# Patient Record
Sex: FEMALE | Race: WHITE | HISPANIC OR LATINO | Employment: OTHER | URBAN - METROPOLITAN AREA
[De-identification: names, ages, dates, MRNs, and addresses within clinical notes are randomized per-mention and may not be internally consistent; named-entity substitution may affect disease eponyms.]

---

## 2017-01-16 NOTE — TELEPHONE ENCOUNTER
Pt had family member call to request 3 month supply for voltaren gel.     Call back number: [place] [phone]

## 2017-03-10 ENCOUNTER — TELEPHONE (OUTPATIENT)
Dept: MEDICAL GROUP | Facility: MEDICAL CENTER | Age: 82
End: 2017-03-10

## 2017-03-10 NOTE — TELEPHONE ENCOUNTER
Patient's granddaughter called stating that patient has an appt for a colonoscopy on 3/20/17 and is asking if patient needs to worry about her blood sugars or have lab work done before the procedure?

## 2017-03-11 NOTE — TELEPHONE ENCOUNTER
I don't think so.  Her last CMP in nov showed glucose sl elevated at 109.  The colonoscopy place can always do a fingerstick glucose before the procedure if she is not feeling well.

## 2017-03-27 ENCOUNTER — OFFICE VISIT (OUTPATIENT)
Dept: MEDICAL GROUP | Facility: MEDICAL CENTER | Age: 82
End: 2017-03-27
Payer: MEDICARE

## 2017-03-27 VITALS
OXYGEN SATURATION: 94 % | WEIGHT: 147 LBS | HEART RATE: 70 BPM | SYSTOLIC BLOOD PRESSURE: 122 MMHG | TEMPERATURE: 98.5 F | BODY MASS INDEX: 28.86 KG/M2 | HEIGHT: 60 IN | DIASTOLIC BLOOD PRESSURE: 70 MMHG

## 2017-03-27 DIAGNOSIS — J40 BRONCHITIS: ICD-10-CM

## 2017-03-27 DIAGNOSIS — J02.9 ACUTE PHARYNGITIS, UNSPECIFIED ETIOLOGY: ICD-10-CM

## 2017-03-27 PROBLEM — J06.9 URI (UPPER RESPIRATORY INFECTION): Status: ACTIVE | Noted: 2017-03-27

## 2017-03-27 LAB
FLUAV+FLUBV AG SPEC QL IA: NORMAL
INT CON NEG: NEGATIVE
INT CON NEG: NEGATIVE
INT CON POS: POSITIVE
INT CON POS: POSITIVE
S PYO AG THROAT QL: NORMAL

## 2017-03-27 PROCEDURE — G8419 CALC BMI OUT NRM PARAM NOF/U: HCPCS | Performed by: FAMILY MEDICINE

## 2017-03-27 PROCEDURE — 87804 INFLUENZA ASSAY W/OPTIC: CPT | Performed by: FAMILY MEDICINE

## 2017-03-27 PROCEDURE — 87880 STREP A ASSAY W/OPTIC: CPT | Performed by: FAMILY MEDICINE

## 2017-03-27 PROCEDURE — G8484 FLU IMMUNIZE NO ADMIN: HCPCS | Performed by: FAMILY MEDICINE

## 2017-03-27 PROCEDURE — 1036F TOBACCO NON-USER: CPT | Performed by: FAMILY MEDICINE

## 2017-03-27 PROCEDURE — 4040F PNEUMOC VAC/ADMIN/RCVD: CPT | Performed by: FAMILY MEDICINE

## 2017-03-27 PROCEDURE — G8432 DEP SCR NOT DOC, RNG: HCPCS | Performed by: FAMILY MEDICINE

## 2017-03-27 PROCEDURE — 99213 OFFICE O/P EST LOW 20 MIN: CPT | Performed by: FAMILY MEDICINE

## 2017-03-27 PROCEDURE — 1101F PT FALLS ASSESS-DOCD LE1/YR: CPT | Performed by: FAMILY MEDICINE

## 2017-03-27 RX ORDER — CODEINE PHOSPHATE/GUAIFENESIN 10-100MG/5
5 LIQUID (ML) ORAL 3 TIMES DAILY PRN
Qty: 180 ML | Refills: 0 | Status: SHIPPED
Start: 2017-03-27 | End: 2017-04-06

## 2017-03-27 NOTE — PATIENT INSTRUCTIONS
Bronquitis  (Bronchitis)  La bronquitis es rell enfermedad de los conductos que transportan el aire a los pulmones. [place] dificulta la entrada y salida del aire. El estrechamiento de los conductos puede causar mucha tos. No recibir el tratamiento adecuado puede causar rell bronquitis de larga duración (crónica).  CUIDADOS EN EL HOGAR  · [place] mucha agua para mantener la orina [place] o de color [place] maddie.  · Use un humidificador de vapor frío.  · Si fuma, deje de fumar. Si sigue fumando, impedirá la curación de la bronquitis.  · Caliente los medicamentos según las indicaciones de gifofrd médico.  SOLICITE AYUDA DE INMEDIATO SI:  · La tos lo despierta por las noches.  · Respira con dificultad.  · Usted o gifford pollo se vuelve débil o más enfermo.  · Presenta dificultades para respirar, tiene sibilancias o le falta el aire.  · Tose y escupe jerardo.  · La tos dura más de 2 semanas.  · Tiene fiebre.  · Gifford bebé tiene más de 3 meses y gifford temperatura rectal es de 102° F (38.9° C) o más.  · Gifford bebé tiene 3 meses o menos y gifford temperatura rectal es de 100.4° F (38° C) o más.  ASEGURESE DE:  · Comprende estas instrucciones.  · Controlará gifford enfermedad.  · Solicitará ayuda de inmediato si no mejora o empeora.  Document Released: 03/16/2010 Document Revised: 03/11/2013  ExitCare® Patient Information ©2014 "[employer]".

## 2017-03-27 NOTE — ASSESSMENT & PLAN NOTE
Patient started Thursday 3/23 with fever and chills. She then developed a sore throat and cough. She does have some body aches. She did get flu shot in the fall. She lives with her daughter and son-in-law and her son-in-law has been ill with influenza. She denies any shortness of breath. She is hoarse and the cough does keep her up at night. She denies any headache.

## 2017-03-27 NOTE — MR AVS SNAPSHOT
Marii Green Perez   3/27/2017 3:00 PM   Office Visit   MRN: [id number]    Department:  [facility]   Dept Phone:  [phone]    Description:  Female : 1935   Provider:  Kitty Lantigua M.D.           Reason for Visit     Cough 3 days    Headache           Allergies as of 3/27/2017     Allergen Noted Reactions    Etodolac 2009   Anaphylaxis    dizziness    Lactose 2009   Diarrhea    intolerant      You were diagnosed with     Acute pharyngitis, unspecified etiology   [[id number]]       Bronchitis   [[id number]]         Vital Signs     Blood Pressure Pulse Temperature Height Weight Body Mass Index    122/70 mmHg 70 36.9 °C (98.5 °F) 1.524 m (5') 66.679 kg (147 lb) 28.71 kg/m2    Oxygen Saturation Breastfeeding? Smoking Status             94% No Never Smoker          Basic Information     Date Of Birth Sex Race Ethnicity Preferred Language    1935 Female White  Origin (Bolivian,Kyrgyz,Danish,Akil, etc) English      Problem List              ICD-10-CM Priority Class Noted - Resolved    Osteoporosis, senile M81.0 Low  12/3/2009 - Present    Preventative health care Z00.00   4/15/2010 - Present    Diverticulitis K57.92   Unknown - Present    Metabolic syndrome E88.81   Unknown - Present    Fatty liver K76.0   Unknown - Present    DDD (degenerative disc disease) [id number] Low  Unknown - Present    Allergic rhinitis    2011 - Present    Hyperlipidemia E78.5 Low  Unknown - Present    Lumbar stenosis M48.06 Low  Unknown - Present    History of pancreatitis Z87.19   Unknown - Present    RLQ abdominal pain R10.31   2012 - Present    Vitamin d deficiency    Unknown - Present    Abnormal LFTs R79.89   2014 - Present    Flatulence, eructation, and gas pain R14.3, R14.1, R14.2   2014 - Present    Fatigue R53.83   2015 - Present    Chest pain R07.9   2015 - Present    Shingles outbreak B02.9   9/3/2015 - Present    Peripheral neuropathy (CMS-HCC) G62.9    9/3/2015 - Present    URI (upper respiratory infection) J06.9   3/27/2017 - Present      Health Maintenance        Date Due Completion Dates    IMM ZOSTER VACCINE 6/1/1996 ---    IMM DTaP/Tdap/Td Vaccine (1 - Tdap) 2/19/2014 2/18/2014    IMM PNEUMOCOCCAL 65+ (ADULT) LOW/MEDIUM RISK SERIES (2 of 2 - PCV13) 7/16/2016 7/16/2015    IMM INFLUENZA (1) 9/1/2016 10/21/2015    MAMMOGRAM 6/22/2017 6/22/2016, 4/1/2015, 3/16/2015, 3/14/2014, 5/14/2012, 11/28/2011, 5/25/2011, 5/13/2011, 5/10/2010, 5/10/2010, 3/31/2009, 3/31/2009, 3/27/2008, 3/27/2008, 12/14/2006, 11/3/2005, 6/22/2004    BONE DENSITY 7/10/2019 7/10/2014, 7/9/2012, 11/25/2009    COLONOSCOPY 11/22/2020 11/22/2010 (Done)    Override on 11/22/2010: Done ([facility]IC)            Results     POCT Influenza A/B      Component    Rapid Influenza A-B    NEG    Internal Control Positive    Positive    Internal Control Negative    Negative                POCT Rapid Strep A      Component    Rapid Strep Screen    NEG    Internal Control Positive    Positive    Internal Control Negative    Negative                        Current Immunizations     Influenza Vaccine Adult HD 10/21/2015  3:53 PM    Pneumococcal polysaccharide vaccine (PPSV-23) 7/16/2015    TD Vaccine 2/18/2014      Below and/or attached are the medications your provider expects you to take. Review all of your home medications and newly ordered medications with your provider and/or pharmacist. Follow medication instructions as directed by your provider and/or pharmacist. Please keep your medication list with you and share with your provider. Update the information when medications are discontinued, doses are changed, or new medications (including over-the-counter products) are added; and carry medication information at all times in the event of emergency situations     Allergies:  ETODOLAC - Anaphylaxis     LACTOSE - Diarrhea               Medications  Valid as of: March 27, 2017 -  3:40 PM    Generic Name Brand Name  Tablet Size Instructions for use    Alendronate Sodium (Tab) FOSAMAX 70 MG Take 70 mg by mouth every 7 days. On Sun        Atorvastatin Calcium (Tab) LIPITOR 40 MG Take 40 mg by mouth every day.        Atorvastatin Calcium (Tab) LIPITOR 40 MG TAKE ONE TABLET BY MOUTH ONCE DAILY        Calcium   Take 1 Tab by mouth every day.        Cholecalciferol   Take 1 Tab by mouth every day.        Ciprofloxacin HCl (Tab) CIPRO 500 MG Take 500 mg by mouth 2 times a day. Pt started on 10/12/2016 for 10 day course for constipation, unspecified constipation type, Diarrhea of infectious origin, and lower abdominal pain of unknown etiology.        Diclofenac Sodium (Tablet Delayed Response) VOLTAREN 75 MG Take 1 Tab by mouth 2 times a day.        Diclofenac Sodium (Gel) Diclofenac Sodium 1 % Apply 1 Application to skin as directed 2 times a day. For pain in legs and hands.        Donepezil HCl (Tab) ARICEPT 5 MG Take 1 Tab by mouth every evening.        DULoxetine HCl (Cap DR Particles) CYMBALTA 60 MG Take 1 Cap by mouth every day.        Glucos-Chondroit-Hyaluron-MSM   Take 1 Tab by mouth every day.        Guaifenesin-Codeine (Syrup) TUSSI-ORGANIDIN -10 MG/5ML Take 5 mL by mouth 3 times a day as needed for Cough.        Hydrocodone-Acetaminophen (Tab) NORCO 5-325 MG Take 1 Tab by mouth every 8 hours as needed.        Levothyroxine Sodium (Tab) SYNTHROID 25 MCG Take 1 Tab by mouth Every morning on an empty stomach.        Montelukast Sodium (Tab) SINGULAIR 10 MG Take 1 Tab by mouth every day.        Olopatadine HCl (Solution) PATADAY 0.2 % INSTILL ONE DROP ONCE DAILY INTO EACH EYE        Omega-3 Fatty Acids (Cap) fish oil 1000 MG Take 2,000 mg by mouth every day.        Omeprazole (CAPSULE DELAYED RELEASE) PRILOSEC 40 MG Take 40 mg by mouth every day.        Oxybutynin Chloride (TABLET SR 24 HR) DITROPAN-XL 10 MG Take 10 mg by mouth every evening.        Polyethylene Glycol 3350 (Powder) MIRALAX  Take 17 g by mouth every day.          Pregabalin (Cap) LYRICA 100 MG Take 100 mg by mouth 2 times a day. Pt takes:  100MG in AM  200MG PM        Sucralfate (Tab) CARAFATE 1 GM Take 1 Tab by mouth 4 Times a Day,Before Meals and at Bedtime.        .                 Medicines prescribed today were sent to:     [facility] [address] [place] [address], [place] - [address]    [address] [place] [address]    Phone: [phone] Fax: [phone]    Open 24 Hours?: No      Medication refill instructions:       If your prescription bottle indicates you have medication refills left, it is not necessary to call your provider’s office. Please contact your pharmacy and they will refill your medication.    If your prescription bottle indicates you do not have any refills left, you may request refills at any time through one of the following ways: The online [email or web address] system (except Urgent Care), by calling your provider’s office, or by asking your pharmacy to contact your provider’s office with a refill request. Medication refills are processed only during regular business hours and may not be available until the next business day. Your provider may request additional information or to have a follow-up visit with you prior to refilling your medication.   *Please Note: Medication refills are assigned a new Rx number when refilled electronically. Your pharmacy may indicate that no refills were authorized even though a new prescription for the same medication is available at the pharmacy. Please request the medicine by name with the pharmacy before contacting your provider for a refill.        Instructions    Bronquitis  (Bronchitis)  La bronquitis es rell enfermedad de los conductos que transportan el aire a los pulmones. [place] dificulta la entrada y salida del aire. El estrechamiento de los conductos puede causar mucha tos. No recibir el tratamiento adecuado puede causar rell bronquitis de larga duración (crónica).  CUIDADOS EN EL HOGAR  · [place] mucha agua para mantener la orina  indiana o de color amarillo maddie.  · Use un humidificador de vapor frío.  · Si fuma, deje de fumar. Si sigue fumando, impedirá la curación de la bronquitis.  · [place] los medicamentos según las indicaciones de gifford médico.  SOLICITE AYUDA DE INMEDIATO SI:  · La tos lo despierta por las noches.  · Respira con dificultad.  · Usted o gifford pollo se vuelve débil o más enfermo.  · Presenta dificultades para respirar, tiene sibilancias o le falta el aire.  · Tose y escupe jerardo.  · La tos dura más de 2 semanas.  · Tiene fiebre.  · Gifford bebé tiene más de 3 meses y gifford temperatura rectal es de 102° F (38.9° C) o más.  · Gifford bebé tiene 3 meses o menos y gifford temperatura rectal es de 100.4° F (38° C) o más.  ASEGURESE DE:  · Comprende estas instrucciones.  · Controlará gifford enfermedad.  · Solicitará ayuda de inmediato si no mejora o empeora.  Document Released: 03/16/2010 Document Revised: 03/11/2013  ExitCare® Patient Information ©2014 [employer].         Other Notes About Your Plan     Enrolled in R-Med Team with Neha Jacques Access Code: [email or web address]  Expires: 4/26/2017  3:40 PM    Acteavo  A secure, online tool to manage your health information     [employer]’s Acteavo® is a secure, online tool that connects you to your personalized health information from the privacy of your home -- day or night - making it very easy for you to manage your healthcare. Once the activation process is completed, you can even access your medical information using the Acteavo sadia, which is available for free in the Apple Sadia store or Google Play store.     Acteavo provides the following levels of access (as shown below):   My Chart Features   [facility] Doctor [facility]  Specialists [facility]  Urgent  Care Non-[facility]  Primary Care  Doctor   Email your healthcare team securely and privately 24/7 X X X    Manage appointments: schedule your next appointment; view details of past/upcoming appointments X      Request prescription  refills. X      View recent personal medical records, including lab and immunizations X X X X   View health record, including health history, allergies, medications X X X X   Read reports about your outpatient visits, procedures, consult and ER notes X X X X   See your discharge summary, which is a recap of your hospital and/or ER visit that includes your diagnosis, lab results, and care plan. X X       How to register for [employer]:  1. Go to  [email or web address].  2. Click on the Sign Up Now box, which takes you to the New Member Sign Up page. You will need to provide the following information:  a. Enter your [employer] Access Code exactly as it appears at the top of this page. (You will not need to use this code after you’ve completed the sign-up process. If you do not sign up before the expiration date, you must request a new code.)   b. Enter your date of birth.   c. Enter your home email address.   d. Click Submit, and follow the next screen’s instructions.  3. Create a [employer] ID. This will be your [employer] login ID and cannot be changed, so think of one that is secure and easy to remember.  4. Create a [employer] password. You can change your password at any time.  5. Enter your Password Reset Question and Answer. This can be used at a later time if you forget your password.   6. Enter your e-mail address. This allows you to receive e-mail notifications when new information is available in [employer].  7. Click Sign Up. You can now view your health information.    For assistance activating your [employer] account, call [phone]

## 2017-03-27 NOTE — PROGRESS NOTES
Subjective:     Chief Complaint   Patient presents with   • Cough     3 days   • Headache     Marii is a regular patient of Neha Ball.  Marii Neal is a 81 y.o. female here today for evaluation and management of:    URI (upper respiratory infection)  Patient started Thursday 3/23 with fever and chills. She then developed a sore throat and cough. She does have some body aches. She did get flu shot in the fall. She lives with her daughter and son-in-law and her son-in-law has been ill with influenza. She denies any shortness of breath. She is hoarse and the cough does keep her up at night. She denies any headache.       Allergies   Allergen Reactions   • Etodolac Anaphylaxis     dizziness   • Lactose Diarrhea     intolerant       Current medicines (including changes today)  Current Outpatient Prescriptions   Medication Sig Dispense Refill   • guaifenesin-codeine (TUSSI-ORGANIDIN NR) 100-10 MG/5ML syrup Take 5 mL by mouth 3 times a day as needed for Cough. 180 mL 0   • PATADAY 0.2 % Solution INSTILL ONE DROP ONCE DAILY INTO EACH EYE 3 Bottle 0   • sucralfate (CARAFATE) 1 GM Tab Take 1 Tab by mouth 4 Times a Day,Before Meals and at Bedtime. 120 Tab 0   • duloxetine (CYMBALTA) 60 MG Cap DR Particles delayed-release capsule Take 1 Cap by mouth every day. 90 Cap 0   • polyethylene glycol 3350 (MIRALAX) Powder Take 17 g by mouth every day. 1581 g 0   • atorvastatin (LIPITOR) 40 MG Tab TAKE ONE TABLET BY MOUTH ONCE DAILY 90 Tab 3   • diclofenac EC (VOLTAREN) 75 MG Tablet Delayed Response Take 1 Tab by mouth 2 times a day. 30 Tab 0   • alendronate (FOSAMAX) 70 MG Tab Take 70 mg by mouth every 7 days. On Sun     • atorvastatin (LIPITOR) 40 MG Tab Take 40 mg by mouth every day.     • ciprofloxacin (CIPRO) 500 MG Tab Take 500 mg by mouth 2 times a day. Pt started on 10/12/2016 for 10 day course for constipation, unspecified constipation type, Diarrhea of infectious origin, and lower abdominal pain of  unknown etiology.     • omeprazole (PRILOSEC) 40 MG delayed-release capsule Take 40 mg by mouth every day.     • oxybutynin SR (DITROPAN-XL) 10 MG CR tablet Take 10 mg by mouth every evening.     • Cholecalciferol (VITAMIN D PO) Take 1 Tab by mouth every day.     • Omega-3 Fatty Acids (FISH OIL) 1000 MG Cap capsule Take 2,000 mg by mouth every day.     • CALCIUM PO Take 1 Tab by mouth every day.     • Glucos-Chondroit-Hyaluron-MSM (GLUCOSAMINE CHONDROITIN JOINT PO) Take 1 Tab by mouth every day.     • levothyroxine (SYNTHROID) 25 MCG Tab Take 1 Tab by mouth Every morning on an empty stomach. 90 Tab 3   • donepezil (ARICEPT) 5 MG Tab Take 1 Tab by mouth every evening. 90 Tab 3   • pregabalin (LYRICA) 100 MG Cap Take 100 mg by mouth 2 times a day. Pt takes:  100MG in AM  200MG PM     • montelukast (SINGULAIR) 10 MG Tab Take 1 Tab by mouth every day. 30 Tab 30   • Diclofenac Sodium (VOLTAREN) 1 % Gel Apply 1 Application to skin as directed 2 times a day. For pain in legs and hands. 1 Tube 3   • hydrocodone-acetaminophen (NORCO) 5-325 MG Tab per tablet Take 1 Tab by mouth every 8 hours as needed. 30 Tab 0     No current facility-administered medications for this visit.       She  has a past medical history of Arthritis; Diverticulitis; Pancreatitis (2002); Lumbar stenosis; Hyperlipidemia LDL goal < 100; Osteoporosis; ASCUS on Pap smear (5/8/09); H. pylori infection (3/17/08); Metabolic syndrome; Fatty liver; Normal cardiac stress test (1/8/10); DDD (degenerative disc disease); Allergic rhinitis; Vitamin d deficiency; and Peripheral neuropathy (CMS-HCC). She also has no past medical history of Breast cancer (CMS-HCC).    Patient Active Problem List    Diagnosis Date Noted   • Hyperlipidemia      Priority: Low   • Lumbar stenosis      Priority: Low   • DDD (degenerative disc disease)      Priority: Low   • Osteoporosis, senile 12/03/2009     Priority: Low   • URI (upper respiratory infection) 03/27/2017   • Shingles  outbreak 09/03/2015   • Peripheral neuropathy (CMS-[facility]) 09/03/2015   • Chest pain 05/29/2015   • Fatigue 02/24/2015   • Flatulence, eructation, and gas pain 01/29/2014   • Abnormal LFTs 01/08/2014   • Vitamin d deficiency    • RLQ abdominal pain 06/07/2012   • History of pancreatitis    • Allergic rhinitis 04/14/2011   • Metabolic syndrome    • Fatty liver    • Diverticulitis    • Preventative health care 04/15/2010       ROS    No nausea or vomiting.  No chest pain or palpitations.  No  SOB.  No pain with urination or hematuria.  No black or bloody stools.       Objective:     Blood pressure 122/70, pulse 70, temperature 36.9 °C (98.5 °F), height 1.524 m (5'), weight 66.679 kg (147 lb), SpO2 94 %, not currently breastfeeding. Body mass index is 28.71 kg/(m^2).   Physical Exam:  Well developed, well nourished.  Alert, oriented in no acute distress.  Eye contact is good, speech goal directed, affect calm  Eyes: conjunctiva non-injected, sclera non-icteric.  Ears: Pinna normal. TM pearly gray.   Nose: Nares are patent.  Erythematous swollen mucosa with clear discharge  Mouth: Oral mucous membranes pink and moist with no lesions. Posterior pharynx with moderate diffuse erythema without exudates  Neck Supple.  No adenopathy or masses in the neck or supraclavicular regions. No thyromegaly  Lungs: clear to auscultation bilaterally with good excursion. No wheezes or rhonchi  CV: regular rate and rhythm. No murmur      Assessment and Plan:   The following treatment plan was discussed    1. Acute pharyngitis, unspecified etiology  Viral symptomatic care. Call if not improving  - POCT Influenza A/B  - POCT Rapid Strep A    2. Bronchitis  Cough syrup as needed. Increase fluids and rest. Call if not improving. Patient education material given.  - guaifenesin-codeine (TUSSI-ORGANIDIN NR) 100-10 MG/5ML syrup; Take 5 mL by mouth 3 times a day as needed for Cough.  Dispense: 180 mL; Refill: 0    Any change or worsening of signs or  symptoms, patient encouraged to follow-up or report to the emergency room for further evaluation. Patient understands and agrees.    Followup: Return if symptoms worsen or fail to improve.

## 2017-04-06 ENCOUNTER — OFFICE VISIT (OUTPATIENT)
Dept: MEDICAL GROUP | Facility: MEDICAL CENTER | Age: 82
End: 2017-04-06
Payer: MEDICARE

## 2017-04-06 VITALS
DIASTOLIC BLOOD PRESSURE: 62 MMHG | WEIGHT: 148 LBS | HEART RATE: 125 BPM | BODY MASS INDEX: 29.06 KG/M2 | SYSTOLIC BLOOD PRESSURE: 110 MMHG | OXYGEN SATURATION: 93 % | TEMPERATURE: 98.4 F | HEIGHT: 60 IN

## 2017-04-06 DIAGNOSIS — R41.3 MEMORY LOSS: ICD-10-CM

## 2017-04-06 DIAGNOSIS — R51.9 RECURRENT HEADACHE: ICD-10-CM

## 2017-04-06 DIAGNOSIS — R45.4 IRRITABILITY: ICD-10-CM

## 2017-04-06 PROCEDURE — G8419 CALC BMI OUT NRM PARAM NOF/U: HCPCS | Performed by: NURSE PRACTITIONER

## 2017-04-06 PROCEDURE — 1036F TOBACCO NON-USER: CPT | Performed by: NURSE PRACTITIONER

## 2017-04-06 PROCEDURE — 4040F PNEUMOC VAC/ADMIN/RCVD: CPT | Performed by: NURSE PRACTITIONER

## 2017-04-06 PROCEDURE — 99214 OFFICE O/P EST MOD 30 MIN: CPT | Performed by: NURSE PRACTITIONER

## 2017-04-06 PROCEDURE — G8432 DEP SCR NOT DOC, RNG: HCPCS | Performed by: NURSE PRACTITIONER

## 2017-04-06 PROCEDURE — 1101F PT FALLS ASSESS-DOCD LE1/YR: CPT | Performed by: NURSE PRACTITIONER

## 2017-04-06 NOTE — MR AVS SNAPSHOT
Marii Jolly Peter Kirkpatrick   2017 11:00 AM   Office Visit   MRN: [id number]    Department:  [facility]   Dept Phone:  [phone]    Description:  Female : 1935   Provider:  MUKUL Raymond           Allergies as of 2017     Allergen Noted Reactions    Etodolac 2009   Anaphylaxis    dizziness    Lactose 2009   Diarrhea    intolerant      You were diagnosed with     Memory loss   [780.93.ICD-9-CM]   refer neuro for eval.  MRI brain w/o.  dc oxybutynin and omeprazole    Irritability   [799.22.ICD-9-CM]       Recurrent headache   [736439]         Vital Signs     Blood Pressure Pulse Temperature Height Weight Body Mass Index    110/62 mmHg 125 36.9 °C (98.4 °F) 1.524 m (5') 67.132 kg (148 lb) 28.90 kg/m2    Oxygen Saturation Breastfeeding? Smoking Status             93% No Never Smoker          Basic Information     Date Of Birth Sex Race Ethnicity Preferred Language    1935 Female White  Origin (Urdu,Togolese,Senegalese,Maltese, etc) English      Problem List              ICD-10-CM Priority Class Noted - Resolved    Osteoporosis, senile M81.0 Low  12/3/2009 - Present    Preventative health care Z00.00   4/15/2010 - Present    Diverticulitis K57.92   Unknown - Present    Metabolic syndrome E88.81   Unknown - Present    Fatty liver K76.0   Unknown - Present    DDD (degenerative disc disease) [id number] Low  Unknown - Present    Allergic rhinitis    2011 - Present    Hyperlipidemia E78.5 Low  Unknown - Present    Lumbar stenosis M48.06 Low  Unknown - Present    History of pancreatitis Z87.19   Unknown - Present    RLQ abdominal pain R10.31   2012 - Present    Vitamin d deficiency    Unknown - Present    Abnormal LFTs R79.89   2014 - Present    Flatulence, eructation, and gas pain R14.3, R14.1, R14.2   2014 - Present    Fatigue R53.83   2015 - Present    Chest pain R07.9   2015 - Present    Shingles outbreak B02.9   9/3/2015 - Present      Peripheral neuropathy (CMS-[facility]) G62.9   9/3/2015 - Present    URI (upper respiratory infection) J06.9   3/27/2017 - Present      Health Maintenance        Date Due Completion Dates    IMM ZOSTER VACCINE 6/1/1996 ---    IMM DTaP/Tdap/Td Vaccine (1 - Tdap) 2/19/2014 2/18/2014    IMM PNEUMOCOCCAL 65+ (ADULT) LOW/MEDIUM RISK SERIES (2 of 2 - PCV13) 7/16/2016 7/16/2015    MAMMOGRAM 6/22/2017 6/22/2016, 4/1/2015, 3/16/2015, 3/14/2014, 5/14/2012, 11/28/2011, 5/25/2011, 5/13/2011, 5/10/2010, 5/10/2010, 3/31/2009, 3/31/2009, 3/27/2008, 3/27/2008, 12/14/2006, 11/3/2005, 6/22/2004    BONE DENSITY 7/10/2019 7/10/2014, 7/9/2012, 11/25/2009    COLONOSCOPY 11/22/2020 11/22/2010 (Done)    Override on 11/22/2010: Done ([facility])            Current Immunizations     Influenza Vaccine Adult HD 10/21/2015  3:53 PM    Pneumococcal polysaccharide vaccine (PPSV-23) 7/16/2015    TD Vaccine 2/18/2014      Below and/or attached are the medications your provider expects you to take. Review all of your home medications and newly ordered medications with your provider and/or pharmacist. Follow medication instructions as directed by your provider and/or pharmacist. Please keep your medication list with you and share with your provider. Update the information when medications are discontinued, doses are changed, or new medications (including over-the-counter products) are added; and carry medication information at all times in the event of emergency situations     Allergies:  ETODOLAC - Anaphylaxis     LACTOSE - Diarrhea               Medications  Valid as of: April 06, 2017 - 11:28 AM    Generic Name Brand Name Tablet Size Instructions for use    Alendronate Sodium (Tab) FOSAMAX 70 MG Take 70 mg by mouth every 7 days. On Sun        Atorvastatin Calcium (Tab) LIPITOR 40 MG Take 40 mg by mouth every day.        Calcium   Take 1 Tab by mouth every day.        Cholecalciferol   Take 1 Tab by mouth every day.        Diclofenac Sodium (Gel) Diclofenac  Sodium 1 % Apply 1 Application to skin as directed 2 times a day. For pain in legs and hands.        Donepezil HCl (Tab) ARICEPT 5 MG Take 1 Tab by mouth every evening.        DULoxetine HCl (Cap DR Particles) CYMBALTA 60 MG Take 1 Cap by mouth every day.        Glucos-Chondroit-Hyaluron-MSM   Take 1 Tab by mouth every day.        Levothyroxine Sodium (Tab) SYNTHROID 25 MCG Take 1 Tab by mouth Every morning on an empty stomach.        Montelukast Sodium (Tab) SINGULAIR 10 MG Take 1 Tab by mouth every day.        Olopatadine HCl (Solution) PATADAY 0.2 % INSTILL ONE DROP ONCE DAILY INTO EACH EYE        Omega-3 Fatty Acids (Cap) fish oil 1000 MG Take 2,000 mg by mouth every day.        Polyethylene Glycol 3350 (Powder) MIRALAX  Take 17 g by mouth every day.        Pregabalin (Cap) LYRICA 100 MG Take 100 mg by mouth 2 times a day. Pt takes:  100MG in AM  200MG PM        .                 Medicines prescribed today were sent to:     [facility] [address] [place], [place] [address] [address] [place], [place] [address] [address] [place] [id number]    Phone: [phone] Fax: [phone]    Open 24 Hours?: No      Medication refill instructions:       If your prescription bottle indicates you have medication refills left, it is not necessary to call your provider’s office. Please contact your pharmacy and they will refill your medication.    If your prescription bottle indicates you do not have any refills left, you may request refills at any time through one of the following ways: The online [employer] system (except Urgent Care), by calling your provider’s office, or by asking your pharmacy to contact your provider’s office with a refill request. Medication refills are processed only during regular business hours and may not be available until the next business day. Your provider may request additional information or to have a follow-up visit with you prior to refilling your medication.   *Please Note: Medication refills are assigned a new Rx number when  refilled electronically. Your pharmacy may indicate that no refills were authorized even though a new prescription for the same medication is available at the pharmacy. Please request the medicine by name with the pharmacy before contacting your provider for a refill.        Your To Do List     Future Labs/Procedures Complete By Expires    MR-BRAIN-W/O  As directed 10/7/2017    Scheduling Instructions:    Memory loss      Referral     A referral request has been sent to our patient care coordination department. Please allow 3-5 business days for us to process this request and contact you either by phone or mail. If you do not hear from us by the 5th business day, please call us at [phone].        Other Notes About Your Plan     Enrolled in R-Med Team with Neha Jacques Status: Patient Declined

## 2017-04-06 NOTE — PROGRESS NOTES
Subjective:      Marii Neal is a 81 y.o. female who presents with No chief complaint on file.            HPI  Seen in f/u for memory loss.  Its getting worse.  She is angry all the time.  That is getting worse.  She is on omeprazoe and oxybutynin that can effect memory.  They would like to see neuro.   Daughter is her primary caregiver and its getting much harder to care for pt d/t the anger.    She has freq headache.    b12 and folate done and were wnl.  seh is on aricept w/o improvment.   Taking meds approp.  They are given to her by her family.    Having some allergy sx.  She is on singulair, zyrtec and flonase.  Did MMSE on pt via daughter as .  She could not do most of requests.        Patient Active Problem List    Diagnosis Date Noted   • Hyperlipidemia      Priority: Low   • Lumbar stenosis      Priority: Low   • DDD (degenerative disc disease)      Priority: Low   • Osteoporosis, senile 12/03/2009     Priority: Low   • URI (upper respiratory infection) 03/27/2017   • Shingles outbreak 09/03/2015   • Peripheral neuropathy (CMS-HCC) 09/03/2015   • Chest pain 05/29/2015   • Fatigue 02/24/2015   • Flatulence, eructation, and gas pain 01/29/2014   • Abnormal LFTs 01/08/2014   • Vitamin d deficiency    • RLQ abdominal pain 06/07/2012   • History of pancreatitis    • Allergic rhinitis 04/14/2011   • Metabolic syndrome    • Fatty liver    • Diverticulitis    • Preventative health care 04/15/2010     Current Outpatient Prescriptions   Medication Sig Dispense Refill   • Diclofenac Sodium (VOLTAREN) 1 % Gel Apply 1 Application to skin as directed 2 times a day. For pain in legs and hands. 1 Tube 3   • PATADAY 0.2 % Solution INSTILL ONE DROP ONCE DAILY INTO EACH EYE 3 Bottle 0   • duloxetine (CYMBALTA) 60 MG Cap DR Particles delayed-release capsule Take 1 Cap by mouth every day. 90 Cap 0   • polyethylene glycol 3350 (MIRALAX) Powder Take 17 g by mouth every day. 1581 g 0   • alendronate  (FOSAMAX) 70 MG Tab Take 70 mg by mouth every 7 days. On Sun     • atorvastatin (LIPITOR) 40 MG Tab Take 40 mg by mouth every day.     • Cholecalciferol (VITAMIN D PO) Take 1 Tab by mouth every day.     • Omega-3 Fatty Acids (FISH OIL) 1000 MG Cap capsule Take 2,000 mg by mouth every day.     • CALCIUM PO Take 1 Tab by mouth every day.     • Glucos-Chondroit-Hyaluron-MSM (GLUCOSAMINE CHONDROITIN JOINT PO) Take 1 Tab by mouth every day.     • levothyroxine (SYNTHROID) 25 MCG Tab Take 1 Tab by mouth Every morning on an empty stomach. 90 Tab 3   • donepezil (ARICEPT) 5 MG Tab Take 1 Tab by mouth every evening. 90 Tab 3   • pregabalin (LYRICA) 100 MG Cap Take 100 mg by mouth 2 times a day. Pt takes:  100MG in AM  200MG PM     • montelukast (SINGULAIR) 10 MG Tab Take 1 Tab by mouth every day. 30 Tab 30     No current facility-administered medications for this visit.     Allergies   Allergen Reactions   • Etodolac Anaphylaxis     dizziness   • Lactose Diarrhea     intolerant       ROS  Review of Systems   Constitutional: Negative.  Negative for fever, chills, weight loss, malaise/fatigue and diaphoresis.   HENT: Negative.  Negative for hearing loss, ear pain, nosebleeds, congestion, sore throat, neck pain, tinnitus and ear discharge.    Respiratory: Negative.  Negative for cough, hemoptysis, sputum production, shortness of breath, wheezing and stridor.    Cardiovascular: Negative.  Negative for chest pain, palpitations, orthopnea, claudication, leg swelling and PND.   Gastrointestinal: denies nausea, vomiting, diarrhea, constipation, heartburn, melena or hematochezia.  Genitourinary: Denies dysuria, hematuria, urinary incontinence, frequency or urgency.    Musculoskeletal: Negative.  Negative for myalgias and back pain.   Neurological: Negative.  Negative for dizziness, tingling, tremors, weakness and headaches.   Psych:  Denies depression, anxiety or insomnia.  All other systems reviewed and are negative.            Objective:     /62 mmHg  Pulse 125  Temp(Src) 36.9 °C (98.4 °F)  Ht 1.524 m (5')  Wt 67.132 kg (148 lb)  BMI 28.90 kg/m2  SpO2 93%  Breastfeeding? No     Physical Exam   Eyes: EOM are normal. Pupils are equal, round, and reactive to light. Right eye exhibits no discharge. Left eye exhibits no discharge. No scleral icterus.   Neurological: She is alert. No cranial nerve deficit. Coordination abnormal.         Physical Exam   Vitals reviewed.  Constitutional: appears well-developed and well-nourished. No distress.   Cardiovascular: Normal rate, regular rhythm, normal heart sounds and intact distal pulses.  Exam reveals no gallop and no friction rub.  No murmur heard.  No carotid bruits.  Obeys simple commands.   Pulmonary/Chest: Effort normal and breath sounds normal. No stridor. No respiratory distress. no wheezes or rales. exhibits no tenderness.   Musculoskeletal: Normal range of motion. exhibits no edema. bryn pedal pulses 2+.  Neurological: disoriented to person, place and time.   Skin: Skin is warm and dry. no diaphoresis.   Psychiatric: normal mood and affect. behavior is normal.   amb slowly with walker.    MMSE done via .  11.5/30         Assessment/Plan:     1. Memory loss  REFERRAL TO NEUROLOGY    MR-BRAIN-W/O    refer neuro for eval.  MRI brain w/o.  dc oxybutynin and omeprazole   2. Irritability  REFERRAL TO NEUROLOGY    MR-BRAIN-W/O   3. Recurrent headache  REFERRAL TO NEUROLOGY    MR-BRAIN-W/O     4.  F/u after neuro eval.

## 2017-04-12 NOTE — TELEPHONE ENCOUNTER
Was the patient seen in the last year in this department? Yes     Does patient have an active prescription for medications requested? No     Received Request Via: Pharmacy

## 2017-04-18 ENCOUNTER — OFFICE VISIT (OUTPATIENT)
Dept: URGENT CARE | Facility: PHYSICIAN GROUP | Age: 82
End: 2017-04-18
Payer: MEDICARE

## 2017-04-18 ENCOUNTER — HOSPITAL ENCOUNTER (OUTPATIENT)
Dept: LAB | Facility: MEDICAL CENTER | Age: 82
End: 2017-04-18
Attending: PHYSICIAN ASSISTANT
Payer: MEDICARE

## 2017-04-18 ENCOUNTER — TELEPHONE (OUTPATIENT)
Dept: URGENT CARE | Facility: PHYSICIAN GROUP | Age: 82
End: 2017-04-18

## 2017-04-18 VITALS
HEART RATE: 86 BPM | TEMPERATURE: 97.9 F | RESPIRATION RATE: 16 BRPM | HEIGHT: 60 IN | OXYGEN SATURATION: 98 % | DIASTOLIC BLOOD PRESSURE: 64 MMHG | WEIGHT: 148 LBS | SYSTOLIC BLOOD PRESSURE: 112 MMHG | BODY MASS INDEX: 29.06 KG/M2

## 2017-04-18 DIAGNOSIS — K57.32 DIVERTICULITIS OF LARGE INTESTINE WITHOUT PERFORATION OR ABSCESS WITHOUT BLEEDING: Primary | ICD-10-CM

## 2017-04-18 DIAGNOSIS — M25.552 LEFT HIP PAIN: ICD-10-CM

## 2017-04-18 DIAGNOSIS — K57.32 DIVERTICULITIS OF COLON: ICD-10-CM

## 2017-04-18 DIAGNOSIS — R10.32 LEFT LOWER QUADRANT PAIN: ICD-10-CM

## 2017-04-18 DIAGNOSIS — K57.92 DIVERTICULITIS OF INTESTINE WITHOUT PERFORATION OR ABSCESS WITHOUT BLEEDING, UNSPECIFIED PART OF INTESTINAL TRACT: ICD-10-CM

## 2017-04-18 LAB
ALBUMIN SERPL BCP-MCNC: 4 G/DL (ref 3.2–4.9)
ALBUMIN/GLOB SERPL: 1.3 G/DL
ALP SERPL-CCNC: 75 U/L (ref 30–99)
ALT SERPL-CCNC: 15 U/L (ref 2–50)
ANION GAP SERPL CALC-SCNC: 8 MMOL/L (ref 0–11.9)
APPEARANCE UR: CLEAR
AST SERPL-CCNC: 20 U/L (ref 12–45)
BASOPHILS # BLD AUTO: 1.2 % (ref 0–1.8)
BASOPHILS # BLD: 0.12 K/UL (ref 0–0.12)
BILIRUB SERPL-MCNC: 0.4 MG/DL (ref 0.1–1.5)
BILIRUB UR STRIP-MCNC: NORMAL MG/DL
BUN SERPL-MCNC: 18 MG/DL (ref 8–22)
CALCIUM SERPL-MCNC: 9.8 MG/DL (ref 8.5–10.5)
CHLORIDE SERPL-SCNC: 104 MMOL/L (ref 96–112)
CO2 SERPL-SCNC: 28 MMOL/L (ref 20–33)
COLOR UR AUTO: YELLOW
CREAT SERPL-MCNC: 0.68 MG/DL (ref 0.5–1.4)
CRP SERPL HS-MCNC: 9.1 MG/L (ref 0–7.5)
EOSINOPHIL # BLD AUTO: 0.26 K/UL (ref 0–0.51)
EOSINOPHIL NFR BLD: 2.7 % (ref 0–6.9)
ERYTHROCYTE [DISTWIDTH] IN BLOOD BY AUTOMATED COUNT: 47.8 FL (ref 35.9–50)
GFR SERPL CREATININE-BSD FRML MDRD: >60 ML/MIN/1.73 M 2
GLOBULIN SER CALC-MCNC: 3.2 G/DL (ref 1.9–3.5)
GLUCOSE SERPL-MCNC: 106 MG/DL (ref 65–99)
GLUCOSE UR STRIP.AUTO-MCNC: NORMAL MG/DL
HCT VFR BLD AUTO: 41.1 % (ref 37–47)
HGB BLD-MCNC: 13.3 G/DL (ref 12–16)
IMM GRANULOCYTES # BLD AUTO: 0.03 K/UL (ref 0–0.11)
IMM GRANULOCYTES NFR BLD AUTO: 0.3 % (ref 0–0.9)
KETONES UR STRIP.AUTO-MCNC: NORMAL MG/DL
LEUKOCYTE ESTERASE UR QL STRIP.AUTO: NORMAL
LYMPHOCYTES # BLD AUTO: 2.48 K/UL (ref 1–4.8)
LYMPHOCYTES NFR BLD: 25.7 % (ref 22–41)
MCH RBC QN AUTO: 29.4 PG (ref 27–33)
MCHC RBC AUTO-ENTMCNC: 32.4 G/DL (ref 33.6–35)
MCV RBC AUTO: 90.9 FL (ref 81.4–97.8)
MONOCYTES # BLD AUTO: 0.65 K/UL (ref 0–0.85)
MONOCYTES NFR BLD AUTO: 6.7 % (ref 0–13.4)
NEUTROPHILS # BLD AUTO: 6.11 K/UL (ref 2–7.15)
NEUTROPHILS NFR BLD: 63.4 % (ref 44–72)
NITRITE UR QL STRIP.AUTO: NORMAL
NRBC # BLD AUTO: 0 K/UL
NRBC BLD AUTO-RTO: 0 /100 WBC
PH UR STRIP.AUTO: 6 [PH] (ref 5–8)
PLATELET # BLD AUTO: 283 K/UL (ref 164–446)
PMV BLD AUTO: 11.5 FL (ref 9–12.9)
POTASSIUM SERPL-SCNC: 4.5 MMOL/L (ref 3.6–5.5)
PROT SERPL-MCNC: 7.2 G/DL (ref 6–8.2)
PROT UR QL STRIP: NORMAL MG/DL
RBC # BLD AUTO: 4.52 M/UL (ref 4.2–5.4)
RBC UR QL AUTO: NORMAL
SODIUM SERPL-SCNC: 140 MMOL/L (ref 135–145)
SP GR UR STRIP.AUTO: 1.02
UROBILINOGEN UR STRIP-MCNC: NORMAL MG/DL
WBC # BLD AUTO: 9.7 K/UL (ref 4.8–10.8)

## 2017-04-18 PROCEDURE — 4040F PNEUMOC VAC/ADMIN/RCVD: CPT | Performed by: PHYSICIAN ASSISTANT

## 2017-04-18 PROCEDURE — 81002 URINALYSIS NONAUTO W/O SCOPE: CPT | Performed by: PHYSICIAN ASSISTANT

## 2017-04-18 PROCEDURE — 1036F TOBACCO NON-USER: CPT | Performed by: PHYSICIAN ASSISTANT

## 2017-04-18 PROCEDURE — 99214 OFFICE O/P EST MOD 30 MIN: CPT | Performed by: PHYSICIAN ASSISTANT

## 2017-04-18 PROCEDURE — 86141 C-REACTIVE PROTEIN HS: CPT | Mod: GA

## 2017-04-18 PROCEDURE — 85025 COMPLETE CBC W/AUTO DIFF WBC: CPT

## 2017-04-18 PROCEDURE — 36415 COLL VENOUS BLD VENIPUNCTURE: CPT

## 2017-04-18 PROCEDURE — 80053 COMPREHEN METABOLIC PANEL: CPT

## 2017-04-18 PROCEDURE — G8432 DEP SCR NOT DOC, RNG: HCPCS | Performed by: PHYSICIAN ASSISTANT

## 2017-04-18 PROCEDURE — G8419 CALC BMI OUT NRM PARAM NOF/U: HCPCS | Performed by: PHYSICIAN ASSISTANT

## 2017-04-18 PROCEDURE — 1101F PT FALLS ASSESS-DOCD LE1/YR: CPT | Performed by: PHYSICIAN ASSISTANT

## 2017-04-18 RX ORDER — CIPROFLOXACIN 250 MG/1
250 TABLET, FILM COATED ORAL 2 TIMES DAILY
Qty: 20 TAB | Refills: 0 | Status: SHIPPED | OUTPATIENT
Start: 2017-04-18 | End: 2017-04-19

## 2017-04-18 RX ORDER — METRONIDAZOLE 500 MG/1
500 TABLET ORAL 3 TIMES DAILY
Qty: 30 TAB | Refills: 0 | Status: SHIPPED | OUTPATIENT
Start: 2017-04-18 | End: 2017-04-19

## 2017-04-18 ASSESSMENT — ENCOUNTER SYMPTOMS
EYES NEGATIVE: 1
NEUROLOGICAL NEGATIVE: 1
ABDOMINAL PAIN: 1
BACK PAIN: 1
RESPIRATORY NEGATIVE: 1
FALLS: 0
MYALGIAS: 0
HIP PAIN: 1
DIARRHEA: 1
CARDIOVASCULAR NEGATIVE: 1
PSYCHIATRIC NEGATIVE: 1

## 2017-04-18 NOTE — PROGRESS NOTES
Subjective:      Marii Neal is a 81 y.o. female who presents with Hip Pain            Hip Pain  Associated symptoms include abdominal pain. Pertinent negatives include no myalgias.     Chief Complaint   Patient presents with   • Hip Pain     Left sided hip pain x 5 days. No known injury.       HPI:  Marii Neal is a 81 y.o. female who presents with left hip pain x 5 days.  Presents with daughter.  This pain is different from usual arthritis pain.  Pain is in front a radiates to the back.  Colonoscopy last month.  Switches between diarrhea and constipation usually.  Last 5 days not sure if having more diarrhea.  No blood in the stool.    Some diarrhea this am. No nausea or vomiting.    Patient denies HA, SOB, chest pain, palpitations, fever, chills, or n/v/d.      Past Medical History   Diagnosis Date   • Arthritis      hand pain and numbness   • Diverticulitis    • Pancreatitis 2002   • Lumbar stenosis      MRI lumbar spine 2011 showed multilevel OA and multiple bulging with stenosis   • Hyperlipidemia LDL goal < 100    • Osteoporosis    • ASCUS on Pap smear 5/8/09   • H. pylori infection 3/17/08     treated with prevpac   • Metabolic syndrome    • Fatty liver    • Normal cardiac stress test 1/8/10   • DDD (degenerative disc disease)    • Allergic rhinitis    • Vitamin d deficiency    • Peripheral neuropathy (CMS-HCC)        Past Surgical History   Procedure Laterality Date   • Cholecystectomy     • Lumbar disc replacement         Family History   Problem Relation Age of Onset   • Family history unknown: Yes       Social History     Social History   • Marital Status:      Spouse Name: N/A   • Number of Children: N/A   • Years of Education: N/A     Occupational History   • Not on file.     Social History Main Topics   • Smoking status: Never Smoker    • Smokeless tobacco: Never Used   • Alcohol Use: No      Comment: rare   • Drug Use: No   • Sexual Activity: Not on file     Other  Topics Concern   • Not on file     Social History Narrative         Current outpatient prescriptions:   •  LYRICA, TAKE ONE CAPSULE BY MOUTH THREE TIMES DAILY, 4/18/2017  •  Diclofenac Sodium, 1 Application, Transdermal, BID, 4/18/2017  •  PATADAY, INSTILL ONE DROP ONCE DAILY INTO EACH EYE, 4/18/2017  •  duloxetine, 60 mg, Oral, DAILY, 4/18/2017  •  polyethylene glycol 3350, 17 g, Oral, DAILY, 4/18/2017  •  alendronate, 70 mg, Oral, Q7 DAYS, 4/18/2017  •  atorvastatin, 40 mg, Oral, DAILY, 4/18/2017  •  Cholecalciferol (VITAMIN D PO), 1 Tab, Oral, DAILY, 4/18/2017  •  fish oil, 2,000 mg, Oral, DAILY, 4/18/2017  •  CALCIUM PO, 1 Tab, Oral, DAILY, 4/18/2017  •  Glucos-Chondroit-Hyaluron-MSM (GLUCOSAMINE CHONDROITIN JOINT PO), 1 Tab, Oral, DAILY, 4/18/2017  •  levothyroxine, 25 mcg, Oral, AM ES, 4/18/2017  •  donepezil, 5 mg, Oral, Q EVENING, 4/18/2017  •  montelukast, 10 mg, Oral, DAILY, 4/18/2017    Allergies   Allergen Reactions   • Etodolac Anaphylaxis     dizziness   • Lactose Diarrhea     intolerant            Review of Systems   Constitutional: Positive for malaise/fatigue.   HENT: Negative.    Eyes: Negative.    Respiratory: Negative.    Cardiovascular: Negative.    Gastrointestinal: Positive for abdominal pain and diarrhea.   Musculoskeletal: Positive for back pain and joint pain. Negative for myalgias and falls.   Skin: Negative.    Neurological: Negative.    Endo/Heme/Allergies: Negative.    Psychiatric/Behavioral: Negative.           Objective:     /64 mmHg  Pulse 86  Temp(Src) 36.6 °C (97.9 °F)  Resp 16  Ht 1.524 m (5')  Wt 67.132 kg (148 lb)  BMI 28.90 kg/m2  SpO2 98%     Physical Exam       Nursing note and vitals reviewed.    Constitutional:  Appropriately groomed, pleasant affect, and in no acute distress.    Head: normocephalic and atraumatic.    Eye:   PERRLA, EOM's full, sclera white, no scleral icterus, conjunctiva not erythematous, and medial canthus without exudate  bilaterally.    Ears:  Hearing grossly intact to voice.    Throat:  Oropharynx not erythematous, with no enlargement of the palatine tonsils bilaterally with no exudates.    Posterior oropharynx with no post nasal drainage present.  Soft palate rises symmetrically bilaterally and uvula midline.  Neck supple, with mild proximal anterior cervical chain lymphadenopathy that is soft and mobile to palpation.  Thyroid non-palpable.  No supraclavicular lymphadenopathy.    Lungs:  Lungs with normal respiratory excursion and effort.    Heart:  RRR, without murmurs, rubs, or gallops.  Carotid arteries without bruits bilaterally.  Radial and dorsalis pedis pulses 2+ bilaterally    Abdomen:  Protuberant without striations, ecchymosis, or lesions.  Bowel sounds present all 4Qs.  Normotympanic to percussion all 4Qs.  LLQ  TTP.  No masses to palpation.  No hepatosplenomegaly, no TTP at McBurney's point, negative Davis's sign, no rebound tenderness, and no guarding.  No CVA tenderness bilaterally.    MSK:  Gait and station antalgic favoring right leg.  No ttp over the greater trochanter or bursa.  No pain with extension or flexion.    Derm:  No rashes or lesions with good turgor pressure.     Psychiatric:  Normal judgement, mood and affect.      Assessment/Plan:     1. Left hip pain    - POCT Urinalysis  - CBC WITH DIFFERENTIAL; Future  - COMP METABOLIC PANEL; Future  - CRP HIGH SENSITIVE (CARDIAC); Future    2. Diverticulitis of intestine without perforation or abscess without bleeding, unspecified part of intestinal tract  Patient presents with LLQ pain x 5 days.  Did have nausea on Saturday but no fever or chills.  Diarrhea today.  Worsening pain with ambulation.  Presents with daughter, not a great historian.  Hx of significant bilateral hip arthritis.  On exam patient afebrile with LLQ ttp.  No greater trochanteric bursitis or evidence of hip pathology.  Mild periumbilical ttp.  Ua demonstrates trace leukocyte esterace with a  Ph of 6.0 and sp gravity of 1.20.  Recent cmp reviewed demonstrating GFR of >60 and Cr 0.72.  Suspect diverticulitis flare with associated LLQ structure inflammation.  No evidence of acute abdomen.  Did order cbc, cmp, and crp to r/o peritonitis or early abscess.  Rx cipro 250mg BID x 10 days and flagyl 500mg TID x 10 days.  Advised rtc in 2-3 days for recheck.  Will contact daughter with lab results.    Patient was in agreement with this treatment plan and seemed to understand without barriers. All questions were encouraged and answered.  Reviewed signs and symptoms of when to seek emergency medical care.     Please note that this dictation was created using voice recognition software.  I have made every reasonable attempt to correct obvious errors, but I expect there are errors of joe and possibly content that I did not discover before finalizing the note.       - CBC WITH DIFFERENTIAL; Future  - COMP METABOLIC PANEL; Future  - CRP HIGH SENSITIVE (CARDIAC); Future    3. Left lower quadrant pain    - CRP HIGH SENSITIVE (CARDIAC); Future

## 2017-04-18 NOTE — PATIENT INSTRUCTIONS
Stool softner: docusate (colace) all the time.  Prunes to help with regular bowel.  Start the antibiotics and take cipro morning and evening.  Take the flagyl (metronidazole) morning, lunch, dinner.  Take your calcium vitamin at lunch.  Return in 2-3 days for recheck.    Diverticulitis  Diverticulitis is inflammation or infection of small pouches in your colon that form when you have a condition called diverticulosis. The pouches in your colon are called diverticula. Your colon, or large intestine, is where water is absorbed and stool is formed.  Complications of diverticulitis can include:  · Bleeding.  · Severe infection.  · Severe pain.  · Perforation of your colon.  · Obstruction of your colon.  CAUSES   Diverticulitis is caused by bacteria.  Diverticulitis happens when stool becomes trapped in diverticula. This allows bacteria to grow in the diverticula, which can lead to inflammation and infection.  RISK FACTORS  People with diverticulosis are at risk for diverticulitis. Eating a diet that does not include enough fiber from fruits and vegetables may make diverticulitis more likely to develop.  SYMPTOMS   Symptoms of diverticulitis may include:  · Abdominal pain and tenderness. The pain is normally located on the left side of the abdomen, but may occur in other areas.  · Fever and chills.  · Bloating.  · Cramping.  · Nausea.  · Vomiting.  · Constipation.  · Diarrhea.  · Blood in your stool.  DIAGNOSIS   Your health care provider will ask you about your medical history and do a physical exam. You may need to have tests done because many medical conditions can cause the same symptoms as diverticulitis. Tests may include:  · Blood tests.  · Urine tests.  · Imaging tests of the abdomen, including X-rays and CT scans.  When your condition is under control, your health care provider may recommend that you have a colonoscopy. A colonoscopy can show how severe your diverticula are and whether something else is causing  your symptoms.  TREATMENT   Most cases of diverticulitis are mild and can be treated at home. Treatment may include:  · Taking over-the-counter pain medicines.  · Following a clear liquid diet.  · Taking antibiotic medicines by mouth for 7-10 days.  More severe cases may be treated at a hospital. Treatment may include:  · Not eating or drinking.  · Taking prescription pain medicine.  · Receiving antibiotic medicines through an IV tube.  · Receiving fluids and nutrition through an IV tube.  · Surgery.  HOME CARE INSTRUCTIONS   · Follow your health care provider's instructions carefully.  · Follow a full liquid diet or other diet as directed by your health care provider. After your symptoms improve, your health care provider may tell you to change your diet. He or she may recommend you eat a high-fiber diet. Fruits and vegetables are good sources of fiber. Fiber makes it easier to pass stool.  · Take fiber supplements or probiotics as directed by your health care provider.  · Only take medicines as directed by your health care provider.  · Keep all your follow-up appointments.  SEEK MEDICAL CARE IF:   · Your pain does not improve.  · You have a hard time eating food.  · Your bowel movements do not return to normal.  SEEK IMMEDIATE MEDICAL CARE IF:   · Your pain becomes worse.  · Your symptoms do not get better.  · Your symptoms suddenly get worse.  · You have a fever.  · You have repeated vomiting.  · You have bloody or black, tarry stools.  MAKE SURE YOU:   · Understand these instructions.  · Will watch your condition.  · Will get help right away if you are not doing well or get worse.     This information is not intended to replace advice given to you by your health care provider. Make sure you discuss any questions you have with your health care provider.     Document Released: 09/27/2006 Document Revised: 12/23/2014 Document Reviewed: 11/12/2014  [employer] Interactive Patient Education ©2016 [employer]

## 2017-04-19 ENCOUNTER — HOSPITAL ENCOUNTER (EMERGENCY)
Facility: MEDICAL CENTER | Age: 82
End: 2017-04-19
Attending: EMERGENCY MEDICINE
Payer: MEDICARE

## 2017-04-19 ENCOUNTER — APPOINTMENT (OUTPATIENT)
Dept: RADIOLOGY | Facility: MEDICAL CENTER | Age: 82
End: 2017-04-19
Attending: EMERGENCY MEDICINE
Payer: MEDICARE

## 2017-04-19 VITALS
TEMPERATURE: 97.9 F | OXYGEN SATURATION: 97 % | RESPIRATION RATE: 18 BRPM | SYSTOLIC BLOOD PRESSURE: 90 MMHG | BODY MASS INDEX: 28.78 KG/M2 | HEIGHT: 60 IN | WEIGHT: 146.61 LBS | HEART RATE: 82 BPM | DIASTOLIC BLOOD PRESSURE: 62 MMHG

## 2017-04-19 DIAGNOSIS — M25.552 PAIN OF LEFT HIP JOINT: ICD-10-CM

## 2017-04-19 PROCEDURE — 73700 CT LOWER EXTREMITY W/O DYE: CPT | Mod: LT

## 2017-04-19 PROCEDURE — 99284 EMERGENCY DEPT VISIT MOD MDM: CPT

## 2017-04-19 RX ORDER — METRONIDAZOLE 500 MG/1
500 TABLET ORAL 3 TIMES DAILY
Status: SHIPPED | COMMUNITY
Start: 2017-04-18 | End: 2017-05-08

## 2017-04-19 RX ORDER — OLOPATADINE HYDROCHLORIDE 2 MG/ML
1 SOLUTION/ DROPS OPHTHALMIC PRN
Status: SHIPPED | COMMUNITY
End: 2017-12-03

## 2017-04-19 RX ORDER — CIPROFLOXACIN 250 MG/1
250 TABLET, FILM COATED ORAL 2 TIMES DAILY
Status: SHIPPED | COMMUNITY
Start: 2017-04-18 | End: 2017-05-08

## 2017-04-19 RX ORDER — PREDNISONE 20 MG/1
20 TABLET ORAL DAILY
Qty: 6 TAB | Refills: 0 | Status: SHIPPED | OUTPATIENT
Start: 2017-04-19 | End: 2017-05-08

## 2017-04-19 RX ORDER — HYDROCODONE BITARTRATE AND ACETAMINOPHEN 7.5; 325 MG/1; MG/1
1 TABLET ORAL EVERY 6 HOURS PRN
Qty: 30 TAB | Refills: 0 | Status: SHIPPED | OUTPATIENT
Start: 2017-04-19 | End: 2017-04-26

## 2017-04-19 ASSESSMENT — PAIN SCALES - GENERAL: PAINLEVEL_OUTOF10: 7

## 2017-04-19 NOTE — ED AVS SNAPSHOT
[employer] Access Code: [email or web address]  Expires: 4/26/2017  3:40 PM    Your email address is not on file at [email or web address].  Email Addresses are required for you to sign up for [employer], please contact [phone] to verify your personal information and to provide your email address prior to attempting to register for [employer].    Marii Martinpaz Neal  [address] [place] [address]    [employer]  A secure, online tool to manage your health information     [facility]’s [employer]® is a secure, online tool that connects you to your personalized health information from the privacy of your home -- day or night - making it very easy for you to manage your healthcare. Once the activation process is completed, you can even access your medical information using the [employer] steph, which is available for free in the Apple Steph store or Google Play store.     To learn more about [employer], visit [email or web address]    There are two levels of access available (as shown below):   My Chart Features  [facility] Primary Care Doctor [facility]  Specialists [facility]  Urgent  Care [facility] Primary Care Doctor   Email your healthcare team securely and privately 24/7 X X X    Manage appointments: schedule your next appointment; view details of past/upcoming appointments X      Request prescription refills. X      View recent personal medical records, including lab and immunizations X X X X   View health record, including health history, allergies, medications X X X X   Read reports about your outpatient visits, procedures, consult and ER notes X X X X   See your discharge summary, which is a recap of your hospital and/or ER visit that includes your diagnosis, lab results, and care plan X X  X     How to register for [employer]:  Once your e-mail address has been verified, follow the following steps to sign up for [employer].     1. Go to  [email or web address]  2. Click on the Sign Up Now box, which takes you to the New Member Sign Up page.  You will need to provide the following information:  a. Enter your [employer] Access Code exactly as it appears at the top of this page. (You will not need to use this code after you’ve completed the sign-up process. If you do not sign up before the expiration date, you must request a new code.)   b. Enter your date of birth.   c. Enter your home email address.   d. Click Submit, and follow the next screen’s instructions.  3. Create a [employer] ID. This will be your [employer] login ID and cannot be changed, so think of one that is secure and easy to remember.  4. Create a [employer] password. You can change your password at any time.  5. Enter your Password Reset Question and Answer. This can be used at a later time if you forget your password.   6. Enter your e-mail address. This allows you to receive e-mail notifications when new information is available in [employer].  7. Click Sign Up. You can now view your health information.    For assistance activating your [employer] account, call [phone]

## 2017-04-19 NOTE — DISCHARGE INSTRUCTIONS
Artritis inespecífica  (Arthritis, Nonspecific)  La artritis es la inflamación de rell articulación. Los síntomas son dolor, enrojecimiento, calor o hinchazón. Pueden verse involucradas rell o más articulaciones. Hay diferentes tipos de artritis. El médico no podrá diagnosticar inmediatamente cuál es el tipo de artritis que usted sufre.   CAUSAS  La causa más frecuente es el desgaste de la articulación (osteoartritis). Marcola ocasiona lesiones en el cartílago, que puede romperse con el tiempo. Las zonas más afectadas por nicolle tipo de artritis son las rodillas, caderas, espalda y chelsea.  Otros tipos de artritis y causas frecuentes de dolor en la articulación son:  · Esguinces y otras lesiones cercanas a la articulación}. En algunos casos, esguinces y lesiones menores causan dolor e hinchazón que aparece horas más tarde.  · Artritis reumatoidea Afecta las ranjith, pies y rodillas. Generalmente afecta ambos lados del cuerpo al mismo tiempo. Generalmente se asocia a enfermedades crónicas, fiebre, pérdida de peso y debilidad general.  · Artritis por brisa. La gota y la pseudogota pueden causar dolor intenso corinne ocasional, enrojecimiento e hinchazón del pie, el tobillo o la rodilla.  · Artritis infecciosa. Las bacterias pueden penetrar en la articulación a través de rell herida en la piel. Marcola puede causar rell infección en la articulación. Las bacterias y virus también pueden diseminarse a través del torrente sanguíneo y afectar las articulaciones.  · Reacciones a medicamentos, infecciosas y alérgicas. En algunos casos las articulaciones duelen levemente y están ligeramente hinchadas en nicolle tipo de enfermedad.  SÍNTOMAS  · El dolor es el síntoma principal.  · La articulación también pueden verse daniel, hinchada y caliente al tacto.  · En ciertos tipos de artritis hay fiebre o malestar general.  · En la articulación que presenta artritis sentirá dolor con el movimiento. En otros tipos de artritis hay  rigidez.  DIAGNÓSTICO:  El médico sospechará artritis basándose en la descripción de los síntomas y en el examen. Será necesario realizar pruebas para diagnosticar el tipo de artritis.  · Análisis de jerardo y en algunos casos de orina.  · Radiografías y en algunos casos tomografía computada o diagnóstico por imágenes.  · La remoción del líquido de la articulación (artrocentesis) se realiza para controlar la presencia de bacterias, brisa o por otras causas. Gifford médico (o un especialista) adormecerán la abigail de la articulación con un anestésico local y utilizarán marni aguja para retirar líquido de la articulación para ser examinado. Khloe procedimiento es sólo mínimamente molesto.  · Aún con estas pruebas, el médico no podrá decir qué tipo de artritis usted sufre. La consulta con un especialista (reumatólogo) puede ser de utilidad.  TRATAMIENTO  El médico comentará con usted el tratamiento específico para gifford tipo de artritis. Si el tipo específico no puede determinarse, podrán aplicarse las siguientes recomendaciones generales.   El tratamiento para el dolor intenso de las articulaciones consiste en:  · Hacer reposo  · Elevar el miembro.  · Podrán prescribirle medicamentos antiinflamatorios (calista ibuprofeno). Evite las actividades que aumenten el dolor.  · Sólo tome medicamentos de venta lisa o prescriptos para calmar el dolor y las molestias, según las indicaciones de gifford médico.  · Puede aplicarse compresas frías sobre la articulación dolorida kalee 10 a 15 minutos cada hora. Las compresas calientes también pueden ser beneficiosas, joleen no las utilice kalee la noche. No use compresas calientes sin autorización de gifford médico, si es diabético.  · Marni inyección de corticoides en la articulación artrítica puede ayudar a reducir el dolor y la hinchazón.  · Si marni artritis aguda empeora en los siguientes 1 ó 2 días, será necesario descartar marni infección.  El tratamiento prolongado implica la modificación de  actividades y del estilo de conner para reducir el estrés en la articulación. Puede ser necesario que baje de peso. La actividad física es necesaria para nutrir el cartílago de la articulación y eliminar los desechos. [place] ayuda a mantener areli los músculos que rodean la articulación.  INSTRUCCIONES PARA EL CUIDADO DOMICILIARIO  · No tome aspirina para aliviar el dolor si se sospecha que sufre gota. [place] eleva los niveles de ácido úrico.  · Solo tome medicamentos que se pueden comprar sin receta o recetados para el dolor, malestar o fiebre, calista le indica el médico.  · Irvin reposo todo el tiempo que pueda.  · Si la articulación está hinchada, manténgala elevada.  · Utilice muletas si la articulación que le duele está en la pierna.  · Beber abundante cantidad de líquidos será beneficioso para ciertos tipos de artritis.  · Siga las indicaciones del profesional.  · La actividad física regular puede ser beneficiosa, incluyendo las actividades de bajo impacto calista:  ¨ Natación.  ¨ Aquagym.  ¨ Andar en bicicleta.  ¨ Caminar.  · La rigidez matutina se humphrey con rell ducha caliente.  · También es beneficioso que realice ejercicios de amplitud de movimiento.  SOLICITE ATENCIÓN MÉDICA SI:  · No se siente mejor o empeora luego de las 24 horas.  · Presenta efectos adversos por los medicamentos y no mejora con el tratamiento.  SOLICITE ATENCIÓN MÉDICA INMEDIATAMENTE SI:  · Tiene fiebre.  · Presenta fiebre o dolor intenso, hinchazón o enrojecimiento.  · Muchas articulaciones están involucradas y están hinchadas y siente dolor.  · Tiene un dolor intenso en la espalda o siente debilidad en las piernas.  · Pierde el control de la vejiga o del intestino.     Esta información no tiene calista fin reemplazar el consejo del médico. Asegúrese de hacerle al médico cualquier pregunta que tenga.     Document Released: 12/18/2006 Document Revised: 03/11/2013  [employer] Interactive Patient Education ©2016 [employer]

## 2017-04-19 NOTE — TELEPHONE ENCOUNTER
Called and spoke to daughter.  Patient still having pain.  Taking the antibiotic.  Did advise tylenol as needed.  Recommended continuing with the antibiotics. If worsening pain, fever, nausea the ER. Informed the daughter Johanne that lab result showed elevation in the C-reactive protein which is a sign of inflammation, but no elevation of white count concerning for major infection.  All questions encouraged and answered.

## 2017-04-19 NOTE — ED AVS SNAPSHOT
4/19/2017    Marii Neal  [address] [facility] [address]    Dear Marii:    [facility] wants to ensure your discharge home is safe and you or your loved ones have had all of your questions answered regarding your care after you leave the hospital.    Below is a list of resources and contact information should you have any questions regarding your hospital stay, follow-up instructions, or active medical symptoms.    Questions or Concerns Regarding… Contact   Medical Questions Related to Your Discharge  (7 days a week, 8am-5pm) Contact a Nurse Care Coordinator   [phone]   Medical Questions Not Related to Your Discharge  (24 hours a day / 7 days a week)  Contact the Nurse Health Line   [phone]    Medications or Discharge Instructions Refer to your discharge packet   or contact your [facility] Primary Care Provider   [phone]   Follow-up Appointment(s) Schedule your appointment via [employer]   or contact Scheduling [phone]   Billing Review your statement via [employer]  or contact Billing [phone]   Medical Records Review your records via [employer]   or contact Medical Records [phone]     You may receive a telephone call within two days of discharge. This call is to make certain you understand your discharge instructions and have the opportunity to have any questions answered. You can also easily access your medical information, test results and upcoming appointments via the [employer] free online health management tool. You can learn more and sign up at [employer]. For assistance setting up your [employer] account, please call [phone].    Once again, we want to ensure your discharge home is safe and that you have a clear understanding of any next steps in your care. If you have any questions or concerns, please do not hesitate to contact us, we are here for you. Thank you for choosing [facility] for your healthcare needs.    Sincerely,    Your [facility] Healthcare Team

## 2017-04-19 NOTE — ED AVS SNAPSHOT
Home Care Instructions                                                                                                                Marii Neal   MRN: [id number]    Department:  [facility], Emergency Dept   Date of Visit:  4/19/2017            [facility], Emergency Dept    [address]    [place] [address]    Phone:  [phone]      You were seen by     Gary Gansert, M.D.      Your Diagnosis Was     Pain of left hip joint     M25.552       Follow-up Information     1. Follow up with Joss Meadows M.D.. Schedule an appointment as soon as possible for a visit in 1 day.    Specialty:  Orthopaedics    Contact information    [address]  [address]  [place] [address]  [phone]          2. Follow up with Nirmal Meraz M.D.. Schedule an appointment as soon as possible for a visit in 1 day.    Specialty:  Orthopaedics    Contact information    [address]  [address]  [place] [address]  [phone]        Medication Information     Review all of your home medications and newly ordered medications with your primary doctor and/or pharmacist as soon as possible. Follow medication instructions as directed by your doctor and/or pharmacist.     Please keep your complete medication list with you and share with your physician. Update the information when medications are discontinued, doses are changed, or new medications (including over-the-counter products) are added; and carry medication information at all times in the event of emergency situations.               Medication List      START taking these medications        Instructions    Morning Afternoon Evening Bedtime    hydrocodone-acetaminophen 7.5-325 MG per tablet   Commonly known as:  NORCO        Take 1 Tab by mouth every 6 hours as needed for up to 7 days.   Dose:  1 Tab                        predniSONE 20 MG Tabs   Commonly known as:  DELTASONE        Take 1 Tab by mouth every day.   Dose:   20 mg                          ASK your doctor about these medications        Instructions    Morning Afternoon Evening Bedtime    atorvastatin 40 MG Tabs   Commonly known as:  LIPITOR        Take 40 mg by mouth every day.   Dose:  40 mg                        CALCIUM PO        Take 1 Tab by mouth every day.   Dose:  1 Tab                        ciprofloxacin 250 MG Tabs   Commonly known as:  CIPRO        Take 250 mg by mouth 2 times a day. Pt started on 4/18/2017 for 10 day course for UTI   Dose:  250 mg                        Diclofenac Sodium 1 % Gel   Commonly known as:  VOLTAREN        Apply 1 Application to skin as directed 2 times a day. For pain in legs and hands.   Dose:  1 Application                        donepezil 5 MG Tabs   Commonly known as:  ARICEPT        Take 1 Tab by mouth every evening.   Dose:  5 mg                        duloxetine 60 MG Cpep delayed-release capsule   Commonly known as:  CYMBALTA        Take 1 Cap by mouth every day.   Dose:  60 mg                        fish oil 1000 MG Caps capsule        Take 2,000 mg by mouth every day.   Dose:  2000 mg                        FOSAMAX 70 MG Tabs   Generic drug:  alendronate        Take 70 mg by mouth every 7 days. On Sun   Dose:  70 mg                        GLUCOSAMINE CHONDROITIN JOINT PO        Take 1 Tab by mouth every day.   Dose:  1 Tab                        levothyroxine 25 MCG Tabs   Commonly known as:  SYNTHROID        Take 1 Tab by mouth Every morning on an empty stomach.   Dose:  25 mcg                        LYRICA 100 MG Caps   Generic drug:  pregabalin        TAKE ONE CAPSULE BY MOUTH THREE TIMES DAILY                        metronidazole 500 MG Tabs   Commonly known as:  FLAGYL        Take 500 mg by mouth 3 times a day. Pt started on 4/18/2017 for 10 day course for UTI   Dose:  500 mg                        montelukast 10 MG Tabs   Commonly known as:  SINGULAIR        Take 1 Tab by mouth every day.   Dose:  10 mg                           PATADAY 0.2 % Soln   Generic drug:  Olopatadine HCl        1 Drop by Ophthalmic route as needed (For allergies and dry eye).   Dose:  1 Drop                        polyethylene glycol 3350 Powd   Commonly known as:  MIRALAX        Take 17 g by mouth every day.   Dose:  17 g                        VITAMIN D PO        Take 1 Tab by mouth every day.   Dose:  1 Tab                             Where to Get Your Medications      You can get these medications from any pharmacy     Bring a paper prescription for each of these medications    - hydrocodone-acetaminophen 7.5-325 MG per tablet  - predniSONE 20 MG Tabs            Procedures and tests performed during your visit     CT-HIP W/O PLUS RECONS LEFT        Discharge Instructions       Artritis inespecífica  (Arthritis, Nonspecific)  La artritis es la inflamación de rell articulación. Los síntomas son dolor, enrojecimiento, calor o hinchazón. Pueden verse involucradas rell o más articulaciones. Hay diferentes tipos de artritis. El médico no podrá diagnosticar inmediatamente cuál es el tipo de artritis que usted sufre.   CAUSAS  La causa más frecuente es el desgaste de la articulación (osteoartritis). Lamar ocasiona lesiones en el cartílago, que puede romperse con el tiempo. Las zonas más afectadas por nicolle tipo de artritis son las rodillas, caderas, espalda y chelsea.  Otros tipos de artritis y causas frecuentes de dolor en la articulación son:  · Esguinces y otras lesiones cercanas a la articulación}. En algunos casos, esguinces y lesiones menores causan dolor e hinchazón que aparece horas más tarde.  · Artritis reumatoidea Afecta las ranjith, pies y rodillas. Generalmente afecta ambos lados del cuerpo al mismo tiempo. Generalmente se asocia a enfermedades crónicas, fiebre, pérdida de peso y debilidad general.  · Artritis por brisa. La gota y la pseudogota pueden causar dolor intenso corinne ocasional, enrojecimiento e hinchazón del pie, el tobillo o la  sebastian.  · Artritis infecciosa. Las bacterias pueden penetrar en la articulación a través de rell herida en la piel. [place] puede causar rell infección en la articulación. Las bacterias y virus también pueden diseminarse a través del torrente sanguíneo y afectar las articulaciones.  · Reacciones a medicamentos, infecciosas y alérgicas. En algunos casos las articulaciones duelen levemente y están ligeramente hinchadas en nicolle tipo de enfermedad.  SÍNTOMAS  · El dolor es el síntoma principal.  · La articulación también pueden verse daniel, hinchada y caliente al tacto.  · En ciertos tipos de artritis hay fiebre o malestar general.  · En la articulación que presenta artritis sentirá dolor con el movimiento. En otros tipos de artritis hay rigidez.  DIAGNÓSTICO:  El médico sospechará artritis basándose en la descripción de los síntomas y en el examen. Será necesario realizar pruebas para diagnosticar el tipo de artritis.  · Análisis de jerardo y en algunos casos de orina.  · Radiografías y en algunos casos tomografía computada o diagnóstico por imágenes.  · La remoción del líquido de la articulación (artrocentesis) se realiza para controlar la presencia de bacterias, brisa o por otras causas. Giffodr médico (o un especialista) adormecerán la abigail de la articulación con un anestésico local y utilizarán rell aguja para retirar líquido de la articulación para ser examinado. Nicolle procedimiento es sólo mínimamente molesto.  · Aún con estas pruebas, el médico no podrá decir qué tipo de artritis usted sufre. La consulta con un especialista (reumatólogo) puede ser de utilidad.  TRATAMIENTO  El médico comentará con usted el tratamiento específico para gifford tipo de artritis. Si el tipo específico no puede determinarse, podrán aplicarse las siguientes recomendaciones generales.   El tratamiento para el dolor intenso de las articulaciones consiste en:  · Hacer reposo  · Elevar el miembro.  · Podrán prescribirle medicamentos antiinflamatorios  (calista ibuprofeno). Evite las actividades que aumenten el dolor.  · Sólo tome medicamentos de venta lisa o prescriptos para calmar el dolor y las molestias, según las indicaciones de gifford médico.  · Puede aplicarse compresas frías sobre la articulación dolorida kalee 10 a 15 minutos cada hora. Las compresas calientes también pueden ser beneficiosas, joleen no las utilice kalee la noche. No use compresas calientes sin autorización de gifford médico, si es diabético.  · Rell inyección de corticoides en la articulación artrítica puede ayudar a reducir el dolor y la hinchazón.  · Si rell artritis aguda empeora en los siguientes 1 ó 2 días, será necesario descartar rell infección.  El tratamiento prolongado implica la modificación de actividades y del estilo de conner para reducir el estrés en la articulación. Puede ser necesario que baje de peso. La actividad física es necesaria para nutrir el cartílago de la articulación y eliminar los desechos. [place] ayuda a mantener areli los músculos que rodean la articulación.  INSTRUCCIONES PARA EL CUIDADO DOMICILIARIO  · No tome aspirina para aliviar el dolor si se sospecha que sufre gota. [place] eleva los niveles de ácido úrico.  · Solo tome medicamentos que se pueden comprar sin receta o recetados para el dolor, malestar o fiebre, calista le indica el médico.  · Irvin reposo todo el tiempo que pueda.  · Si la articulación está hinchada, manténgala elevada.  · Utilice muletas si la articulación que le duele está en la pierna.  · Beber abundante cantidad de líquidos será beneficioso para ciertos tipos de artritis.  · Siga las indicaciones del profesional.  · La actividad física regular puede ser beneficiosa, incluyendo las actividades de bajo impacto calista:  ¨ Natación.  ¨ Aquagym.  ¨ Andar en bicicleta.  ¨ Caminar.  · La rigidez matutina se humphrey con rell ducha caliente.  · También es beneficioso que realice ejercicios de amplitud de movimiento.  SOLICITE ATENCIÓN MÉDICA SI:  · No se siente  mejor o empeora luego de las 24 horas.  · Presenta efectos adversos por los medicamentos y no mejora con el tratamiento.  SOLICITE ATENCIÓN MÉDICA INMEDIATAMENTE SI:  · Tiene fiebre.  · Presenta fiebre o dolor intenso, hinchazón o enrojecimiento.  · Muchas articulaciones están involucradas y están hinchadas y siente dolor.  · Tiene un dolor intenso en la espalda o siente debilidad en las piernas.  · Pierde el control de la vejiga o del intestino.     Esta información no tiene calista fin reemplazar el consejo del médico. Asegúrese de hacerle al médico cualquier pregunta que tenga.     Document Released: 12/18/2006 Document Revised: 03/11/2013  [employer] Interactive Patient Education ©2016 [employer]            Patient Information     Patient Information    Following emergency treatment: all patient requiring follow-up care must return either to a private physician or a clinic if your condition worsens before you are able to obtain further medical attention, please return to the emergency room.     Billing Information    At [facility], we work to make the billing process streamlined for our patients.  Our Representatives are here to answer any questions you may have regarding your hospital bill.  If you have insurance coverage and have supplied your insurance information to us, we will submit a claim to your insurer on your behalf.  Should you have any questions regarding your bill, we can be reached online or by phone as follows:  Online: You are able pay your bills online or live chat with our representatives about any billing questions you may have. We are here to help Monday - Friday from 8:00am to 7:30pm and 9:00am - 12:00pm on Saturdays.  Please visit [email or web address]  for more information.   Phone:  [phone] or [phone]    Please note that your emergency physician, surgeon, pathologist, radiologist, anesthesiologist, and other specialists are not employed by  [facility] and will therefore bill separately for their services.  Please contact them directly for any questions concerning their bills at the numbers below:     Emergency Physician Services:  [phone]  [facility]:  [phone]  [facility]:  [phone]  [facility]:  [phone]    1. Your final bill may vary from the amount quoted upon discharge if all procedures are not complete at that time, or if your doctor has additional procedures of which we are not aware. You will receive an additional bill if you return to the Emergency Department at [facility] for suture removal regardless of the facility of which the sutures were placed.     2. Please arrange for settlement of this account at the emergency registration.    3. All self-pay accounts are due in full at the time of treatment.  If you are unable to meet this obligation then payment is expected within 4-5 days.     4. If you have had radiology studies (CT, X-ray, Ultrasound, MRI), you have received a preliminary result during your emergency department visit. Please contact the radiology department [phone] to receive a copy of your final result. Please discuss the Final result with your primary physician or with the follow up physician provided.     Crisis Hotline:  [place] Crisis Hotline:  2-559-PZSLOVG or 1-991.552.1603  [place] Crisis Hotline:    1-729.644.7811 or 777-133-1375         ED Discharge Follow Up Questions    1. In order to provide you with very good care, we would like to follow up with a phone call in the next few days.  May we have your permission to contact you?     YES /  NO    2. What is the best phone number to call you? (       )_____-__________    3. What is the best time to call you?      Morning  /  Afternoon  /  Evening                   Patient Signature:  ____________________________________________________________    Date:   ____________________________________________________________      Your appointments     Apr 21, 2017  5:30 PM   MR BRAIN W/O 30 with [facility]   [facility]    [address]  [place] [address]   [phone]            Jun 22, 2017  3:30 PM   MA [id number] with [facility] MG 3   [facility] ([address])    [address]  [place] [address]   [phone]           No deodorant, powder, perfume or lotion under the arm or breast area.            Jul 20, 2017  2:00 PM   New Patient with Carlos Almodovar M.D.   [facility] (--)    [address], [address]  [place] [address]   [phone]           Please bring Photo ID, Insurance Cards, All Medication Bottles and copies of any legal documents (such as Living Will, Power of ) If speaking a language besides English please bring an adult . Please arrive 30 minutes prior for check in and registration. You will be receiving a confirmation call a few days before your appointment from our automated call confirmation system.

## 2017-04-19 NOTE — ED NOTES
Discharge instructions provided.  Pt verbalized the understanding of discharge instructions to follow up with Orthopedic MD,  PCP, and to return to ER if condition worsens.  Patient out of ED via wheelchair.

## 2017-04-19 NOTE — ED PROVIDER NOTES
ED Provider Note  CHIEF COMPLAINT  Chief Complaint   Patient presents with   • Hip Pain       HPI  Marii Neal is a 81 y.o. female who presents with gradually increasing mild to moderate pain in the left hip which is worse with weightbearing and worse with ambulation and worse with movement of the hip. No trauma. Has a history of arthritis and degenerative disc disease in her back. No back pain. Pain for the past several days.    REVIEW OF SYSTEMS  No headache, no chest pain, no difficulty breathing. Otherwise healthy. Good appetite.  ALL OTHER SYSTEMS NEGATIVE    ALLERGIES  Allergies   Allergen Reactions   • Etodolac Anaphylaxis     dizziness   • Lactose Diarrhea     intolerant       CURRENT MEDICATIONS  Home Medications     **Home medications have not yet been reviewed for this encounter**          PAST MEDICAL HISTORY  Past Medical History   Diagnosis Date   • Arthritis      hand pain and numbness   • Diverticulitis    • Pancreatitis 2002   • Lumbar stenosis      MRI lumbar spine 2011 showed multilevel OA and multiple bulging with stenosis   • Hyperlipidemia LDL goal < 100    • Osteoporosis    • ASCUS on Pap smear 5/8/09   • H. pylori infection 3/17/08     treated with prevpac   • Metabolic syndrome    • Fatty liver    • Normal cardiac stress test 1/8/10   • DDD (degenerative disc disease)    • Allergic rhinitis    • Vitamin d deficiency    • Peripheral neuropathy (CMS-HCC)        SURGICAL HISTORY  Past Surgical History   Procedure Laterality Date   • Cholecystectomy     • Lumbar disc replacement         FAMILY HISTORY  Family History   Problem Relation Age of Onset   • Family history unknown: Yes       SOCIAL HISTORY  Negative    PHYSICAL EXAM  GENERAL: Alert elderly  female  VITAL SIGNS: /56 mmHg  Pulse 91  Temp(Src) 36.6 °C (97.9 °F)  Resp 16  Ht 1.524 m (5')  Wt 66.5 kg (146 lb 9.7 oz)  BMI 28.63 kg/m2  SpO2 94%  Constitutional: Alert healthy-appearing adult   HENT: Scalp is  normal size and nontender. Ears are clear. Nose is clear. Throat is clear with no stridor no drooling no trismus. Teeth are all intact.  Eyes: Pupils equal round and reactive to light, extraocular motor fall. There is no scleral icterus.  Neck: Neck is supple and nontender. There is no meningismus. No adenitis. No thyromegaly.  Cardiovascular: Heart regular rhythm without murmurs or gallops   Thorax & Lungs: No chest wall tenderness. Lungs are clear. Patient has good breath sounds bilateral. No rales, no rhonchi, no wheezes.  Skin: Warm, pink, and dry with no erythema and no rash.   Back: Nontender, no midline bony tenderness, no flank tenderness.                                         Extremities: Full range of motion  No tenderness to palpation and no deformities noted. No calf or thigh swelling. No calf or thigh tenderness. No clinical DVT. With motions of the left hip. No deformity or swelling. No sign of infection. Good distal pedal pulse. No DVT.  Neurologic: Alert & oriented . Cranial nerves are grossly intact as tested. Patient moves all 4 extremities well. Patient has good strong flexion and extension of the ankle joints knee joints hip joints and elbow joints. Sensation is normal and symmetrical in the upper and lower extremities.   Psychiatric: Patient is alert oriented coherent and rational.       RADIOLOGY/PROCEDURES  CT-HIP W/O PLUS RECONS LEFT   Final Result      1.  No evidence of pelvic or proximal femoral fracture.      2.  Moderate to severe degenerative change of the hip articulations bilaterally.      3.  Degenerative disc disease and surgical change involving the lumbar spine.      4.  Hip joint effusions bilaterally related to presence of degenerative change. There are extruded loose bodies seen extruded into bursa inferior to the left hip.      5.  Chondrocalcinosis.            COURSE & MEDICAL DECISION MAKING  CT of the left hip has been obtained to rule out any degenerative changes or  fracture or bony changes.    X-ray shows no fracture. She has degenerative this disease involving the lumbar spine. Degenerative changes in both hips.    Home treatment:. #1 ice and elevation #2 crutches or a walker #3 the patient given orthopedic follow-up to Dr. Murguia the orthopedic surgeon.. #4. A prescription for prednisone for 6 days and some hydrocodone when necessary. #5. Instructions on arthritis given. Stable for discharge.    FINAL IMPRESSION  1. Left hip pain  2. Degenerative joint disease   3. Generally of arthritis         Electronically signed by: Gary Gansert, 4/19/2017 3:15 PM

## 2017-04-21 ENCOUNTER — HOSPITAL ENCOUNTER (OUTPATIENT)
Dept: RADIOLOGY | Facility: MEDICAL CENTER | Age: 82
End: 2017-04-21
Attending: NURSE PRACTITIONER
Payer: MEDICARE

## 2017-04-21 DIAGNOSIS — R41.3 MEMORY LOSS: ICD-10-CM

## 2017-04-21 DIAGNOSIS — R51.9 RECURRENT HEADACHE: ICD-10-CM

## 2017-04-21 DIAGNOSIS — R45.4 IRRITABILITY: ICD-10-CM

## 2017-04-21 PROCEDURE — 70551 MRI BRAIN STEM W/O DYE: CPT

## 2017-04-23 ENCOUNTER — TELEPHONE (OUTPATIENT)
Dept: MEDICAL GROUP | Facility: MEDICAL CENTER | Age: 82
End: 2017-04-23

## 2017-05-04 ENCOUNTER — HOSPITAL ENCOUNTER (OUTPATIENT)
Facility: MEDICAL CENTER | Age: 82
End: 2017-05-04
Attending: ORTHOPAEDIC SURGERY | Admitting: ORTHOPAEDIC SURGERY
Payer: MEDICARE

## 2017-05-08 ENCOUNTER — OFFICE VISIT (OUTPATIENT)
Dept: MEDICAL GROUP | Facility: MEDICAL CENTER | Age: 82
End: 2017-05-08
Payer: MEDICARE

## 2017-05-08 VITALS
DIASTOLIC BLOOD PRESSURE: 70 MMHG | SYSTOLIC BLOOD PRESSURE: 128 MMHG | BODY MASS INDEX: 29.06 KG/M2 | HEIGHT: 60 IN | WEIGHT: 148 LBS | OXYGEN SATURATION: 94 % | TEMPERATURE: 95.7 F | HEART RATE: 104 BPM

## 2017-05-08 DIAGNOSIS — M79.671 BILATERAL FOOT PAIN: ICD-10-CM

## 2017-05-08 DIAGNOSIS — M79.672 BILATERAL FOOT PAIN: ICD-10-CM

## 2017-05-08 PROCEDURE — 1101F PT FALLS ASSESS-DOCD LE1/YR: CPT | Performed by: NURSE PRACTITIONER

## 2017-05-08 PROCEDURE — 99214 OFFICE O/P EST MOD 30 MIN: CPT | Performed by: NURSE PRACTITIONER

## 2017-05-08 PROCEDURE — 1036F TOBACCO NON-USER: CPT | Performed by: NURSE PRACTITIONER

## 2017-05-08 PROCEDURE — 4040F PNEUMOC VAC/ADMIN/RCVD: CPT | Performed by: NURSE PRACTITIONER

## 2017-05-08 PROCEDURE — G8432 DEP SCR NOT DOC, RNG: HCPCS | Performed by: NURSE PRACTITIONER

## 2017-05-08 PROCEDURE — G8419 CALC BMI OUT NRM PARAM NOF/U: HCPCS | Performed by: NURSE PRACTITIONER

## 2017-05-08 NOTE — PROGRESS NOTES
Subjective:      Marii Neal is a 81 y.o. female who presents with Foot Problem            Foot Problem    seen in f/u for pain in bryn feet.  Pain started 1 week ago.  No hx of injury.  The pain is between her toes.  She has special shoes that she wears but not today.  DASHAWN's were wnl in 2013.  Has color changes withvenous insufficiency on bryn LE.    She is going to have hip surgery soon.  Will fu for surgical clearance.     Patient Active Problem List    Diagnosis Date Noted   • Hyperlipidemia      Priority: Low   • Lumbar stenosis      Priority: Low   • DDD (degenerative disc disease)      Priority: Low   • Osteoporosis, senile 12/03/2009     Priority: Low   • URI (upper respiratory infection) 03/27/2017   • Shingles outbreak 09/03/2015   • Peripheral neuropathy 09/03/2015   • Chest pain 05/29/2015   • Fatigue 02/24/2015   • Flatulence, eructation, and gas pain 01/29/2014   • Abnormal LFTs 01/08/2014   • Vitamin d deficiency    • RLQ abdominal pain 06/07/2012   • History of pancreatitis    • Allergic rhinitis 04/14/2011   • Metabolic syndrome    • Fatty liver    • Diverticulitis    • Preventative health care 04/15/2010     Current Outpatient Prescriptions   Medication Sig Dispense Refill   • duloxetine (CYMBALTA) 60 MG Cap DR Particles delayed-release capsule TAKE ONE CAPSULE BY MOUTH ONCE DAILY 90 Cap 3   • LYRICA 100 MG Cap TAKE ONE CAPSULE BY MOUTH THREE TIMES DAILY 90 Cap 3   • Olopatadine HCl (PATADAY) 0.2 % Solution 1 Drop by Ophthalmic route as needed (For allergies and dry eye).     • Diclofenac Sodium (VOLTAREN) 1 % Gel Apply 1 Application to skin as directed 2 times a day. For pain in legs and hands. 1 Tube 3   • polyethylene glycol 3350 (MIRALAX) Powder Take 17 g by mouth every day. 1581 g 0   • alendronate (FOSAMAX) 70 MG Tab Take 70 mg by mouth every 7 days. On Sun     • atorvastatin (LIPITOR) 40 MG Tab Take 40 mg by mouth every day.     • Cholecalciferol (VITAMIN D PO) Take 1 Tab by  mouth every day.     • Omega-3 Fatty Acids (FISH OIL) 1000 MG Cap capsule Take 2,000 mg by mouth every day.     • CALCIUM PO Take 1 Tab by mouth every day.     • Glucos-Chondroit-Hyaluron-MSM (GLUCOSAMINE CHONDROITIN JOINT PO) Take 1 Tab by mouth every day.     • levothyroxine (SYNTHROID) 25 MCG Tab Take 1 Tab by mouth Every morning on an empty stomach. 90 Tab 3   • donepezil (ARICEPT) 5 MG Tab Take 1 Tab by mouth every evening. 90 Tab 3   • montelukast (SINGULAIR) 10 MG Tab Take 1 Tab by mouth every day. 30 Tab 30     No current facility-administered medications for this visit.     Allergies   Allergen Reactions   • Etodolac Anaphylaxis     dizziness   • Lactose Diarrhea     intolerant          ROS  Review of Systems   Constitutional: Negative.  Negative for fever, chills, weight loss, malaise/fatigue and diaphoresis.   HENT: Negative.  Negative for hearing loss, ear pain, nosebleeds, congestion, sore throat, neck pain, tinnitus and ear discharge.    Respiratory: Negative.  Negative for cough, hemoptysis, sputum production, shortness of breath, wheezing and stridor.    Cardiovascular: Negative.  Negative for chest pain, palpitations, orthopnea, claudication, leg swelling and PND.   Gastrointestinal: denies nausea, vomiting, diarrhea, constipation, heartburn, melena or hematochezia.  Genitourinary: Denies dysuria, hematuria, urinary incontinence, frequency or urgency.             Objective:     /70 mmHg  Pulse 104  Temp(Src) 35.4 °C (95.7 °F)  Ht 1.524 m (5')  Wt 67.132 kg (148 lb)  BMI 28.90 kg/m2  SpO2 94%  Breastfeeding? No     Physical Exam      Physical Exam   Vitals reviewed.  Constitutional: oriented to person, place, and time. appears well-developed and well-nourished. No distress.   Cardiovascular: Normal rate, regular rhythm, normal heart sounds and intact distal pulses.  Exam reveals no gallop and no friction rub.  No murmur heard.  No carotid bruits.   Pulmonary/Chest: Effort normal and  breath sounds normal. No stridor. No respiratory distress. no wheezes or rales. exhibits no tenderness.   Musculoskeletal: Normal range of motion. exhibits no edema. bryn pedal pulses 2+.  Neurological: alert and oriented to person, place, and time.  amb with walker. Gait unsteady.    Skin: Skin is warm and dry. no diaphoresis.   Darkened color changes bryn llower legs.   Psychiatric: normal mood and affect. behavior is normal.            Assessment/Plan:         1. Bilateral foot pain  US-DASHAWN MULTI LEVEL BILAT    REFERRAL TO PODIATRY    refer podiatry.  use diclofenac on bryn feet bid.  DASHAWN's to check circulation.  f/u for surgical clearance for left THR.

## 2017-05-08 NOTE — MR AVS SNAPSHOT
Marii Jolly Peter Kirkpatrick   2017 7:45 AM   Office Visit   MRN: [id number]    Department:  [facility]   Dept Phone:  [phone]    Description:  Female : 1935   Provider:  MUKUL Raymond           Reason for Visit     Foot Problem           Allergies as of 2017     Allergen Noted Reactions    Etodolac 2009   Anaphylaxis    dizziness    Lactose 2009   Diarrhea    intolerant      You were diagnosed with     Bilateral foot pain   [[id number]]   refer podiatry.  use diclofenac on bryn feet bid.  DASHAWN's to check circulation.  f/u for surgical clearance for left THR.      Vital Signs     Blood Pressure Pulse Temperature Height Weight Body Mass Index    128/70 mmHg 104 35.4 °C (95.7 °F) 1.524 m (5') 67.132 kg (148 lb) 28.90 kg/m2    Oxygen Saturation Breastfeeding? Smoking Status             94% No Never Smoker          Basic Information     Date Of Birth Sex Race Ethnicity Preferred Language    1935 Female White  Origin (French,American,Malian,Akil, etc) English      Your appointments     May 09, 2017  1:45 PM   Scheduled Pre Admission with PREADMIT 4   PREADMIT TESTS [facility] (--)    [address]  [place] [address]   [phone]            2017  3:30 PM   MA [id number] with [facility] MG 3   [facility] ( [address])    [address]  [address]  [place] [address]   [phone]           No deodorant, powder, perfume or lotion under the arm or breast area.            2017  2:00 PM   New Patient with Carlos Almodovar M.D.   [facility] (--)    [address], [address]  [place] [id number] [phone]           Please bring Photo ID, Insurance Cards, All Medication Bottles and copies of any legal documents (such as Living Will, Power of ) If speaking a language besides English please bring an adult . Please arrive 30 minutes prior for check in and registration. You will be receiving a  confirmation call a few days before your appointment from our automated call confirmation system.              Problem List              ICD-10-CM Priority Class Noted - Resolved    Osteoporosis, senile M81.0 Low  12/3/2009 - Present    Preventative health care Z00.00   4/15/2010 - Present    Diverticulitis K57.92   Unknown - Present    Metabolic syndrome E88.81   Unknown - Present    Fatty liver K76.0   Unknown - Present    DDD (degenerative disc disease) [id number] Low  Unknown - Present    Allergic rhinitis    4/14/2011 - Present    Hyperlipidemia E78.5 Low  Unknown - Present    Lumbar stenosis M48.06 Low  Unknown - Present    History of pancreatitis Z87.19   Unknown - Present    RLQ abdominal pain R10.31   6/7/2012 - Present    Vitamin d deficiency    Unknown - Present    Abnormal LFTs R79.89   1/8/2014 - Present    Flatulence, eructation, and gas pain R14.3, R14.1, R14.2   1/29/2014 - Present    Fatigue R53.83   2/24/2015 - Present    Chest pain R07.9   5/29/2015 - Present    Shingles outbreak B02.9   9/3/2015 - Present    Peripheral neuropathy G62.9   9/3/2015 - Present    URI (upper respiratory infection) J06.9   3/27/2017 - Present      Health Maintenance        Date Due Completion Dates    IMM ZOSTER VACCINE 6/1/1996 ---    IMM DTaP/Tdap/Td Vaccine (1 - Tdap) 2/19/2014 2/18/2014    IMM PNEUMOCOCCAL 65+ (ADULT) LOW/MEDIUM RISK SERIES (2 of 2 - PCV13) 7/16/2016 7/16/2015    MAMMOGRAM 6/22/2017 6/22/2016, 4/1/2015, 3/16/2015, 3/14/2014, 5/14/2012, 11/28/2011, 5/25/2011, 5/13/2011, 5/10/2010, 5/10/2010, 3/31/2009, 3/31/2009, 3/27/2008, 3/27/2008, 12/14/2006, 11/3/2005, 6/22/2004    BONE DENSITY 7/10/2019 7/10/2014, 7/9/2012, 11/25/2009    COLONOSCOPY 11/22/2020 11/22/2010 (Done)    Override on 11/22/2010: Done (GIC)            Current Immunizations     Influenza Vaccine Adult HD 10/21/2015  3:53 PM    Pneumococcal polysaccharide vaccine (PPSV-23) 7/16/2015    TD Vaccine 2/18/2014      Below and/or attached are the  medications your provider expects you to take. Review all of your home medications and newly ordered medications with your provider and/or pharmacist. Follow medication instructions as directed by your provider and/or pharmacist. Please keep your medication list with you and share with your provider. Update the information when medications are discontinued, doses are changed, or new medications (including over-the-counter products) are added; and carry medication information at all times in the event of emergency situations     Allergies:  ETODOLAC - Anaphylaxis     LACTOSE - Diarrhea               Medications  Valid as of: May 08, 2017 -  8:11 AM    Generic Name Brand Name Tablet Size Instructions for use    Alendronate Sodium (Tab) FOSAMAX 70 MG Take 70 mg by mouth every 7 days. On Sun        Atorvastatin Calcium (Tab) LIPITOR 40 MG Take 40 mg by mouth every day.        Calcium   Take 1 Tab by mouth every day.        Cholecalciferol   Take 1 Tab by mouth every day.        Diclofenac Sodium (Gel) Diclofenac Sodium 1 % Apply 1 Application to skin as directed 2 times a day. For pain in legs and hands.        Donepezil HCl (Tab) ARICEPT 5 MG Take 1 Tab by mouth every evening.        DULoxetine HCl (Cap DR Particles) CYMBALTA 60 MG TAKE ONE CAPSULE BY MOUTH ONCE DAILY        Glucos-Chondroit-Hyaluron-MSM   Take 1 Tab by mouth every day.        Levothyroxine Sodium (Tab) SYNTHROID 25 MCG Take 1 Tab by mouth Every morning on an empty stomach.        Montelukast Sodium (Tab) SINGULAIR 10 MG Take 1 Tab by mouth every day.        Olopatadine HCl (Solution) Olopatadine HCl 0.2 % 1 Drop by Ophthalmic route as needed (For allergies and dry eye).        Omega-3 Fatty Acids (Cap) fish oil 1000 MG Take 2,000 mg by mouth every day.        Polyethylene Glycol 3350 (Powder) MIRALAX  Take 17 g by mouth every day.        Pregabalin (Cap) LYRICA 100 MG TAKE ONE CAPSULE BY MOUTH THREE TIMES DAILY        .                 Medicines  prescribed today were sent to:     [facility] - [place], [place] - [address]    [address] [place] [address]    Phone: [phone] Fax: [phone]    Open 24 Hours?: No      Medication refill instructions:       If your prescription bottle indicates you have medication refills left, it is not necessary to call your provider’s office. Please contact your pharmacy and they will refill your medication.    If your prescription bottle indicates you do not have any refills left, you may request refills at any time through one of the following ways: The online [employer] system (except Urgent Care), by calling your provider’s office, or by asking your pharmacy to contact your provider’s office with a refill request. Medication refills are processed only during regular business hours and may not be available until the next business day. Your provider may request additional information or to have a follow-up visit with you prior to refilling your medication.   *Please Note: Medication refills are assigned a new Rx number when refilled electronically. Your pharmacy may indicate that no refills were authorized even though a new prescription for the same medication is available at the pharmacy. Please request the medicine by name with the pharmacy before contacting your provider for a refill.        Your To Do List     Future Labs/Procedures Complete By [facility]-DASHAWN MULTI LEVEL BILAT  As directed 5/8/2018      Referral     A referral request has been sent to our patient care coordination department. Please allow 3-5 business days for us to process this request and contact you either by phone or mail. If you do not hear from us by the 5th business day, please call us at [phone].        Other Notes About Your Plan     Enrolled in [facility] Team with Neha Jacques Status: Patient Declined

## 2017-05-09 ENCOUNTER — HOSPITAL ENCOUNTER (OUTPATIENT)
Dept: LAB | Facility: MEDICAL CENTER | Age: 82
End: 2017-05-09
Attending: NURSE PRACTITIONER
Payer: MEDICARE

## 2017-05-09 ENCOUNTER — OFFICE VISIT (OUTPATIENT)
Dept: MEDICAL GROUP | Facility: MEDICAL CENTER | Age: 82
End: 2017-05-09
Payer: MEDICARE

## 2017-05-09 ENCOUNTER — TELEPHONE (OUTPATIENT)
Dept: MEDICAL GROUP | Facility: MEDICAL CENTER | Age: 82
End: 2017-05-09

## 2017-05-09 VITALS
DIASTOLIC BLOOD PRESSURE: 70 MMHG | BODY MASS INDEX: 28.86 KG/M2 | TEMPERATURE: 97.5 F | HEIGHT: 60 IN | HEART RATE: 89 BPM | SYSTOLIC BLOOD PRESSURE: 98 MMHG | WEIGHT: 147 LBS | OXYGEN SATURATION: 95 %

## 2017-05-09 DIAGNOSIS — Z01.818 PREOP GENERAL PHYSICAL EXAM: ICD-10-CM

## 2017-05-09 DIAGNOSIS — I42.2 ASYMMETRIC SEPTAL HYPERTROPHY (HCC): ICD-10-CM

## 2017-05-09 DIAGNOSIS — M79.671 FOOT PAIN, BILATERAL: ICD-10-CM

## 2017-05-09 DIAGNOSIS — E78.5 HYPERLIPIDEMIA LDL GOAL <100: ICD-10-CM

## 2017-05-09 DIAGNOSIS — R73.01 IMPAIRED FASTING GLUCOSE: ICD-10-CM

## 2017-05-09 DIAGNOSIS — R41.3 MEMORY LOSS, SHORT TERM: ICD-10-CM

## 2017-05-09 DIAGNOSIS — F01.50 VASCULAR DEMENTIA WITHOUT BEHAVIORAL DISTURBANCE (HCC): ICD-10-CM

## 2017-05-09 DIAGNOSIS — M79.672 FOOT PAIN, BILATERAL: ICD-10-CM

## 2017-05-09 DIAGNOSIS — F32.89 OTHER DEPRESSION: ICD-10-CM

## 2017-05-09 LAB
CHOLEST SERPL-MCNC: 151 MG/DL (ref 100–199)
CREAT UR-MCNC: 183 MG/DL
EST. AVERAGE GLUCOSE BLD GHB EST-MCNC: 137 MG/DL
HBA1C MFR BLD: 6.4 % (ref 0–5.6)
HDLC SERPL-MCNC: 54 MG/DL
LDLC SERPL CALC-MCNC: 72 MG/DL
MICROALBUMIN UR-MCNC: <0.7 MG/DL
MICROALBUMIN/CREAT UR: NORMAL MG/G (ref 0–30)
TRIGL SERPL-MCNC: 125 MG/DL (ref 0–149)

## 2017-05-09 PROCEDURE — 83036 HEMOGLOBIN GLYCOSYLATED A1C: CPT

## 2017-05-09 PROCEDURE — 82043 UR ALBUMIN QUANTITATIVE: CPT

## 2017-05-09 PROCEDURE — 80061 LIPID PANEL: CPT

## 2017-05-09 PROCEDURE — 99214 OFFICE O/P EST MOD 30 MIN: CPT | Performed by: NURSE PRACTITIONER

## 2017-05-09 PROCEDURE — 36415 COLL VENOUS BLD VENIPUNCTURE: CPT

## 2017-05-09 PROCEDURE — 82570 ASSAY OF URINE CREATININE: CPT

## 2017-05-09 RX ORDER — DONEPEZIL HYDROCHLORIDE 10 MG/1
10 TABLET, FILM COATED ORAL DAILY
Qty: 90 TAB | Refills: 1 | Status: SHIPPED | OUTPATIENT
Start: 2017-05-09 | End: 2017-12-11 | Stop reason: SDUPTHER

## 2017-05-09 RX ORDER — ESCITALOPRAM OXALATE 20 MG/1
20 TABLET ORAL DAILY
Qty: 30 TAB | Refills: 3 | Status: SHIPPED | OUTPATIENT
Start: 2017-05-09 | End: 2017-05-17 | Stop reason: SDUPTHER

## 2017-05-09 ASSESSMENT — PATIENT HEALTH QUESTIONNAIRE - PHQ9
5. POOR APPETITE OR OVEREATING: 3 - NEARLY EVERY DAY
SUM OF ALL RESPONSES TO PHQ QUESTIONS 1-9: 24
CLINICAL INTERPRETATION OF PHQ2 SCORE: 6

## 2017-05-09 NOTE — PROGRESS NOTES
Subjective:      Marii Neal is a 81 y.o. female who presents with Annual Exam            Annual Exam      Seen in f/u for preop clearance for THR left.  She had a EKG today that was NSR.  However a echo in 2013 showed asymmetric septal hypertrophy.  No hx of chest pain.  No hx of MI.    She is sched for left THR.  She is also set for DASHAWN d/t leg swelling.    Her last lab in 2016 showed a1c of 6.3.  She is dur repeat.    She is also due updated LP and alb/cr ratio.  She needs refill on voltaren get for her foot pain.  She was started on aricept for her memory.  Not heping on 5 mg daily  She is on cymbalta for her depression.  Also not helping.         Patient Active Problem List    Diagnosis Date Noted   • Lumbar stenosis      Priority: Low   • DDD (degenerative disc disease)      Priority: Low   • Osteoporosis, senile 12/03/2009     Priority: Low   • Hyperlipidemia LDL goal <100 05/09/2017   • Arthritis    • Shingles outbreak 09/03/2015   • Peripheral neuropathy 09/03/2015   • Vitamin d deficiency    • History of pancreatitis    • Allergic rhinitis 04/14/2011   • Metabolic syndrome    • Fatty liver    • Diverticulitis    • Preventative health care 04/15/2010   • Atypical squamous cells of undetermined significance (ASCUS) on Papanicolaou smear of cervix 05/08/2009   • H. pylori infection 03/17/2008     Current Outpatient Prescriptions   Medication Sig Dispense Refill   • duloxetine (CYMBALTA) 60 MG Cap DR Particles delayed-release capsule TAKE ONE CAPSULE BY MOUTH ONCE DAILY 90 Cap 3   • LYRICA 100 MG Cap TAKE ONE CAPSULE BY MOUTH THREE TIMES DAILY 90 Cap 3   • Olopatadine HCl (PATADAY) 0.2 % Solution 1 Drop by Ophthalmic route as needed (For allergies and dry eye).     • Diclofenac Sodium (VOLTAREN) 1 % Gel Apply 1 Application to skin as directed 2 times a day. For pain in legs and hands. 1 Tube 3   • polyethylene glycol 3350 (MIRALAX) Powder Take 17 g by mouth every day. 1581 g 0   • alendronate  (FOSAMAX) 70 MG Tab Take 70 mg by mouth every 7 days. On Sun     • atorvastatin (LIPITOR) 40 MG Tab Take 40 mg by mouth every day.     • Cholecalciferol (VITAMIN D PO) Take 1 Tab by mouth every day.     • Omega-3 Fatty Acids (FISH OIL) 1000 MG Cap capsule Take 2,000 mg by mouth every day.     • CALCIUM PO Take 1 Tab by mouth every day.     • Glucos-Chondroit-Hyaluron-MSM (GLUCOSAMINE CHONDROITIN JOINT PO) Take 1 Tab by mouth every day.     • levothyroxine (SYNTHROID) 25 MCG Tab Take 1 Tab by mouth Every morning on an empty stomach. 90 Tab 3   • donepezil (ARICEPT) 5 MG Tab Take 1 Tab by mouth every evening. 90 Tab 3   • montelukast (SINGULAIR) 10 MG Tab Take 1 Tab by mouth every day. 30 Tab 30     No current facility-administered medications for this visit.     Allergies   Allergen Reactions   • Etodolac Anaphylaxis     dizziness   • Lactose Diarrhea     intolerant       ROS    Review of Systems   Constitutional: Negative.  Negative for fever, chills, weight loss, malaise/fatigue and diaphoresis.   HENT: Negative.  Negative for hearing loss, ear pain, nosebleeds, congestion, sore throat, neck pain, tinnitus and ear discharge.    Eyes: Negative.  Negative for blurred vision, double vision, photophobia, pain, discharge and redness.   Respiratory: Negative.  Negative for cough, hemoptysis, sputum production, shortness of breath, wheezing and stridor.    Cardiovascular: Negative.  Negative for chest pain, palpitations, orthopnea, claudication, leg swelling and PND.   Gastrointestinal: Negative.  Negative for heartburn, nausea, vomiting, abdominal pain, diarrhea, constipation, blood in stool and melena.   Genitourinary: Negative.  Negative for dysuria, urgency, frequency, incontinence, hematuria and flank pain.   Musculoskeletal: Negative.  Negative for myalgias, falls.   Skin: Negative.  Negative for itching and rash.   Neurological: Negative.  Negative for dizziness, tingling, tremors, sensory change, speech change,  focal weakness, seizures, loss of consciousness, weakness and headaches.   Endo/Heme/Allergies: Negative.  Negative for environmental allergies and polydipsia. Does not bruise/bleed easily.   Psychiatric/Behavioral: Negative.  Negative for suicidal ideas, hallucinations, memory loss and substance abuse. The patient is not nervous/anxious and does not have insomnia.  + depression.    All other systems reviewed and are negative.         Objective:     BP 98/70 mmHg  Pulse 89  Temp(Src) 36.4 °C (97.5 °F)  Ht 1.524 m (5')  Wt 66.679 kg (147 lb)  BMI 28.71 kg/m2  SpO2 95%  Breastfeeding? No     Physical Exam          Physical Exam   Vitals reviewed.  Constitutional: oriented to person, place, and time. appears well-developed and well-nourished. No distress.   HENT: Head: Normocephalic and atraumatic. Bilateral tympanic membranes wnl w/o bulging.  Right Ear: External ear normal. Left Ear: External ear normal. Nose: Nose normal.  Mouth/Throat: Oropharynx is clear and moist. No oropharyngeal exudate. bryn tm wnl. Eyes: Conjunctivae and EOM are normal. Pupils are equal, round, and reactive to light. Right eye exhibits no discharge. Left eye exhibits no discharge. No scleral icterus.    Neck: Normal range of motion. Neck supple. No JVD present.   Cardiovascular: Normal rate, regular rhythm, normal heart sounds and intact distal pulses.  Exam reveals no gallop and no friction rub.  No murmur heard.  No carotid bruits   Pulmonary/Chest: Effort normal and breath sounds normal. No stridor. No respiratory distress. no wheezes or rales. exhibits no tenderness.   Abdominal: Soft. Bowel sounds are normal. exhibits no distension and no mass. No tenderness. no rebound and no guarding.   Musculoskeletal: Normal range of motion. exhibits no edema or tenderness.  bryn pedal pulses 2+.  Lymphadenopathy:  no cervical or supraclavicular adenopathy.   Neurological: alert and oriented to person, place, and time. has normal reflexes.  displays normal reflexes. No cranial nerve deficit. exhibits normal muscle tone. Coordination normal.   Skin: Skin is warm and dry. No rash noted. no diaphoresis. No erythema. No pallor.   Psychiatric: normal mood and affect. behavior is normal.        Assessment/Plan:     1. Preop general physical exam  REFERRAL TO CARDIOLOGY    CANCELED: REFERRAL TO CARDIOLOGY    if all testing wnl and cleared by cardiac then cleared for upcoming hip surgery   2. Asymmetric septal hypertrophy (CMS-HCC)  REFERRAL TO CARDIOLOGY    CANCELED: REFERRAL TO CARDIOLOGY    refer ren for eval.  EKG in office NSR WITH borderline sttw chgs.   3. Vascular dementia without behavioral disturbance  donepezil (ARICEPT) 10 MG tablet    inc aricept to 10 mg daily.  f/u 3 months or sooner if lab abn.  do lab before surgery   4. Memory loss, short term  donepezil (ARICEPT) 10 MG tablet   5. Other depression  escitalopram (LEXAPRO) 20 MG tablet    change cymbalta to lexapro   6. Foot pain, bilateral  Diclofenac Sodium (VOLTAREN) 1 % Gel    refilled voltaren   7. Impaired fasting glucose  HEMOGLOBIN A1C    MICROALBUMIN CREAT RATIO URINE    do lab and f/u 3 months for review   8. Hyperlipidemia LDL goal <100  LIPID PROFILE

## 2017-05-09 NOTE — MR AVS SNAPSHOT
Marii Jolly Peter Kirkpatrick   2017 8:45 AM   Office Visit   MRN: [id number]    Department:  [facility]   Dept Phone:  [phone]    Description:  Female : 1935   Provider:  MUKUL Raymond           Reason for Visit     Annual Exam           Allergies as of 2017     Allergen Noted Reactions    Etodolac 2009   Anaphylaxis    dizziness    Lactose 2009   Diarrhea    intolerant      You were diagnosed with     Preop general physical exam   [355793]   if all testing wnl and cleared by cardiac then cleared for upcoming hip surgery    Asymmetric septal hypertrophy (CMS-HCC)   [247678]   refer carduiac for eval.  EKG in office NSR WITH borderline sttw chgs.    Vascular dementia without behavioral disturbance   [922689]   inc aricept to 10 mg daily.  f/u 3 months or sooner if lab abn.  do lab before surgery    Memory loss, short term   [168825]       Other depression   [8376611]   change cymbalta to lexapro    Foot pain, bilateral   [038292]   refilled voltaren    Impaired fasting glucose   [790.21.ICD-9-CM]   do lab and f/u 3 months for review    Hyperlipidemia LDL goal <100   [886009]         Vital Signs     Blood Pressure Pulse Temperature Height Weight Body Mass Index    98/70 mmHg 89 36.4 °C (97.5 °F) 1.524 m (5') 66.679 kg (147 lb) 28.71 kg/m2    Oxygen Saturation Breastfeeding? Smoking Status             95% No Never Smoker          Basic Information     Date Of Birth Sex Race Ethnicity Preferred Language    1935 Female White  Origin (Macedonian,Tanzanian,Ugandan,Akil, etc) English      Your appointments     May 09, 2017  1:45 PM   Scheduled Pre Admission with PREADMIT 4   [facility] (--)    [address] [facility]  [place] [id number] [phone]            2017  3:30 PM   MA SCRN10 with [facility] MG 3   [facility]    [address]  [place] [id number] [phone]           No deodorant, powder,  perfume or lotion under the arm or breast area.            Jul 20, 2017  2:00 PM   New Patient with Carlos Almodovar M.D.   [facility] (--)    [address], [address]  [facility] [id number] [phone]           Please bring Photo ID, Insurance Cards, All Medication Bottles and copies of any legal documents (such as Living Will, Power of ) If speaking a language besides English please bring an adult . Please arrive 30 minutes prior for check in and registration. You will be receiving a confirmation call a few days before your appointment from our automated call confirmation system.            Aug 10, 2017 11:00 AM   Established Patient with MUKUL Raymond   [facility]    [address]  [facility] [id number] [phone]           You will be receiving a confirmation call a few days before your appointment from our automated call confirmation system.              Problem List              ICD-10-CM Priority Class Noted - Resolved    Osteoporosis, senile M81.0 Low  12/3/2009 - Present    Preventative health care Z00.00   4/15/2010 - Present    Diverticulitis K57.92   Unknown - Present    Metabolic syndrome E88.81   Unknown - Present    Fatty liver K76.0   Unknown - Present    DDD (degenerative disc disease) [id number] Low  Unknown - Present    Allergic rhinitis    4/14/2011 - Present    Lumbar stenosis M48.06 Low  Unknown - Present    History of pancreatitis Z87.19   Unknown - Present    Vitamin d deficiency    Unknown - Present    Shingles outbreak B02.9   9/3/2015 - Present    Peripheral neuropathy G62.9   9/3/2015 - Present    Arthritis M19.90   Unknown - Present    Atypical squamous cells of undetermined significance (ASCUS) on Papanicolaou smear of cervix R87.610   5/8/2009 - Present    H. pylori infection A04.8   3/17/2008 - Present    Hyperlipidemia LDL goal <100 E78.5   5/9/2017 - Present      Health Maintenance         Date Due Completion Dates    IMM ZOSTER VACCINE 6/1/1996 ---    IMM DTaP/Tdap/Td Vaccine (1 - Tdap) 2/19/2014 2/18/2014    IMM PNEUMOCOCCAL 65+ (ADULT) LOW/MEDIUM RISK SERIES (2 of 2 - PCV13) 7/16/2016 7/16/2015    MAMMOGRAM 6/22/2017 6/22/2016, 4/1/2015, 3/16/2015, 3/14/2014, 5/14/2012, 11/28/2011, 5/25/2011, 5/13/2011, 5/10/2010, 5/10/2010, 3/31/2009, 3/31/2009, 3/27/2008, 3/27/2008, 12/14/2006, 11/3/2005, 6/22/2004    BONE DENSITY 7/10/2019 7/10/2014, 7/9/2012, 11/25/2009    COLONOSCOPY 11/22/2020 11/22/2010 (Done)    Override on 11/22/2010: Done ([facility])            Current Immunizations     Influenza Vaccine Adult HD 11/9/2016, 10/21/2015  3:53 PM    Pneumococcal polysaccharide vaccine (PPSV-23) 7/16/2015    TD Vaccine 2/18/2014      Below and/or attached are the medications your provider expects you to take. Review all of your home medications and newly ordered medications with your provider and/or pharmacist. Follow medication instructions as directed by your provider and/or pharmacist. Please keep your medication list with you and share with your provider. Update the information when medications are discontinued, doses are changed, or new medications (including over-the-counter products) are added; and carry medication information at all times in the event of emergency situations     Allergies:  ETODOLAC - Anaphylaxis     LACTOSE - Diarrhea               Medications  Valid as of: May 09, 2017 -  9:40 AM    Generic Name Brand Name Tablet Size Instructions for use    Alendronate Sodium (Tab) FOSAMAX 70 MG Take 70 mg by mouth every 7 days. On Sun        Atorvastatin Calcium (Tab) LIPITOR 40 MG Take 40 mg by mouth every day.        Calcium   Take 1 Tab by mouth every day.        Cholecalciferol   Take 1 Tab by mouth every day.        Diclofenac Sodium (Gel) Diclofenac Sodium 1 % Apply 1 Application to skin as directed 2 times a day. For pain in legs and hands.        Donepezil HCl (Tab) ARICEPT 5 MG Take 1 Tab  by mouth every evening.        Donepezil HCl (Tab) ARICEPT 10 MG Take 1 Tab by mouth every day.        DULoxetine HCl (Cap DR Particles) CYMBALTA 60 MG TAKE ONE CAPSULE BY MOUTH ONCE DAILY        Escitalopram Oxalate (Tab) LEXAPRO 20 MG Take 1 Tab by mouth every day.        Glucos-Chondroit-Hyaluron-MSM   Take 1 Tab by mouth every day.        Levothyroxine Sodium (Tab) SYNTHROID 25 MCG Take 1 Tab by mouth Every morning on an empty stomach.        Montelukast Sodium (Tab) SINGULAIR 10 MG Take 1 Tab by mouth every day.        Olopatadine HCl (Solution) Olopatadine HCl 0.2 % 1 Drop by Ophthalmic route as needed (For allergies and dry eye).        Omega-3 Fatty Acids (Cap) fish oil 1000 MG Take 2,000 mg by mouth every day.        Polyethylene Glycol 3350 (Powder) MIRALAX  Take 17 g by mouth every day.        Pregabalin (Cap) LYRICA 100 MG TAKE ONE CAPSULE BY MOUTH THREE TIMES DAILY        .                 Medicines prescribed today were sent to:     [facility] [address] [place], [place] [address] [address] [place], [place] [address] [address] [place] [address]    Phone: [phone] Fax: [phone]    Open 24 Hours?: No      Medication refill instructions:       If your prescription bottle indicates you have medication refills left, it is not necessary to call your provider’s office. Please contact your pharmacy and they will refill your medication.    If your prescription bottle indicates you do not have any refills left, you may request refills at any time through one of the following ways: The online [employer] system (except Urgent Care), by calling your provider’s office, or by asking your pharmacy to contact your provider’s office with a refill request. Medication refills are processed only during regular business hours and may not be available until the next business day. Your provider may request additional information or to have a follow-up visit with you prior to refilling your medication.   *Please Note: Medication refills are assigned a  new Rx number when refilled electronically. Your pharmacy may indicate that no refills were authorized even though a new prescription for the same medication is available at the pharmacy. Please request the medicine by name with the pharmacy before contacting your provider for a refill.        Your To Do List     Future Labs/Procedures Complete By Expires    HEMOGLOBIN A1C  As directed 5/10/2018    LIPID PROFILE  As directed 5/10/2018    MICROALBUMIN CREAT RATIO URINE  As directed 5/10/2018      Referral     A referral request has been sent to our patient care coordination department. Please allow 3-5 business days for us to process this request and contact you either by phone or mail. If you do not hear from us by the 5th business day, please call us at [phone].        Other Notes About Your Plan     Enrolled in R-Med Team with Neha Jacques Status: Patient Declined

## 2017-05-11 ENCOUNTER — TELEPHONE (OUTPATIENT)
Dept: MEDICAL GROUP | Facility: MEDICAL CENTER | Age: 82
End: 2017-05-11

## 2017-05-11 ENCOUNTER — HOSPITAL ENCOUNTER (OUTPATIENT)
Dept: RADIOLOGY | Facility: MEDICAL CENTER | Age: 82
End: 2017-05-11
Attending: NURSE PRACTITIONER
Payer: MEDICARE

## 2017-05-11 DIAGNOSIS — R52 PAIN: ICD-10-CM

## 2017-05-11 DIAGNOSIS — M79.672 BILATERAL FOOT PAIN: ICD-10-CM

## 2017-05-11 DIAGNOSIS — M79.671 BILATERAL FOOT PAIN: ICD-10-CM

## 2017-05-11 PROCEDURE — 93922 UPR/L XTREMITY ART 2 LEVELS: CPT | Mod: 26 | Performed by: SURGERY

## 2017-05-11 PROCEDURE — 93922 UPR/L XTREMITY ART 2 LEVELS: CPT

## 2017-05-12 NOTE — TELEPHONE ENCOUNTER
Please let pt know that the DASHAWN/leg circulation test is wnl for circulation but has some plaque in her legs.  Need to make sure bp and chol stay controlled.

## 2017-05-17 DIAGNOSIS — F32.89 OTHER DEPRESSION: ICD-10-CM

## 2017-05-17 RX ORDER — PREGABALIN 100 MG/1
100 CAPSULE ORAL 3 TIMES DAILY
Qty: 270 CAP | Refills: 3 | Status: SHIPPED | OUTPATIENT
Start: 2017-05-17 | End: 2017-05-18 | Stop reason: SDUPTHER

## 2017-05-17 RX ORDER — ESCITALOPRAM OXALATE 20 MG/1
20 TABLET ORAL DAILY
Qty: 90 TAB | Refills: 3 | Status: SHIPPED | OUTPATIENT
Start: 2017-05-17 | End: 2017-05-18 | Stop reason: SDUPTHER

## 2017-05-18 DIAGNOSIS — F32.89 OTHER DEPRESSION: ICD-10-CM

## 2017-05-18 RX ORDER — ESCITALOPRAM OXALATE 20 MG/1
20 TABLET ORAL DAILY
Qty: 90 TAB | Refills: 3 | Status: SHIPPED | OUTPATIENT
Start: 2017-05-18 | End: 2017-11-09 | Stop reason: SDUPTHER

## 2017-05-18 RX ORDER — PREGABALIN 100 MG/1
100 CAPSULE ORAL 3 TIMES DAILY
Qty: 270 CAP | Refills: 3 | Status: SHIPPED
Start: 2017-05-18 | End: 2018-01-23 | Stop reason: SDUPTHER

## 2017-05-18 NOTE — TELEPHONE ENCOUNTER
Pt's daughter left message requesting rx to go to [facility]. She also states that Alendronate is requiring a PAR.     Call back number: [phone]

## 2017-05-31 ENCOUNTER — TELEPHONE (OUTPATIENT)
Dept: MEDICAL GROUP | Facility: MEDICAL CENTER | Age: 82
End: 2017-05-31

## 2017-05-31 DIAGNOSIS — R91.8 PULMONARY NODULES: ICD-10-CM

## 2017-05-31 NOTE — TELEPHONE ENCOUNTER
Please let pt family know that its time to recheck the ct chest for the bryn pulm nodules from last yr

## 2017-05-31 NOTE — TELEPHONE ENCOUNTER
Called pt, daughter Johanne answered, she said her mother is in [place] now and she is not sure when she is coming back, BUT if she comes back maybe in July she will give us a all to schedule and sadia with you or for you to order the CT chest

## 2017-06-03 RX ORDER — ALENDRONATE SODIUM 70 MG/1
TABLET ORAL
Qty: 12 TAB | Refills: 3 | Status: SHIPPED | OUTPATIENT
Start: 2017-06-03 | End: 2017-12-03

## 2017-06-06 ENCOUNTER — PATIENT OUTREACH (OUTPATIENT)
Dept: HEALTH INFORMATION MANAGEMENT | Facility: OTHER | Age: 82
End: 2017-06-06

## 2017-06-06 NOTE — PROGRESS NOTES
Outcome: Left Message -- Patient's daughter says that Marii is in [place] currently and is unsure when she will be back. She requested that we call back in about 1 month.    Web[employer] Checked & Epic Updated:  yes    [facility] Verified: no    Attempt # 1ST

## 2017-06-21 NOTE — PROGRESS NOTES
Outcome: Left Message    Web[employer] Checked & Epic Updated:  yes    [facility] Verified: no    Attempt # 2nd

## 2017-06-23 NOTE — PROGRESS NOTES
Outcome: Left Message -- Spoke to pt's daughter who said Marii is out of the country and may be back in about 2-3 months. She requested that I call back on her phone and leave a message with our phone number and what appointment we are trying to schedule. She will have Marii call us back when she returns to schedule.    WebIZ Checked & Epic Updated:  yes    [facility] Verified: no    Attempt # 3rd

## 2017-07-20 ENCOUNTER — APPOINTMENT (OUTPATIENT)
Dept: NEUROLOGY | Facility: MEDICAL CENTER | Age: 82
End: 2017-07-20
Payer: MEDICARE

## 2017-08-08 ENCOUNTER — TELEPHONE (OUTPATIENT)
Dept: MEDICAL GROUP | Facility: MEDICAL CENTER | Age: 82
End: 2017-08-08

## 2017-08-08 NOTE — TELEPHONE ENCOUNTER
Please check with pt family and see when they are going to do the ct chest that was ordered in may.

## 2017-08-21 RX ORDER — MONTELUKAST SODIUM 10 MG/1
TABLET ORAL
Qty: 90 TAB | Refills: 3 | Status: SHIPPED | OUTPATIENT
Start: 2017-08-21 | End: 2017-10-19

## 2017-08-21 RX ORDER — OLOPATADINE HYDROCHLORIDE 2 MG/ML
SOLUTION OPHTHALMIC
Qty: 3 BOTTLE | Refills: 3 | Status: SHIPPED | OUTPATIENT
Start: 2017-08-21 | End: 2017-12-03

## 2017-09-14 ENCOUNTER — OFFICE VISIT (OUTPATIENT)
Dept: MEDICAL GROUP | Facility: MEDICAL CENTER | Age: 82
End: 2017-09-14
Payer: MEDICARE

## 2017-09-14 VITALS
HEIGHT: 60 IN | BODY MASS INDEX: 28.07 KG/M2 | HEART RATE: 95 BPM | DIASTOLIC BLOOD PRESSURE: 60 MMHG | SYSTOLIC BLOOD PRESSURE: 108 MMHG | TEMPERATURE: 97.5 F | WEIGHT: 143 LBS | OXYGEN SATURATION: 95 %

## 2017-09-14 DIAGNOSIS — N95.9 POST MENOPAUSAL PROBLEMS: ICD-10-CM

## 2017-09-14 DIAGNOSIS — Z23 NEED FOR INFLUENZA VACCINATION: ICD-10-CM

## 2017-09-14 DIAGNOSIS — R73.01 IFG (IMPAIRED FASTING GLUCOSE): ICD-10-CM

## 2017-09-14 DIAGNOSIS — F02.80 DEMENTIA ASSOCIATED WITH OTHER UNDERLYING DISEASE WITHOUT BEHAVIORAL DISTURBANCE (HCC): ICD-10-CM

## 2017-09-14 PROCEDURE — 99214 OFFICE O/P EST MOD 30 MIN: CPT | Mod: 25 | Performed by: NURSE PRACTITIONER

## 2017-09-14 PROCEDURE — 90662 IIV NO PRSV INCREASED AG IM: CPT | Performed by: NURSE PRACTITIONER

## 2017-09-14 PROCEDURE — G0008 ADMIN INFLUENZA VIRUS VAC: HCPCS | Performed by: NURSE PRACTITIONER

## 2017-09-14 RX ORDER — MEMANTINE HYDROCHLORIDE 5 MG/1
5 TABLET ORAL 2 TIMES DAILY
Qty: 60 TAB | Refills: 1 | Status: SHIPPED | OUTPATIENT
Start: 2017-09-14 | End: 2017-11-20 | Stop reason: SDUPTHER

## 2017-09-14 ASSESSMENT — PATIENT HEALTH QUESTIONNAIRE - PHQ9: CLINICAL INTERPRETATION OF PHQ2 SCORE: 0

## 2017-09-14 NOTE — PROGRESS NOTES
Subjective:     Marii Neal is a 82 y.o. female who presents with No chief complaint on file.  .    HPI:   Seen in f/u for dementia.  Seen with daughter.  She has just returned from [place].  She likes being in [place] better.  Memory is getting worse.  She is on aricept.  Pt is very angry with family members.      Patient Active Problem List    Diagnosis Date Noted   • Lumbar stenosis      Priority: Low   • DDD (degenerative disc disease)      Priority: Low   • Osteoporosis, senile 12/03/2009     Priority: Low   • Hyperlipidemia LDL goal <100 05/09/2017   • Arthritis    • Shingles outbreak 09/03/2015   • Peripheral neuropathy 09/03/2015   • Vitamin d deficiency    • History of pancreatitis    • Allergic rhinitis 04/14/2011   • Metabolic syndrome    • Fatty liver    • Diverticulitis    • Preventative health care 04/15/2010   • Atypical squamous cells of undetermined significance (ASCUS) on Papanicolaou smear of cervix 05/08/2009   • H. pylori infection 03/17/2008       Current medicines (including changes today)  Current Outpatient Prescriptions   Medication Sig Dispense Refill   • Linaclotide (LINZESS) 145 MCG Cap Take  by mouth.     • memantine (NAMENDA) 5 MG Tab Take 1 Tab by mouth 2 times a day. 60 Tab 1   • montelukast (SINGULAIR) 10 MG Tab TAKE ONE TABLET BY MOUTH ONCE DAILY 90 Tab 3   • levothyroxine (SYNTHROID) 25 MCG Tab Take 1 Tab by mouth Every morning on an empty stomach. 90 Tab 3   • Diclofenac Sodium 1 % Gel APPLY   TOPICALLY TWICE DAILY AS DIRECTED FOR  PAIN  IN  LEGS  AND  HANDS 1 Tube 3   • PATADAY 0.2 % Solution INSTILL ONE DROP INTO EACH EYE ONCE DAILY 3 Bottle 3   • polyethylene glycol 3350 (MIRALAX) Powder Take 17 g by mouth every day. Take 17 g with 8 ounces of liquid, mix well and drink daily 1581 g 1   • alendronate (FOSAMAX) 70 MG Tab TAKE ONE TABLET BY MOUTH ONCE A WEEK 12 Tab 3   • escitalopram (LEXAPRO) 20 MG tablet Take 1 Tab by mouth every day. 90 Tab 3   • pregabalin  (LYRICA) 100 MG Cap Take 1 Cap by mouth 3 times a day. 270 Cap 3   • donepezil (ARICEPT) 10 MG tablet Take 1 Tab by mouth every day. 90 Tab 1   • Olopatadine HCl (PATADAY) 0.2 % Solution 1 Drop by Ophthalmic route as needed (For allergies and dry eye).     • atorvastatin (LIPITOR) 40 MG Tab Take 40 mg by mouth every day.     • Cholecalciferol (VITAMIN D PO) Take 1 Tab by mouth every day.     • Omega-3 Fatty Acids (FISH OIL) 1000 MG Cap capsule Take 2,000 mg by mouth every day.     • CALCIUM PO Take 1 Tab by mouth every day.     • Glucos-Chondroit-Hyaluron-MSM (GLUCOSAMINE CHONDROITIN JOINT PO) Take 1 Tab by mouth every day.     • montelukast (SINGULAIR) 10 MG Tab Take 1 Tab by mouth every day. 30 Tab 30     No current facility-administered medications for this visit.        Allergies   Allergen Reactions   • Etodolac Anaphylaxis     dizziness   • Lactose Diarrhea     intolerant       ROS  Constitutional: Negative. Negative for fever, chills, weight loss, malaise/fatigue and diaphoresis.   HENT: Negative. Negative for hearing loss, ear pain, nosebleeds, congestion, sore throat, neck pain, tinnitus and ear discharge.   Respiratory: Negative. Negative for cough, hemoptysis, sputum production, shortness of breath, wheezing and stridor.   Cardiovascular: Negative. Negative for chest pain, palpitations, orthopnea, claudication, leg swelling and PND.   Gastrointestinal: Denies nausea, vomiting, diarrhea, constipation, heartburn, melena or hematochezia.  Genitourinary: Denies dysuria, hematuria, urinary incontinence, frequency or urgency.        Objective:     Blood pressure 108/60, pulse 95, temperature 36.4 °C (97.5 °F), height 1.524 m (5'), weight 64.9 kg (143 lb), SpO2 95 %, not currently breastfeeding. Body mass index is 27.93 kg/m².    Physical Exam:  Vitals reviewed.  Constitutional: Oriented to person, place, and time. appears well-developed and well-nourished. No distress.   Cardiovascular: Normal rate, regular  rhythm, normal heart sounds and intact distal pulses. Exam reveals no gallop and no friction rub. No murmur heard. No carotid bruits.   Pulmonary/Chest: Effort normal and breath sounds normal. No stridor. No respiratory distress. no wheezes or rales. exhibits no tenderness.   Musculoskeletal: Normal range of motion. exhibits no edema. bryn pedal pulses 2+.  Lymphadenopathy: No cervical or supraclavicular adenopathy.   Neurological: Alert and oriented to person, place, and time. exhibits normal muscle tone.  Skin: Skin is warm and dry. No diaphoresis.   Psychiatric: Normal mood and affect. Behavior is normal.      Assessment and Plan:     The following treatment plan was discussed:    1. Dementia associated with other underlying disease without behavioral disturbance  REFERRAL TO GERIATRICS    memantine (NAMENDA) 5 MG Tab    getting worse with memory and anger.  add namenda.  refer geriatrics   2. IFG (impaired fasting glucose)  HEMOGLOBIN A1C    do a1c in nov. f/u for review lab   3. Need for influenza vaccination  INFLUENZA VACCINE, HIGH DOSE (65+ ONLY)   4. Post menopausal problems  DS-BONE DENSITY STUDY (DEXA)    dexa scan           Followup: Return in about 2 months (around 11/14/2017).

## 2017-10-05 ENCOUNTER — TELEPHONE (OUTPATIENT)
Dept: MEDICAL GROUP | Facility: MEDICAL CENTER | Age: 82
End: 2017-10-05

## 2017-10-05 DIAGNOSIS — R41.3 MEMORY LOSS: ICD-10-CM

## 2017-10-05 NOTE — TELEPHONE ENCOUNTER
[facility] called requesting a referral to be placed for pt to get geriatric [phone]    They want recent notes ,demo,labs, head imaging

## 2017-10-10 ENCOUNTER — APPOINTMENT (OUTPATIENT)
Dept: RADIOLOGY | Facility: MEDICAL CENTER | Age: 82
End: 2017-10-10
Attending: NURSE PRACTITIONER
Payer: MEDICARE

## 2017-10-19 ENCOUNTER — HOSPITAL ENCOUNTER (OUTPATIENT)
Facility: MEDICAL CENTER | Age: 82
End: 2017-10-19
Attending: NURSE PRACTITIONER
Payer: MEDICARE

## 2017-10-19 ENCOUNTER — OFFICE VISIT (OUTPATIENT)
Dept: MEDICAL GROUP | Facility: PHYSICIAN GROUP | Age: 82
End: 2017-10-19
Payer: MEDICARE

## 2017-10-19 VITALS
BODY MASS INDEX: 28.07 KG/M2 | RESPIRATION RATE: 16 BRPM | WEIGHT: 143 LBS | DIASTOLIC BLOOD PRESSURE: 62 MMHG | HEART RATE: 81 BPM | SYSTOLIC BLOOD PRESSURE: 108 MMHG | TEMPERATURE: 96.9 F | HEIGHT: 60 IN | OXYGEN SATURATION: 94 %

## 2017-10-19 DIAGNOSIS — K60.0 ACUTE ANTERIOR ANAL FISSURE: ICD-10-CM

## 2017-10-19 DIAGNOSIS — R30.0 DYSURIA: ICD-10-CM

## 2017-10-19 LAB
APPEARANCE UR: ABNORMAL
BILIRUB UR STRIP-MCNC: ABNORMAL MG/DL
COLOR UR AUTO: ABNORMAL
GLUCOSE UR STRIP.AUTO-MCNC: ABNORMAL MG/DL
KETONES UR STRIP.AUTO-MCNC: ABNORMAL MG/DL
LEUKOCYTE ESTERASE UR QL STRIP.AUTO: ABNORMAL
NITRITE UR QL STRIP.AUTO: ABNORMAL
PH UR STRIP.AUTO: 5 [PH] (ref 5–8)
PROT UR QL STRIP: ABNORMAL MG/DL
RBC UR QL AUTO: ABNORMAL
SP GR UR STRIP.AUTO: 1.03
UROBILINOGEN UR STRIP-MCNC: 0.2 MG/DL

## 2017-10-19 PROCEDURE — 81002 URINALYSIS NONAUTO W/O SCOPE: CPT | Performed by: NURSE PRACTITIONER

## 2017-10-19 PROCEDURE — 99213 OFFICE O/P EST LOW 20 MIN: CPT | Performed by: NURSE PRACTITIONER

## 2017-10-19 PROCEDURE — 87086 URINE CULTURE/COLONY COUNT: CPT

## 2017-10-19 RX ORDER — DOCUSATE SODIUM 100 MG/1
100 CAPSULE, LIQUID FILLED ORAL 2 TIMES DAILY PRN
Qty: 60 CAP | Refills: 0 | Status: SHIPPED | OUTPATIENT
Start: 2017-10-19 | End: 2017-12-03

## 2017-10-19 ASSESSMENT — PATIENT HEALTH QUESTIONNAIRE - PHQ9: CLINICAL INTERPRETATION OF PHQ2 SCORE: 0

## 2017-10-19 NOTE — PROGRESS NOTES
Subjective:     Chief Complaint   Patient presents with   • Rash     painful rash in genital area       HPI  Marii Neal is a 82 y.o. female here today for perineal irritation. Started one week ago. States there is burning with urination, and burning and itching in rear. It is constant. She has chronic constipation managed with Linzess. Denies any change in urinary frequency, urgency, and denies hematuria. No diarrhea or blood in stool. States bowel movement causes burning. No F/C    Diagnoses of Acute anterior anal fissure and Dysuria were pertinent to this visit.    Allergies: Etodolac and Lactose  Current medicines (including changes today)  Current Outpatient Prescriptions   Medication Sig Dispense Refill   • NON SPECIFIED Nitroglycerin topical 0.2%. Apply twice daily to anal fissure for up to 6 weeks. Monitor for dizziness/headache 1 Each 0   • docusate sodium (COLACE) 100 MG Cap Take 1 Cap by mouth 2 times a day as needed for Constipation. 60 Cap 0   • psyllium (METAMUCIL SMOOTH TEXTURE) 28 % packet Take 1 Packet by mouth every day. 30 Each 0   • Linaclotide (LINZESS) 145 MCG Cap Take  by mouth.     • memantine (NAMENDA) 5 MG Tab Take 1 Tab by mouth 2 times a day. 60 Tab 1   • Diclofenac Sodium 1 % Gel APPLY   TOPICALLY TWICE DAILY AS DIRECTED FOR  PAIN  IN  LEGS  AND  HANDS 1 Tube 3   • PATADAY 0.2 % Solution INSTILL ONE DROP INTO EACH EYE ONCE DAILY 3 Bottle 3   • polyethylene glycol 3350 (MIRALAX) Powder Take 17 g by mouth every day. Take 17 g with 8 ounces of liquid, mix well and drink daily 1581 g 1   • escitalopram (LEXAPRO) 20 MG tablet Take 1 Tab by mouth every day. 90 Tab 3   • pregabalin (LYRICA) 100 MG Cap Take 1 Cap by mouth 3 times a day. 270 Cap 3   • donepezil (ARICEPT) 10 MG tablet Take 1 Tab by mouth every day. 90 Tab 1   • Olopatadine HCl (PATADAY) 0.2 % Solution 1 Drop by Ophthalmic route as needed (For allergies and dry eye).     • atorvastatin (LIPITOR) 40 MG Tab Take 40 mg  by mouth every day.     • Cholecalciferol (VITAMIN D PO) Take 1 Tab by mouth every day.     • Omega-3 Fatty Acids (FISH OIL) 1000 MG Cap capsule Take 2,000 mg by mouth every day.     • CALCIUM PO Take 1 Tab by mouth every day.     • Glucos-Chondroit-Hyaluron-MSM (GLUCOSAMINE CHONDROITIN JOINT PO) Take 1 Tab by mouth every day.     • levothyroxine (SYNTHROID) 25 MCG Tab Take 1 Tab by mouth Every morning on an empty stomach. 90 Tab 3   • montelukast (SINGULAIR) 10 MG Tab Take 1 Tab by mouth every day. 30 Tab 30   • alendronate (FOSAMAX) 70 MG Tab TAKE ONE TABLET BY MOUTH ONCE A WEEK 12 Tab 3     No current facility-administered medications for this visit.        She  has a past medical history of Allergic rhinitis; Arthritis; ASCUS on Pap smear (5/8/09); DDD (degenerative disc disease); Diverticulitis; Fatty liver; H. pylori infection (3/17/08); Hyperlipidemia LDL goal < 100; Lumbar stenosis; Metabolic syndrome; Osteoporosis; Pancreatitis (2002); Peripheral neuropathy (CMS-HCC); Shingles; and Vitamin d deficiency. She also has no past medical history of Breast cancer (CMS-HCC) or Encounter for long-term (current) use of other medications.        ROS  As stated in HPI      Objective:     Blood pressure 108/62, pulse 81, temperature 36.1 °C (96.9 °F), resp. rate 16, height 1.524 m (5'), weight 64.9 kg (143 lb), SpO2 94 %. Body mass index is 27.93 kg/m².  Physical Exam:  General: Alert, oriented, in no acute distress.  Eye contact is good, speech goal directed, affect calm  CNs grossly intact.  Gross hearing intact.  Nasal turbinates without edema or drainage.   Pelvic Exam -  Normal external genitalia with no lesions. Pale and fragile vaginal mucosa with decreased rugation and no discharge.   Rectal: anterior anus with fissure   Ext: no edema, normal color and temperature.   Skin: No rashes or lesions in visible areas  Gait steady.     Assessment and Plan:   Assessment/Plan:  1. Acute anterior anal fissure  New problem.  Enc sitz baths TID 15 minutes each time, soften stool with metamucil and docusate, and apply nitroglycerin. Need lower dose d/t risk of hypotension. Educated patient and family to monitor for dizziness/headache   - NON SPECIFIED; Nitroglycerin topical 0.2%. Apply twice daily to anal fissure for up to 6 weeks. Monitor for dizziness/headache  Dispense: 1 Each; Refill: 0  - docusate sodium (COLACE) 100 MG Cap; Take 1 Cap by mouth 2 times a day as needed for Constipation.  Dispense: 60 Cap; Refill: 0  - psyllium (METAMUCIL SMOOTH TEXTURE) 28 % packet; Take 1 Packet by mouth every day.  Dispense: 30 Each; Refill: 0  - POCT Urinalysis    2. Dysuria  Urine abnormal. Not positive she is c/o UTI sx d/t #1 diagnosis, therefore will culture to see if antibiotic necessary   - POCT Urinalysis  - URINE CULTURE(NEW); Future       Follow up:  Return in about 3 weeks (around 11/9/2017), or w/Neha Barr or myself.    Educated in proper administration of medication(s) ordered today including safety, possible SE, risks, benefits, rationale and alternatives to therapy.   Supportive care, differential diagnoses, and indications for immediate follow-up discussed with patient.    Pathogenesis of diagnosis discussed including typical length and natural progression.    Instructed to return to clinic or nearest emergency department for any change in condition, further concerns, or worsening of symptoms.  Patient states understanding of the plan of care and discharge instructions.      Please note that this dictation was created using voice recognition software. I have made every reasonable attempt to correct obvious errors, but I expect that there are errors of grammar and possibly content that I did not discover before finalizing the note.    Followup: Return in about 3 weeks (around 11/9/2017), or w/Neha Barr or myself. sooner should new symptoms or problems arise.

## 2017-10-20 ENCOUNTER — TELEPHONE (OUTPATIENT)
Dept: MEDICAL GROUP | Facility: PHYSICIAN GROUP | Age: 82
End: 2017-10-20

## 2017-10-20 DIAGNOSIS — R30.0 DYSURIA: ICD-10-CM

## 2017-10-20 DIAGNOSIS — K60.0 ACUTE ANTERIOR ANAL FISSURE: ICD-10-CM

## 2017-10-21 NOTE — TELEPHONE ENCOUNTER
The pharmacy doesn't carry the cream at all that was called in for her yesterday.  Can you call in something different??

## 2017-10-22 LAB
BACTERIA UR CULT: NORMAL
SIGNIFICANT IND 70042: NORMAL
SITE SITE: NORMAL
SOURCE SOURCE: NORMAL

## 2017-10-23 ENCOUNTER — TELEPHONE (OUTPATIENT)
Dept: MEDICAL GROUP | Facility: PHYSICIAN GROUP | Age: 82
End: 2017-10-23

## 2017-10-23 NOTE — TELEPHONE ENCOUNTER
Pt calling again about the anal cream the pharmacist states they do not have the Nitroglycerin topical 0.2% cream. Please advise

## 2017-10-23 NOTE — TELEPHONE ENCOUNTER
----- Message from JOSE Schreiber sent at 10/22/2017  9:44 PM PDT -----  Please advise patient that urine was negative for infection.  JOSE Schreiber

## 2017-10-23 NOTE — TELEPHONE ENCOUNTER
Would they be willing to drive to [facility] [place] for this? I believe they can make it there    FEI Schreiber.

## 2017-10-24 NOTE — TELEPHONE ENCOUNTER
I called to verify with [facility] ([facility]) that they can make this.  Can you print out a new script?  Can't send it electronic because their escripts is down.

## 2017-10-30 DIAGNOSIS — E03.8 OTHER SPECIFIED HYPOTHYROIDISM: ICD-10-CM

## 2017-10-31 RX ORDER — LEVOTHYROXINE SODIUM 0.03 MG/1
TABLET ORAL
Qty: 30 TAB | Refills: 0 | Status: SHIPPED | OUTPATIENT
Start: 2017-10-31 | End: 2017-12-06 | Stop reason: SDUPTHER

## 2017-11-02 ENCOUNTER — TELEPHONE (OUTPATIENT)
Dept: MEDICAL GROUP | Facility: MEDICAL CENTER | Age: 82
End: 2017-11-02

## 2017-11-02 ENCOUNTER — HOSPITAL ENCOUNTER (OUTPATIENT)
Dept: RADIOLOGY | Facility: MEDICAL CENTER | Age: 82
End: 2017-11-02
Attending: NURSE PRACTITIONER
Payer: MEDICARE

## 2017-11-02 DIAGNOSIS — Z12.31 ENCOUNTER FOR SCREENING MAMMOGRAM FOR BREAST CANCER: ICD-10-CM

## 2017-11-02 DIAGNOSIS — N95.9 POST MENOPAUSAL PROBLEMS: ICD-10-CM

## 2017-11-02 PROCEDURE — G0202 SCR MAMMO BI INCL CAD: HCPCS

## 2017-11-02 PROCEDURE — 77080 DXA BONE DENSITY AXIAL: CPT

## 2017-11-02 NOTE — TELEPHONE ENCOUNTER
Please let pt know that the dexa scan shows osteoporosis.  Would like to see pt to discuss poss tx with med.

## 2017-11-09 DIAGNOSIS — M79.672 FOOT PAIN, BILATERAL: ICD-10-CM

## 2017-11-09 DIAGNOSIS — M79.671 FOOT PAIN, BILATERAL: ICD-10-CM

## 2017-11-09 DIAGNOSIS — F32.89 OTHER DEPRESSION: ICD-10-CM

## 2017-11-09 RX ORDER — ESCITALOPRAM OXALATE 20 MG/1
20 TABLET ORAL DAILY
Qty: 90 TAB | Refills: 3 | Status: SHIPPED | OUTPATIENT
Start: 2017-11-09 | End: 2018-01-24

## 2017-11-09 RX ORDER — ATORVASTATIN CALCIUM 40 MG/1
TABLET, FILM COATED ORAL
Qty: 90 TAB | Refills: 3 | Status: SHIPPED | OUTPATIENT
Start: 2017-11-09 | End: 2018-12-17 | Stop reason: SDUPTHER

## 2017-11-10 ENCOUNTER — OFFICE VISIT (OUTPATIENT)
Dept: MEDICAL GROUP | Facility: PHYSICIAN GROUP | Age: 82
End: 2017-11-10
Payer: MEDICARE

## 2017-11-10 VITALS
HEART RATE: 69 BPM | TEMPERATURE: 97.7 F | OXYGEN SATURATION: 93 % | RESPIRATION RATE: 16 BRPM | SYSTOLIC BLOOD PRESSURE: 102 MMHG | BODY MASS INDEX: 28.47 KG/M2 | WEIGHT: 145 LBS | DIASTOLIC BLOOD PRESSURE: 58 MMHG | HEIGHT: 60 IN

## 2017-11-10 DIAGNOSIS — K60.0 ACUTE ANTERIOR ANAL FISSURE: ICD-10-CM

## 2017-11-10 PROCEDURE — 99213 OFFICE O/P EST LOW 20 MIN: CPT | Performed by: NURSE PRACTITIONER

## 2017-11-10 RX ORDER — NITROGLYCERIN 20 MG/G
0.5 OINTMENT TOPICAL 2 TIMES DAILY
Qty: 1 TUBE | Refills: 0 | Status: SHIPPED | OUTPATIENT
Start: 2017-11-10 | End: 2017-11-29 | Stop reason: SDUPTHER

## 2017-11-10 RX ORDER — NITROGLYCERIN 20 MG/G
OINTMENT TOPICAL
COMMUNITY
Start: 2017-10-25 | End: 2017-11-10 | Stop reason: SDUPTHER

## 2017-11-10 ASSESSMENT — PATIENT HEALTH QUESTIONNAIRE - PHQ9: CLINICAL INTERPRETATION OF PHQ2 SCORE: 0

## 2017-11-11 NOTE — PROGRESS NOTES
Subjective:     Chief Complaint   Patient presents with   • Follow-Up     anal fissure       HPI  Marii Neal is a 82 y.o. female here today for f/u on anal fissure. 3 weeks ago patient presented with rectal pain. She was also having constipation. I found anterior anal fissure. Gave patient medication for both constipation and nitroglycerin topical for anal fissure. Family reports that she has been having loose stool and is no longer complaining of painful bowel movements. Patient reports that she has been applying the nitroglycerin ointment to the vaginal region instead of the rectal region. Denies any blood in stool.     The encounter diagnosis was Acute anterior anal fissure.    Allergies: Etodolac and Lactose  Current medicines (including changes today)  Current Outpatient Prescriptions   Medication Sig Dispense Refill   • NITRO-BID 2 % Ointment Apply 0.5 Inches to skin as directed 2 Times a Day. 1 Tube 0   • atorvastatin (LIPITOR) 40 MG Tab TAKE ONE TABLET BY MOUTH ONCE DAILY 90 Tab 3   • escitalopram (LEXAPRO) 20 MG tablet Take 1 Tab by mouth every day. 90 Tab 3   • Diclofenac Sodium 1 % Gel Apply 1 Application to affected area(s) 2 Times a Day. 1 Tube 3   • levothyroxine (SYNTHROID) 25 MCG Tab TAKE ONE TABLET BY MOUTH IN THE MORNING ON AN EMPTY STOMACH 30 Tab 0   • docusate sodium (COLACE) 100 MG Cap Take 1 Cap by mouth 2 times a day as needed for Constipation. 60 Cap 0   • psyllium (METAMUCIL SMOOTH TEXTURE) 28 % packet Take 1 Packet by mouth every day. 30 Each 0   • Linaclotide (LINZESS) 145 MCG Cap Take  by mouth.     • memantine (NAMENDA) 5 MG Tab Take 1 Tab by mouth 2 times a day. 60 Tab 1   • PATADAY 0.2 % Solution INSTILL ONE DROP INTO EACH EYE ONCE DAILY 3 Bottle 3   • polyethylene glycol 3350 (MIRALAX) Powder Take 17 g by mouth every day. Take 17 g with 8 ounces of liquid, mix well and drink daily 1581 g 1   • alendronate (FOSAMAX) 70 MG Tab TAKE ONE TABLET BY MOUTH ONCE A WEEK 12 Tab 3    • pregabalin (LYRICA) 100 MG Cap Take 1 Cap by mouth 3 times a day. 270 Cap 3   • donepezil (ARICEPT) 10 MG tablet Take 1 Tab by mouth every day. 90 Tab 1   • Olopatadine HCl (PATADAY) 0.2 % Solution 1 Drop by Ophthalmic route as needed (For allergies and dry eye).     • Cholecalciferol (VITAMIN D PO) Take 1 Tab by mouth every day.     • Omega-3 Fatty Acids (FISH OIL) 1000 MG Cap capsule Take 2,000 mg by mouth every day.     • CALCIUM PO Take 1 Tab by mouth every day.     • Glucos-Chondroit-Hyaluron-MSM (GLUCOSAMINE CHONDROITIN JOINT PO) Take 1 Tab by mouth every day.     • montelukast (SINGULAIR) 10 MG Tab Take 1 Tab by mouth every day. 30 Tab 30   • NON SPECIFIED Nitroglycerin topical 0.2%. Apply twice daily to anal fissure for up to 6 weeks. Monitor for dizziness/headache 1 Each 0     No current facility-administered medications for this visit.        She  has a past medical history of Allergic rhinitis; Arthritis; ASCUS on Pap smear (5/8/09); DDD (degenerative disc disease); Diverticulitis; Fatty liver; H. pylori infection (3/17/08); Hyperlipidemia LDL goal < 100; Lumbar stenosis; Metabolic syndrome; Osteoporosis; Pancreatitis (2002); Peripheral neuropathy (CMS-HCC); Shingles; and Vitamin d deficiency. She also has no past medical history of Breast cancer (CMS-HCC) or Encounter for long-term (current) use of other medications.      ROS  As stated in HPI      Objective:     Blood pressure 102/58, pulse 69, temperature 36.5 °C (97.7 °F), resp. rate 16, height 1.524 m (5'), weight 65.8 kg (145 lb), SpO2 93 %. Body mass index is 28.32 kg/m².  Physical Exam:  General: Alert, oriented, in no acute distress.  Eye contact is good, speech goal directed, affect calm  CNs grossly intact.  Gross hearing intact.  Rectum: anal fissure present anteriorly   Gait steady.     Assessment and Plan:   Assessment/Plan:  1. Acute anterior anal fissure  Not improving. Educated patient and family on site that nitroglycerin needs to be  applied. Again reminded him to monitor for any hypotension. I have chosen the 2% dose rather than 4% due to risk of hypotension. Encouraged him to keep bowels soft to prevent any worsening of fissure. Continue sitz baths.   Rx sent for nitroglycerin 2% topical to apply twice daily for another 3 weeks.        Follow up:  Return when back from [place] .    Educated in proper administration of medication(s) ordered today including safety, possible SE, risks, benefits, rationale and alternatives to therapy.   Supportive care, differential diagnoses, and indications for immediate follow-up discussed with patient.    Pathogenesis of diagnosis discussed including typical length and natural progression.    Instructed to return to clinic or nearest emergency department for any change in condition, further concerns, or worsening of symptoms.  Patient states understanding of the plan of care and discharge instructions.      Please note that this dictation was created using voice recognition software. I have made every reasonable attempt to correct obvious errors, but I expect that there are errors of grammar and possibly content that I did not discover before finalizing the note.    Followup: Return when back from [place] . sooner should new symptoms or problems arise.

## 2017-11-20 DIAGNOSIS — F02.80 DEMENTIA ASSOCIATED WITH OTHER UNDERLYING DISEASE WITHOUT BEHAVIORAL DISTURBANCE (HCC): ICD-10-CM

## 2017-11-20 RX ORDER — MEMANTINE HYDROCHLORIDE 5 MG/1
TABLET ORAL
Qty: 180 TAB | Refills: 3 | Status: SHIPPED | OUTPATIENT
Start: 2017-11-20 | End: 2019-06-10

## 2017-11-20 NOTE — TELEPHONE ENCOUNTER
Was the patient seen in the last year in this department? Yes     Does patient have an active prescription for medications requested? No     Received Request Via: Pharmacy     Last seen: 11/10/2017

## 2017-11-29 ENCOUNTER — HOSPITAL ENCOUNTER (OUTPATIENT)
Facility: MEDICAL CENTER | Age: 82
End: 2017-11-29
Attending: NURSE PRACTITIONER
Payer: MEDICARE

## 2017-11-29 ENCOUNTER — OFFICE VISIT (OUTPATIENT)
Dept: MEDICAL GROUP | Facility: MEDICAL CENTER | Age: 82
End: 2017-11-29
Payer: MEDICARE

## 2017-11-29 VITALS
OXYGEN SATURATION: 98 % | HEIGHT: 60 IN | DIASTOLIC BLOOD PRESSURE: 82 MMHG | WEIGHT: 148 LBS | SYSTOLIC BLOOD PRESSURE: 124 MMHG | TEMPERATURE: 98.7 F | BODY MASS INDEX: 29.06 KG/M2 | HEART RATE: 98 BPM | RESPIRATION RATE: 16 BRPM

## 2017-11-29 DIAGNOSIS — R73.01 IFG (IMPAIRED FASTING GLUCOSE): ICD-10-CM

## 2017-11-29 DIAGNOSIS — N30.01 ACUTE CYSTITIS WITH HEMATURIA: ICD-10-CM

## 2017-11-29 DIAGNOSIS — K60.2 RECTAL FISSURE: ICD-10-CM

## 2017-11-29 LAB
APPEARANCE UR: CLEAR
APPEARANCE UR: NORMAL
BILIRUB UR QL STRIP.AUTO: NEGATIVE
BILIRUB UR STRIP-MCNC: NORMAL MG/DL
COLOR UR AUTO: YELLOW
COLOR UR: NORMAL
CULTURE IF INDICATED INDCX: NO UA CULTURE
GLUCOSE UR STRIP.AUTO-MCNC: NEGATIVE MG/DL
GLUCOSE UR STRIP.AUTO-MCNC: NORMAL MG/DL
KETONES UR STRIP.AUTO-MCNC: NEGATIVE MG/DL
KETONES UR STRIP.AUTO-MCNC: NORMAL MG/DL
LEUKOCYTE ESTERASE UR QL STRIP.AUTO: NEGATIVE
LEUKOCYTE ESTERASE UR QL STRIP.AUTO: NORMAL
MICRO URNS: NORMAL
NITRITE UR QL STRIP.AUTO: NEGATIVE
NITRITE UR QL STRIP.AUTO: NORMAL
PH UR STRIP.AUTO: 5 [PH] (ref 5–8)
PH UR STRIP.AUTO: 6.5 [PH]
PROT UR QL STRIP: NEGATIVE MG/DL
PROT UR QL STRIP: NORMAL MG/DL
RBC UR QL AUTO: NEGATIVE
RBC UR QL AUTO: NORMAL
SP GR UR STRIP.AUTO: 1.02
SP GR UR STRIP.AUTO: 1.02
UROBILINOGEN UR STRIP-MCNC: NORMAL MG/DL

## 2017-11-29 PROCEDURE — 81002 URINALYSIS NONAUTO W/O SCOPE: CPT | Performed by: NURSE PRACTITIONER

## 2017-11-29 PROCEDURE — 81003 URINALYSIS AUTO W/O SCOPE: CPT

## 2017-11-29 PROCEDURE — 99214 OFFICE O/P EST MOD 30 MIN: CPT | Performed by: NURSE PRACTITIONER

## 2017-11-29 RX ORDER — NITROGLYCERIN 20 MG/G
0.5 OINTMENT TOPICAL 2 TIMES DAILY
Qty: 1 TUBE | Refills: 0 | Status: SHIPPED | OUTPATIENT
Start: 2017-11-29 | End: 2018-05-21

## 2017-11-29 RX ORDER — SULFAMETHOXAZOLE AND TRIMETHOPRIM 800; 160 MG/1; MG/1
1 TABLET ORAL 2 TIMES DAILY
Qty: 14 TAB | Refills: 0 | Status: SHIPPED | OUTPATIENT
Start: 2017-11-29 | End: 2017-12-03

## 2017-11-29 NOTE — PROGRESS NOTES
Subjective:     Marii Neal is a 82 y.o. female who presents with No chief complaint on file.  .    HPI:   Seen in fu/ for poss UTI.  She started having urinary freq last nite. Was up all nite with frequent urination.    She is having a lot of stress.  Her  is in hosp and very ill.    Pt continues to have memory loss wiht dementia.    She was seen twice recently in UC for rectal fissure.  She was given ntg cream.  Initially it didn't help since she was using it on vaganal area.  Now went back to UC and they exp how to use the med properly.  Still having sx of rectal pain that is occurring all the time.    No dysuria.  Mild lower abd pain.  No fever, chills or sweating.     Patient Active Problem List    Diagnosis Date Noted   • Lumbar stenosis      Priority: Low   • DDD (degenerative disc disease)      Priority: Low   • Osteoporosis, senile 12/03/2009     Priority: Low   • Hyperlipidemia LDL goal <100 05/09/2017   • Arthritis    • Shingles outbreak 09/03/2015   • Peripheral neuropathy (CMS-HCC) 09/03/2015   • Vitamin d deficiency    • History of pancreatitis    • Allergic rhinitis 04/14/2011   • Metabolic syndrome    • Fatty liver    • Diverticulitis    • Preventative health care 04/15/2010   • Atypical squamous cells of undetermined significance (ASCUS) on Papanicolaou smear of cervix 05/08/2009   • H. pylori infection 03/17/2008       Current medicines (including changes today)  Current Outpatient Prescriptions   Medication Sig Dispense Refill   • sulfamethoxazole-trimethoprim (BACTRIM DS) 800-160 MG tablet Take 1 Tab by mouth 2 times a day. 14 Tab 0   • NITRO-BID 2 % Ointment Apply 0.5 Inches to skin as directed 2 Times a Day. 1 Tube 0   • memantine (NAMENDA) 5 MG Tab TAKE ONE TABLET BY MOUTH TWICE DAILY 180 Tab 3   • atorvastatin (LIPITOR) 40 MG Tab TAKE ONE TABLET BY MOUTH ONCE DAILY 90 Tab 3   • escitalopram (LEXAPRO) 20 MG tablet Take 1 Tab by mouth every day. 90 Tab 3   • Diclofenac  Sodium 1 % Gel Apply 1 Application to affected area(s) 2 Times a Day. 1 Tube 3   • levothyroxine (SYNTHROID) 25 MCG Tab TAKE ONE TABLET BY MOUTH IN THE MORNING ON AN EMPTY STOMACH 30 Tab 0   • NON SPECIFIED Nitroglycerin topical 0.2%. Apply twice daily to anal fissure for up to 6 weeks. Monitor for dizziness/headache 1 Each 0   • docusate sodium (COLACE) 100 MG Cap Take 1 Cap by mouth 2 times a day as needed for Constipation. 60 Cap 0   • psyllium (METAMUCIL SMOOTH TEXTURE) 28 % packet Take 1 Packet by mouth every day. 30 Each 0   • Linaclotide (LINZESS) 145 MCG Cap Take  by mouth.     • PATADAY 0.2 % Solution INSTILL ONE DROP INTO EACH EYE ONCE DAILY 3 Bottle 3   • polyethylene glycol 3350 (MIRALAX) Powder Take 17 g by mouth every day. Take 17 g with 8 ounces of liquid, mix well and drink daily 1581 g 1   • alendronate (FOSAMAX) 70 MG Tab TAKE ONE TABLET BY MOUTH ONCE A WEEK 12 Tab 3   • pregabalin (LYRICA) 100 MG Cap Take 1 Cap by mouth 3 times a day. 270 Cap 3   • donepezil (ARICEPT) 10 MG tablet Take 1 Tab by mouth every day. 90 Tab 1   • Olopatadine HCl (PATADAY) 0.2 % Solution 1 Drop by Ophthalmic route as needed (For allergies and dry eye).     • Cholecalciferol (VITAMIN D PO) Take 1 Tab by mouth every day.     • Omega-3 Fatty Acids (FISH OIL) 1000 MG Cap capsule Take 2,000 mg by mouth every day.     • CALCIUM PO Take 1 Tab by mouth every day.     • Glucos-Chondroit-Hyaluron-MSM (GLUCOSAMINE CHONDROITIN JOINT PO) Take 1 Tab by mouth every day.     • montelukast (SINGULAIR) 10 MG Tab Take 1 Tab by mouth every day. 30 Tab 30     No current facility-administered medications for this visit.        Allergies   Allergen Reactions   • Etodolac Anaphylaxis     dizziness   • Lactose Diarrhea     intolerant       ROS  Constitutional: Negative. Negative for fever, chills, weight loss, malaise/fatigue and diaphoresis.   HENT: Negative. Negative for hearing loss, ear pain, nosebleeds, congestion, sore throat, neck pain,  tinnitus and ear discharge.   Respiratory: Negative. Negative for cough, hemoptysis, sputum production, shortness of breath, wheezing and stridor.   Cardiovascular: Negative. Negative for chest pain, palpitations, orthopnea, claudication, leg swelling and PND.   Gastrointestinal: Denies nausea, vomiting, diarrhea, constipation, heartburn, melena or hematochezia.  Genitourinary: Denies dysuria, hematuria, urinary incontinence.        Objective:     Blood pressure 124/82, pulse 98, temperature 37.1 °C (98.7 °F), resp. rate 16, height 1.524 m (5'), weight 67.1 kg (148 lb), SpO2 98 %. Body mass index is 28.9 kg/m².    Physical Exam:  Vitals reviewed.  Constitutional: appears well-developed and well-nourished. No distress.   Cardiovascular: Normal rate, regular rhythm, normal heart sounds and intact distal pulses. Exam reveals no gallop and no friction rub. No murmur heard. No carotid bruits.   Pulmonary/Chest: Effort normal and breath sounds normal. No stridor. No respiratory distress. no wheezes or rales. exhibits no tenderness.   Musculoskeletal: Normal range of motion. exhibits no edema. bryn pedal pulses 2+.  Skin: Skin is warm and dry. No diaphoresis.   ABD:  Bs + 4.  No hsm or CVA tenderness.  + suprapubic tenderenss.  Rectum intact w/o bleeding.  Fissure noted.    Psychiatric: Normal mood and affect. Behavior is normal.      Assessment and Plan:     The following treatment plan was discussed:    1. Acute cystitis with hematuria  sulfamethoxazole-trimethoprim (BACTRIM DS) 800-160 MG tablet    URINALYSIS,CULTURE IF INDICATED    CANCELED: URINALYSIS,CULTURE IF INDICATED    UA in office + for leukocytes, blood.  bactrim x 7 days.  UA/CX to lab.     2. Rectal fissure  NITRO-BID 2 % Ointment    refilled oint.     3. IFG (impaired fasting glucose)      do lab and f/u for review 1 month.  per daughter she has not been on a healhty diet recently.           Followup: Return in about 4 weeks (around 12/27/2017) for Long.

## 2017-11-30 ENCOUNTER — TELEPHONE (OUTPATIENT)
Dept: MEDICAL GROUP | Facility: MEDICAL CENTER | Age: 82
End: 2017-11-30

## 2017-12-03 ENCOUNTER — HOSPITAL ENCOUNTER (EMERGENCY)
Facility: MEDICAL CENTER | Age: 82
End: 2017-12-03
Attending: EMERGENCY MEDICINE
Payer: MEDICARE

## 2017-12-03 ENCOUNTER — APPOINTMENT (OUTPATIENT)
Dept: RADIOLOGY | Facility: MEDICAL CENTER | Age: 82
End: 2017-12-03
Attending: EMERGENCY MEDICINE
Payer: MEDICARE

## 2017-12-03 ENCOUNTER — RESOLUTE PROFESSIONAL BILLING HOSPITAL PROF FEE (OUTPATIENT)
Dept: HOSPITALIST | Facility: MEDICAL CENTER | Age: 82
End: 2017-12-03
Payer: MEDICARE

## 2017-12-03 VITALS
HEART RATE: 79 BPM | WEIGHT: 142.64 LBS | RESPIRATION RATE: 16 BRPM | BODY MASS INDEX: 28 KG/M2 | DIASTOLIC BLOOD PRESSURE: 65 MMHG | OXYGEN SATURATION: 96 % | HEIGHT: 60 IN | TEMPERATURE: 98.3 F | SYSTOLIC BLOOD PRESSURE: 111 MMHG

## 2017-12-03 DIAGNOSIS — R10.31 RIGHT LOWER QUADRANT ABDOMINAL PAIN: ICD-10-CM

## 2017-12-03 LAB
ALBUMIN SERPL BCP-MCNC: 4.5 G/DL (ref 3.2–4.9)
ALBUMIN/GLOB SERPL: 1.6 G/DL
ALP SERPL-CCNC: 64 U/L (ref 30–99)
ALT SERPL-CCNC: 19 U/L (ref 2–50)
ANION GAP SERPL CALC-SCNC: 11 MMOL/L (ref 0–11.9)
APPEARANCE UR: ABNORMAL
APPEARANCE UR: ABNORMAL
AST SERPL-CCNC: 26 U/L (ref 12–45)
BACTERIA #/AREA URNS HPF: ABNORMAL /HPF
BASOPHILS # BLD AUTO: 1.5 % (ref 0–1.8)
BASOPHILS # BLD: 0.08 K/UL (ref 0–0.12)
BILIRUB SERPL-MCNC: 0.6 MG/DL (ref 0.1–1.5)
BILIRUB UR QL STRIP.AUTO: NEGATIVE
BUN SERPL-MCNC: 12 MG/DL (ref 8–22)
CALCIUM SERPL-MCNC: 9.4 MG/DL (ref 8.4–10.2)
CHLORIDE SERPL-SCNC: 104 MMOL/L (ref 96–112)
CO2 SERPL-SCNC: 24 MMOL/L (ref 20–33)
COLOR UR: YELLOW
COLOR UR: YELLOW
CREAT SERPL-MCNC: 0.9 MG/DL (ref 0.5–1.4)
CULTURE IF INDICATED INDCX: YES UA CULTURE
EOSINOPHIL # BLD AUTO: 0.15 K/UL (ref 0–0.51)
EOSINOPHIL NFR BLD: 2.8 % (ref 0–6.9)
EPI CELLS #/AREA URNS HPF: ABNORMAL /HPF
ERYTHROCYTE [DISTWIDTH] IN BLOOD BY AUTOMATED COUNT: 46.6 FL (ref 35.9–50)
GFR SERPL CREATININE-BSD FRML MDRD: >60 ML/MIN/1.73 M 2
GLOBULIN SER CALC-MCNC: 2.9 G/DL (ref 1.9–3.5)
GLUCOSE SERPL-MCNC: 109 MG/DL (ref 65–99)
GLUCOSE UR STRIP.AUTO-MCNC: NEGATIVE MG/DL
GLUCOSE UR STRIP.AUTO-MCNC: NEGATIVE MG/DL
HCT VFR BLD AUTO: 45.8 % (ref 37–47)
HGB BLD-MCNC: 15.5 G/DL (ref 12–16)
IMM GRANULOCYTES # BLD AUTO: 0.01 K/UL (ref 0–0.11)
IMM GRANULOCYTES NFR BLD AUTO: 0.2 % (ref 0–0.9)
KETONES UR STRIP.AUTO-MCNC: NEGATIVE MG/DL
KETONES UR STRIP.AUTO-MCNC: NEGATIVE MG/DL
LEUKOCYTE ESTERASE UR QL STRIP.AUTO: ABNORMAL
LEUKOCYTE ESTERASE UR QL STRIP.AUTO: ABNORMAL
LIPASE SERPL-CCNC: 35 U/L (ref 7–58)
LYMPHOCYTES # BLD AUTO: 1.83 K/UL (ref 1–4.8)
LYMPHOCYTES NFR BLD: 33.7 % (ref 22–41)
MCH RBC QN AUTO: 31 PG (ref 27–33)
MCHC RBC AUTO-ENTMCNC: 33.8 G/DL (ref 33.6–35)
MCV RBC AUTO: 91.6 FL (ref 81.4–97.8)
MICRO URNS: ABNORMAL
MONOCYTES # BLD AUTO: 0.52 K/UL (ref 0–0.85)
MONOCYTES NFR BLD AUTO: 9.6 % (ref 0–13.4)
MUCOUS THREADS #/AREA URNS HPF: ABNORMAL /HPF
NEUTROPHILS # BLD AUTO: 2.84 K/UL (ref 2–7.15)
NEUTROPHILS NFR BLD: 52.2 % (ref 44–72)
NITRITE UR QL STRIP.AUTO: NEGATIVE
NITRITE UR QL STRIP.AUTO: NEGATIVE
NRBC # BLD AUTO: 0 K/UL
NRBC BLD AUTO-RTO: 0 /100 WBC
PH UR STRIP.AUTO: 6 [PH]
PH UR STRIP.AUTO: 6 [PH]
PLATELET # BLD AUTO: 216 K/UL (ref 164–446)
PMV BLD AUTO: 11.8 FL (ref 9–12.9)
POTASSIUM SERPL-SCNC: 4.1 MMOL/L (ref 3.6–5.5)
PROT SERPL-MCNC: 7.4 G/DL (ref 6–8.2)
PROT UR QL STRIP: NEGATIVE MG/DL
PROT UR QL STRIP: NEGATIVE MG/DL
RBC # BLD AUTO: 5 M/UL (ref 4.2–5.4)
RBC # URNS HPF: ABNORMAL /HPF
RBC UR QL AUTO: ABNORMAL
RBC UR QL AUTO: NEGATIVE
SODIUM SERPL-SCNC: 139 MMOL/L (ref 135–145)
SP GR UR REFRACTOMETRY: 1.02
SP GR UR STRIP.AUTO: >=1.03
TROPONIN I SERPL-MCNC: <0.02 NG/ML (ref 0–0.04)
UNIDENT CRYS URNS QL MICRO: ABNORMAL /HPF
WBC # BLD AUTO: 5.4 K/UL (ref 4.8–10.8)
WBC #/AREA URNS HPF: ABNORMAL /HPF

## 2017-12-03 PROCEDURE — 87086 URINE CULTURE/COLONY COUNT: CPT

## 2017-12-03 PROCEDURE — 700117 HCHG RX CONTRAST REV CODE 255: Performed by: EMERGENCY MEDICINE

## 2017-12-03 PROCEDURE — 93005 ELECTROCARDIOGRAM TRACING: CPT | Performed by: EMERGENCY MEDICINE

## 2017-12-03 PROCEDURE — 85025 COMPLETE CBC W/AUTO DIFF WBC: CPT

## 2017-12-03 PROCEDURE — 80053 COMPREHEN METABOLIC PANEL: CPT

## 2017-12-03 PROCEDURE — 99285 EMERGENCY DEPT VISIT HI MDM: CPT

## 2017-12-03 PROCEDURE — 83690 ASSAY OF LIPASE: CPT

## 2017-12-03 PROCEDURE — 81001 URINALYSIS AUTO W/SCOPE: CPT

## 2017-12-03 PROCEDURE — 96374 THER/PROPH/DIAG INJ IV PUSH: CPT

## 2017-12-03 PROCEDURE — 81002 URINALYSIS NONAUTO W/O SCOPE: CPT

## 2017-12-03 PROCEDURE — 96375 TX/PRO/DX INJ NEW DRUG ADDON: CPT

## 2017-12-03 PROCEDURE — 36415 COLL VENOUS BLD VENIPUNCTURE: CPT

## 2017-12-03 PROCEDURE — 74177 CT ABD & PELVIS W/CONTRAST: CPT

## 2017-12-03 PROCEDURE — 700105 HCHG RX REV CODE 258: Performed by: EMERGENCY MEDICINE

## 2017-12-03 PROCEDURE — 99283 EMERGENCY DEPT VISIT LOW MDM: CPT | Performed by: HOSPITALIST

## 2017-12-03 PROCEDURE — 84484 ASSAY OF TROPONIN QUANT: CPT

## 2017-12-03 PROCEDURE — 700111 HCHG RX REV CODE 636 W/ 250 OVERRIDE (IP): Performed by: EMERGENCY MEDICINE

## 2017-12-03 RX ORDER — ONDANSETRON 2 MG/ML
4 INJECTION INTRAMUSCULAR; INTRAVENOUS ONCE
Status: COMPLETED | OUTPATIENT
Start: 2017-12-03 | End: 2017-12-03

## 2017-12-03 RX ORDER — FAMOTIDINE 40 MG/1
40 TABLET, FILM COATED ORAL 2 TIMES DAILY
Qty: 20 TAB | Refills: 0 | Status: SHIPPED | OUTPATIENT
Start: 2017-12-03 | End: 2017-12-03

## 2017-12-03 RX ORDER — MORPHINE SULFATE 4 MG/ML
4 INJECTION, SOLUTION INTRAMUSCULAR; INTRAVENOUS ONCE
Status: COMPLETED | OUTPATIENT
Start: 2017-12-03 | End: 2017-12-03

## 2017-12-03 RX ORDER — SULFAMETHOXAZOLE AND TRIMETHOPRIM 800; 160 MG/1; MG/1
1 TABLET ORAL 2 TIMES DAILY
Status: SHIPPED | COMMUNITY
Start: 2017-11-29 | End: 2017-12-03

## 2017-12-03 RX ORDER — ALUMINA, MAGNESIA, AND SIMETHICONE 2400; 2400; 240 MG/30ML; MG/30ML; MG/30ML
15 SUSPENSION ORAL 4 TIMES DAILY PRN
Qty: 560 ML | Refills: 0 | Status: SHIPPED | OUTPATIENT
Start: 2017-12-03 | End: 2017-12-03

## 2017-12-03 RX ORDER — SODIUM CHLORIDE 9 MG/ML
INJECTION, SOLUTION INTRAVENOUS CONTINUOUS
Status: DISCONTINUED | OUTPATIENT
Start: 2017-12-03 | End: 2017-12-03 | Stop reason: HOSPADM

## 2017-12-03 RX ORDER — POLYETHYLENE GLYCOL 3350 17 G/17G
17 POWDER, FOR SOLUTION ORAL PRN
Status: SHIPPED | COMMUNITY
End: 2019-03-21 | Stop reason: SDUPTHER

## 2017-12-03 RX ORDER — ALUMINA, MAGNESIA, AND SIMETHICONE 2400; 2400; 240 MG/30ML; MG/30ML; MG/30ML
15 SUSPENSION ORAL 4 TIMES DAILY PRN
Qty: 560 ML | Refills: 0 | Status: SHIPPED | OUTPATIENT
Start: 2017-12-03 | End: 2018-05-21

## 2017-12-03 RX ORDER — DOCUSATE SODIUM 100 MG/1
100 CAPSULE, LIQUID FILLED ORAL
Status: SHIPPED | COMMUNITY
End: 2019-06-10

## 2017-12-03 RX ORDER — FAMOTIDINE 40 MG/1
40 TABLET, FILM COATED ORAL 2 TIMES DAILY
Qty: 20 TAB | Refills: 0 | Status: SHIPPED | OUTPATIENT
Start: 2017-12-03 | End: 2017-12-13

## 2017-12-03 RX ORDER — ALENDRONATE SODIUM 70 MG/1
70 TABLET ORAL
COMMUNITY
End: 2018-01-24

## 2017-12-03 RX ADMIN — MORPHINE SULFATE 4 MG: 4 INJECTION INTRAVENOUS at 10:37

## 2017-12-03 RX ADMIN — IOHEXOL 100 ML: 350 INJECTION, SOLUTION INTRAVENOUS at 12:29

## 2017-12-03 RX ADMIN — SODIUM CHLORIDE 1000 ML: 9 INJECTION, SOLUTION INTRAVENOUS at 10:37

## 2017-12-03 RX ADMIN — ONDANSETRON 4 MG: 2 INJECTION, SOLUTION INTRAMUSCULAR; INTRAVENOUS at 10:37

## 2017-12-03 ASSESSMENT — PAIN SCALES - GENERAL
PAINLEVEL_OUTOF10: 6
PAINLEVEL_OUTOF10: 7

## 2017-12-03 NOTE — ED NOTES
Med rec updated and complete  Allergies reviewed  Pt daughter had pts RX bottles at bedside, went over RX bottles with daughter, returned RX bottles back to pts daughter.

## 2017-12-03 NOTE — ED PROVIDER NOTES
ED Provider Note  CHIEF COMPLAINT  Chief Complaint   Patient presents with   • Abdominal Pain       HPI  Marii Neal is a 82 y.o. female who presents with history of mid upper abdominal pain, for the last 24 hours, pain is gradual in onset, moderate now severe, dull, nausea without vomiting. No diarrhea no fever no chills no dysuria urgency or frequency. No radiation. History of diagnosis of urinary tract infection 2 days ago placed on antibiotics. History of similar upper abdominal pain in the past, extensive workup was done but the pain never diagnosed.     REVIEW OF SYSTEMS  See HPI for further details. History of allergic rhinitis arthritis, urinary tract infection diverticulitis H. pylori infection, spinal stenosis osteoporosis pancreatitis shinglesDenies other G.I., G.U.. endrocine, cardiovascular, respriatory or neurological problems.  All other systems are negative.     PAST MEDICAL HISTORY  Past Medical History:   Diagnosis Date   • Allergic rhinitis    • Arthritis     hand pain and numbness   • ASCUS on Pap smear 5/8/09   • DDD (degenerative disc disease)    • Diverticulitis    • Fatty liver    • H. pylori infection 3/17/08    treated with prevpac   • Hyperlipidemia LDL goal < 100    • Lumbar stenosis     MRI lumbar spine 2011 showed multilevel OA and multiple bulging with stenosis   • Metabolic syndrome    • Osteoporosis    • Pancreatitis 2002   • Peripheral neuropathy (CMS-HCC)    • Shingles    • Vitamin d deficiency        FAMILY HISTORY  Family History   Problem Relation Age of Onset   • Family history unknown: Yes       SOCIAL HISTORY  Social History     Social History   • Marital status:      Spouse name: N/A   • Number of children: N/A   • Years of education: N/A     Social History Main Topics   • Smoking status: Never Smoker   • Smokeless tobacco: Never Used   • Alcohol use No      Comment: rare   • Drug use: No   • Sexual activity: No     Other Topics Concern   • Not on file      Social History Narrative   • No narrative on file       SURGICAL HISTORY  Past Surgical History:   Procedure Laterality Date   • CHOLECYSTECTOMY     • LUMBAR DISC REPLACEMENT         CURRENT MEDICATIONS  Home Medications    **Home medications have not yet been reviewed for this encounter**         ALLERGIES  Allergies   Allergen Reactions   • Etodolac Anaphylaxis     dizziness   • Lactose Diarrhea     intolerant       PHYSICAL EXAM  VITAL SIGNS: /63   Pulse 71   Temp 36.8 °C (98.3 °F)   Resp 16   Ht 1.524 m (5')   Wt 64.7 kg (142 lb 10.2 oz)   SpO2 95%   BMI 27.86 kg/m²    Constitutional: Well developed, Well nourished, No acute distress, Non-toxic appearance.   HENT: Normocephalic, Atraumatic, Bilateral external ears normal, Oropharynx moist, No oral exudates, Nose normal.   Eyes: PERRL, EOMI, Conjunctiva normal, No discharge.   Neck: Normal range of motion, No tenderness, Supple, No stridor.   Lymphatic: No lymphadenopathy noted.   Cardiovascular: Normal heart rate, Normal rhythm, No murmurs, No rubs, No gallops.   Thorax & Lungs: Normal breath sounds, No respiratory distress, No wheezing, No chest tenderness.   Abdomen: Diffusely tender mid upper abdomen, no guarding no rigidity and the abdomen is soft.  No masses, No pulsatile masses.  Later on tenderness was confined to right lower quadrant.  Skin: Warm, Dry, No erythema, No rash.   Back: No tenderness, No CVA tenderness.   Extremities: Intact distal pulses, No edema, No tenderness, No cyanosis, No clubbing.   Musculoskeletal: Good range of motion in all major joints. No tenderness to palpation or major deformities noted.   Neurologic: Alert & oriented x 3, Normal motor function, Normal sensory function, No focal deficits noted.   Psychiatric: Affect normal, Judgment normal, Mood normal.   EKG Interpretation    Interpreted by me    Rhythm: normal sinus   Rate: normal  Axis: normal  Ectopy: none  Conduction: normal  ST Segments: Slight ST  depression in the lateral leads  T Waves: no acute change  Q Waves: none    Clinical Impression: I do not have an old EKG to compare to      RADIOLOGY/PROCEDURES  CT-ABDOMEN-PELVIS WITH   Final Result      1.  There is no acute inflammatory process within the abdomen or pelvis.   2.  Surgically absent gallbladder with minimal biliary prominence, unchanged and probably physiologic.   3.  There is no evidence of bowel obstruction.   4.  Small fat-containing umbilical hernia is again seen.                  COURSE & MEDICAL DECISION MAKING  Pertinent Labs & Imaging studies reviewed. (See chart for details)White count normal hematocrit and normal platelet count normal, there is no shift, electrolytes normal renal function normal liver function normal, urinalysis, small leukocyte esterase, negative nitrites        She has been having upper abdominal pain, mid upper abdomen for the last 24 hours, given IV normal saline and morphine Zofran    Workup today, is negative. CAT scan normal white count normal urinalysis not consistent with urinary tract infection. She continues however to have pain right lower quadrant. Earlier in the day she had come in with mid upper abdominal pain that is now confined to the right lower quadrant. However CAT scan is read is no appendicitis.  Evidently she has had similar episodes of abdominal pain multiple times that of had extensive workups including CAT scans. Each time workup is been negative. Pain has resolved on its own.  She was recently treated for urinary tract infection however that appears to have resolved him as urine demonstrated 2/5 white cells.    . She is 82 years old, still complaining of abdominal pain and has tenderness in right lower quadrant. No evidence of diverticulitis appendicitis. No evidence urinary tract infection. However I have talked with the hospitalist about admission.  FINAL IMPRESSION  1.   1. Right lower quadrant abdominal pain            2.   3.      Disposition  I have talked with the hospitalist about admission  Electronically signed by: Noe Curtis, 12/3/2017 10:04 AM

## 2017-12-04 NOTE — CONSULTS
DATE OF SERVICE:  12/03/2017    REQUESTING PHYSICIAN:  Dr. Noe Curtis.    REASON FOR CONSULTATION:  Abdominal pain.    HISTORY OF PRESENT ILLNESS:  This is an 82-year-old female with a recent   treatment course for urinary tract infection including Bactrim.  The patient   apparently was treated since the 29th.  She since then for the last 3 days has   developed abdominal discomfort in the mid epigastrium.  She states it is   sharp.  She denies any other associated symptoms as diarrhea, nausea,   vomiting.  The patient is a fairly poor historian.  She does have some mild   memory loss.  The patient is here with her family.  She has her son and   daughter-in-law at the bedside.  The patient was fully worked up in the   emergency room including laboratory workup and a CT of the abdomen including a   urinalysis.  The patient was not found to have any abnormality on workup.    The patient feels better.  She received a mild dose of morphine.  Her history   is reviewed.    ALLERGIES:  ETODOLAC as well as LACTOSE.    MEDICATIONS:  Medication regimen includes  1.  Fosamax 70 mg every 7 days.  2.  Lipitor 40 mg daily.  3.  Calcium p.o. daily.  4.  Vitamin D daily.  5.  Diclofenac gel p.r.n.  6.  Docusate sodium 100 mg at bedtime.  7.  Aricept 10 mg daily.  8.  Lexapro 20 mg daily.  9.  Glucosamine chondroitin joint 1 tablet p.o. daily.  10.  Synthroid 75 mcg daily.  11.  Linzess 145 mcg daily.  12.  Namenda 5 mg b.i.d.  13.  Singular 10 mg daily.  14.  Nitroglycerin ointment p.r.n. hemorrhoidal pain.  15.  Omega-3 fatty acid 2000 mg daily.  16.  MiraLax 17 grams p.o. daily p.r.n.  17.  Lyrica 100 mg p.o. t.i.d.    PAST MEDICAL HISTORY:  1.  Hypothyroidism.  2.  Dementia.  3.  History of peripheral neuropathy.  4.  History of pancreatitis reported.  5.  Degenerative disk and joint disease.  6.  History of diverticulosis.  7.  Dyslipidemia.    PAST SURGICAL HISTORY:  1.  History of cholecystectomy.  2.  History of lumbar  disk repair.    SOCIAL HISTORY:  The patient is a nonsmoker and nondrinker, lives with family.    FAMILY HISTORY:  Reviewed and noncontributory.    REVIEW OF SYSTEMS:  Negative per AMA and CMS criteria other than outlined in   the HPI.    PHYSICAL EXAMINATION:  VITAL SIGNS:  The patient is found to have temperature of 36.8, pulse 74,   respirations 16, blood pressure 110/65.  The patient was saturating 85%   initially.  I am not sure if that was a true number; later, she saturates fine   at 96% on room air.  GENERAL:  This is a pleasant  female, in no acute distress, no acute   respiratory distress.  Well developed, well nourished.  HEENT:  Normocephalic, atraumatic.  Mucous membranes are moist.  Nasopharynx   otherwise clear.  NECK:  Stiff range of motion.  No lymphadenopathy or thyromegaly.  CHEST:  Normal bony structures.  LUNGS:  Clear to auscultation anteriorly.  HEART:  Regular rate.  S1 and S2 are normal.  ABDOMEN:  Smooth, protuberant.  There is tenderness in the epigastrium.  There   are no abnormal structures found.  CT shows an umbilical hernia, which is   difficult to palpate.  There is no hepatosplenomegaly.  No rebound, no   guarding.  GENITOURINARY:  Normal external female genitalia.  RECTAL:  Exam is deferred.  MUSCULOSKELETAL:  No clubbing, cyanosis, or edema.  NEUROLOGIC:  The patient is alert and oriented x4, does show some subtle   memory deficits per family.    LABORATORY DATA AND IMAGING:  CBC is essentially normal.  Chemistry also   normal.  There is slight elevation of glucose to 109.  Troponin negative.    Urinalysis fairly unremarkable.  Her CT abdomen and pelvis does not show any   acute abnormalities.  The patient is status post cholecystectomy, small fat   containing umbilical hernia is seen.    ASSESSMENT AND PLAN:  1.  Abdominal pain, question of gastritis versus medication effect from   Bactrim.  The patient at this time had a negative workup.  I would place the   patient on a  brief course of Pepcid and Maalox Plus p.r.n. and pain medication   as needed.  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  Her exam and other workup here was   negative.  2.  Recent urinary tract infection.  It seems to have resolved.  3.  Mild dementia.  Continue medication regimen.  4.  Hypothyroidism.  Continue medication regimen.  5.  Advanced age.    OVERALL PLAN:  The patient at this time is able to be discharged.  I wrote for   Pepcid and Maalox.  The patient is to follow up with her primary care   physician as indicated and needed and to continue her current medication   regimen as previously taken.  I instructed to avoid NSAIDs for some time   thinking that the patient has some degree of gastritis at this time.  The   patient and family understand.  She will be discharged with close outpatient   followup with her primary care physician.       ____________________________________     MD TOM DOYLE / MARJORIE    DD:  12/03/2017 17:50:02  DT:  12/03/2017 18:51:56    D#:  [id number]  Job#:  [id number]

## 2017-12-05 LAB
BACTERIA UR CULT: NORMAL
EKG IMPRESSION: NORMAL
SIGNIFICANT IND 70042: NORMAL
SITE SITE: NORMAL
SOURCE SOURCE: NORMAL

## 2017-12-06 DIAGNOSIS — E03.8 OTHER SPECIFIED HYPOTHYROIDISM: ICD-10-CM

## 2017-12-06 RX ORDER — LEVOTHYROXINE SODIUM 0.03 MG/1
TABLET ORAL
Qty: 90 TAB | Refills: 1 | Status: SHIPPED | OUTPATIENT
Start: 2017-12-06 | End: 2018-04-04 | Stop reason: SDUPTHER

## 2017-12-11 DIAGNOSIS — R41.3 MEMORY LOSS, SHORT TERM: ICD-10-CM

## 2017-12-11 DIAGNOSIS — F01.50 VASCULAR DEMENTIA WITHOUT BEHAVIORAL DISTURBANCE (HCC): ICD-10-CM

## 2017-12-12 RX ORDER — DONEPEZIL HYDROCHLORIDE 10 MG/1
TABLET, FILM COATED ORAL
Qty: 90 TAB | Refills: 1 | Status: SHIPPED | OUTPATIENT
Start: 2017-12-12 | End: 2019-06-10

## 2017-12-12 NOTE — TELEPHONE ENCOUNTER
Requested Prescriptions     Signed Prescriptions Disp Refills   • donepezil (ARICEPT) 10 MG tablet 90 Tab 1     Sig: TAKE ONE TABLET BY MOUTH ONCE DAILY     Authorizing Provider: CRISTINA SHEA A.P.R.N.

## 2017-12-13 NOTE — TELEPHONE ENCOUNTER
Was the patient seen in the last year in this department? Yes     Does patient have an active prescription for medications requested? No     Received Request Via: Patient     Pt is going to [place], asking for a three month fill.

## 2018-01-23 DIAGNOSIS — G62.89 OTHER POLYNEUROPATHY: ICD-10-CM

## 2018-01-23 DIAGNOSIS — M48.062 SPINAL STENOSIS OF LUMBAR REGION WITH NEUROGENIC CLAUDICATION: ICD-10-CM

## 2018-01-23 DIAGNOSIS — F32.89 OTHER DEPRESSION: ICD-10-CM

## 2018-01-23 DIAGNOSIS — R41.3 SHORT-TERM MEMORY LOSS: ICD-10-CM

## 2018-01-24 RX ORDER — ESCITALOPRAM OXALATE 20 MG/1
20 TABLET ORAL DAILY
Qty: 90 TAB | Refills: 3 | Status: SHIPPED | OUTPATIENT
Start: 2018-01-24 | End: 2018-08-14

## 2018-01-24 RX ORDER — ALENDRONATE SODIUM 70 MG/1
70 TABLET ORAL
Qty: 12 TAB | Refills: 3 | Status: SHIPPED | OUTPATIENT
Start: 2018-01-24 | End: 2019-01-24 | Stop reason: SDUPTHER

## 2018-01-24 RX ORDER — PREGABALIN 100 MG/1
100 CAPSULE ORAL 3 TIMES DAILY
Qty: 270 CAP | Refills: 0 | Status: SHIPPED
Start: 2018-01-24 | End: 2018-04-23 | Stop reason: SDUPTHER

## 2018-01-24 RX ORDER — DONEPEZIL HYDROCHLORIDE 5 MG/1
5 TABLET, FILM COATED ORAL EVERY EVENING
Qty: 90 TAB | Refills: 3 | OUTPATIENT
Start: 2018-01-24

## 2018-03-05 ENCOUNTER — APPOINTMENT (OUTPATIENT)
Dept: PULMONOLOGY | Facility: HOSPICE | Age: 83
End: 2018-03-05
Payer: MEDICARE

## 2018-04-04 DIAGNOSIS — R41.3 SHORT-TERM MEMORY LOSS: ICD-10-CM

## 2018-04-04 DIAGNOSIS — E03.8 OTHER SPECIFIED HYPOTHYROIDISM: ICD-10-CM

## 2018-04-04 RX ORDER — LEVOTHYROXINE SODIUM 0.03 MG/1
TABLET ORAL
Qty: 90 TAB | Refills: 3 | Status: SHIPPED | OUTPATIENT
Start: 2018-04-04 | End: 2019-04-07 | Stop reason: SDUPTHER

## 2018-04-10 RX ORDER — DONEPEZIL HYDROCHLORIDE 5 MG/1
TABLET, FILM COATED ORAL
Qty: 90 TAB | Refills: 3 | Status: SHIPPED | OUTPATIENT
Start: 2018-04-10 | End: 2018-05-21

## 2018-04-10 RX ORDER — OLOPATADINE HYDROCHLORIDE 2 MG/ML
SOLUTION OPHTHALMIC
Qty: 3 BOTTLE | Refills: 3 | Status: SHIPPED | OUTPATIENT
Start: 2018-04-10 | End: 2019-04-03 | Stop reason: SDUPTHER

## 2018-04-23 DIAGNOSIS — M48.062 SPINAL STENOSIS OF LUMBAR REGION WITH NEUROGENIC CLAUDICATION: ICD-10-CM

## 2018-04-23 DIAGNOSIS — G62.89 OTHER POLYNEUROPATHY: ICD-10-CM

## 2018-04-24 RX ORDER — PREGABALIN 100 MG/1
100 CAPSULE ORAL 3 TIMES DAILY
Qty: 270 CAP | Refills: 0 | Status: SHIPPED
Start: 2018-04-24 | End: 2018-07-23

## 2018-05-21 ENCOUNTER — HOSPITAL ENCOUNTER (OUTPATIENT)
Facility: MEDICAL CENTER | Age: 83
End: 2018-05-21
Attending: NURSE PRACTITIONER
Payer: MEDICARE

## 2018-05-21 ENCOUNTER — OFFICE VISIT (OUTPATIENT)
Dept: MEDICAL GROUP | Facility: MEDICAL CENTER | Age: 83
End: 2018-05-21
Payer: MEDICARE

## 2018-05-21 VITALS
HEART RATE: 74 BPM | SYSTOLIC BLOOD PRESSURE: 110 MMHG | TEMPERATURE: 97.4 F | DIASTOLIC BLOOD PRESSURE: 60 MMHG | OXYGEN SATURATION: 95 % | BODY MASS INDEX: 28.86 KG/M2 | WEIGHT: 147 LBS | HEIGHT: 60 IN

## 2018-05-21 DIAGNOSIS — R73.01 IFG (IMPAIRED FASTING GLUCOSE): ICD-10-CM

## 2018-05-21 DIAGNOSIS — F02.818 DEMENTIA ASSOCIATED WITH OTHER UNDERLYING DISEASE WITH BEHAVIORAL DISTURBANCE (HCC): ICD-10-CM

## 2018-05-21 DIAGNOSIS — E88.810 METABOLIC SYNDROME: ICD-10-CM

## 2018-05-21 DIAGNOSIS — E03.9 HYPOTHYROIDISM (ACQUIRED): ICD-10-CM

## 2018-05-21 DIAGNOSIS — M54.40 ACUTE RIGHT-SIDED LOW BACK PAIN WITH SCIATICA, SCIATICA LATERALITY UNSPECIFIED: ICD-10-CM

## 2018-05-21 DIAGNOSIS — R30.0 DYSURIA: ICD-10-CM

## 2018-05-21 DIAGNOSIS — E78.5 HYPERLIPIDEMIA LDL GOAL <100: ICD-10-CM

## 2018-05-21 DIAGNOSIS — R10.30 LOWER ABDOMINAL PAIN: ICD-10-CM

## 2018-05-21 PROCEDURE — 81001 URINALYSIS AUTO W/SCOPE: CPT

## 2018-05-21 PROCEDURE — 81002 URINALYSIS NONAUTO W/O SCOPE: CPT | Performed by: NURSE PRACTITIONER

## 2018-05-21 PROCEDURE — 99214 OFFICE O/P EST MOD 30 MIN: CPT | Performed by: NURSE PRACTITIONER

## 2018-05-21 RX ORDER — SULFAMETHOXAZOLE AND TRIMETHOPRIM 800; 160 MG/1; MG/1
1 TABLET ORAL 2 TIMES DAILY
Qty: 14 TAB | Refills: 0 | Status: SHIPPED | OUTPATIENT
Start: 2018-05-21 | End: 2018-08-24

## 2018-05-21 NOTE — PROGRESS NOTES
Subjective:     Marii Neal is a 82 y.o. female who presents with alt diarrhea with constipation.    HPI:   Seen in f/u for chronic diarrhea alt with constipatoin.  She will have several days of constipation.  Will tx constipation.  Then will have diarrhea.  She has abd pain with it.    Just came back from [place].  Has a perineal rash.  + aching.  No itching.  She feels that her intestines are coming out.   She is having lower abd and back pain  Pt is having worsening memory.  She is unable to care ofr herself.  Her daughter is having difficulty caring for her.  She just came back from [place].  Daughter there could not care for her.   Since coming back she is having lower abd pain. She is saying that she has a rash. No rash seen.    She is due updated yearly lab work with a1c for IFG, LP for chol.      Patient Active Problem List    Diagnosis Date Noted   • Lumbar stenosis      Priority: Low   • DDD (degenerative disc disease)      Priority: Low   • Osteoporosis, senile 12/03/2009     Priority: Low   • Hyperlipidemia LDL goal <100 05/09/2017   • Arthritis    • Shingles outbreak 09/03/2015   • Peripheral neuropathy (HCC) 09/03/2015   • Vitamin d deficiency    • History of pancreatitis    • Allergic rhinitis 04/14/2011   • Metabolic syndrome    • Fatty liver    • Diverticulitis    • Preventative health care 04/15/2010   • Atypical squamous cells of undetermined significance (ASCUS) on Papanicolaou smear of cervix 05/08/2009   • H. pylori infection 03/17/2008       Current medicines (including changes today)  Current Outpatient Prescriptions   Medication Sig Dispense Refill   • sulfamethoxazole-trimethoprim (BACTRIM DS) 800-160 MG tablet Take 1 Tab by mouth 2 times a day. 14 Tab 0   • pregabalin (LYRICA) 100 MG Cap Take 1 Cap by mouth 3 times a day for 90 days. 270 Cap 0   • PATADAY 0.2 % Solution INSTILL ONE DROP INTO EACH EYE ONCE DAILY 3 Bottle 3   • levothyroxine (SYNTHROID) 25 MCG Tab TAKE ONE  TABLET BY MOUTH IN THE MORNING ON EMPTY STOMACH 90 Tab 3   • alendronate (FOSAMAX) 70 MG Tab Take 1 Tab by mouth every 7 days. 12 Tab 3   • escitalopram (LEXAPRO) 20 MG tablet Take 1 Tab by mouth every day. 90 Tab 3   • donepezil (ARICEPT) 10 MG tablet TAKE ONE TABLET BY MOUTH ONCE DAILY 90 Tab 1   • memantine (NAMENDA) 5 MG Tab TAKE ONE TABLET BY MOUTH TWICE DAILY 180 Tab 3   • atorvastatin (LIPITOR) 40 MG Tab TAKE ONE TABLET BY MOUTH ONCE DAILY 90 Tab 3   • Diclofenac Sodium 1 % Gel Apply 1 Application to affected area(s) 2 Times a Day. 1 Tube 3   • montelukast (SINGULAIR) 10 MG Tab Take 1 Tab by mouth every day. 30 Tab 30   • docusate sodium (COLACE) 100 MG Cap Take 100 mg by mouth every bedtime.     • polyethylene glycol/lytes (MIRALAX) Pack Take 17 g by mouth as needed.     • Cholecalciferol (VITAMIN D PO) Take 1 Tab by mouth every day.     • Omega-3 Fatty Acids (FISH OIL) 1000 MG Cap capsule Take 2,000 mg by mouth every day.     • CALCIUM PO Take 1 Tab by mouth every day.     • Glucos-Chondroit-Hyaluron-MSM (GLUCOSAMINE CHONDROITIN JOINT PO) Take 1 Tab by mouth every day.       No current facility-administered medications for this visit.        Allergies   Allergen Reactions   • Etodolac Anaphylaxis     dizziness   • Lactose Diarrhea     intolerant       ROS  Constitutional: Negative. Negative for fever, chills, weight loss, malaise/fatigue and diaphoresis.   HENT: Negative. Negative for hearing loss, ear pain, nosebleeds, congestion, sore throat, neck pain, tinnitus and ear discharge.   Respiratory: Negative. Negative for cough, hemoptysis, sputum production, shortness of breath, wheezing and stridor.   Cardiovascular: Negative. Negative for chest pain, palpitations, orthopnea, claudication, leg swelling and PND.   Gastrointestinal: Denies nausea, vomiting, heartburn, melena or hematochezia.  Genitourinary: Denies dysuria, hematuria, urinary incontinence, frequency or urgency.        Objective:     Blood  pressure 110/60, pulse 74, temperature 36.3 °C (97.4 °F), height 1.524 m (5'), weight 66.7 kg (147 lb), SpO2 95 %, not currently breastfeeding. Body mass index is 28.71 kg/m².    Physical Exam:  Physical Exam   Vitals reviewed.  Constitutional: oriented to person, place, and time. appears well-developed and well-nourished. No distress.   Cardiovascular: Normal rate, regular rhythm, normal heart sounds and intact distal pulses.  Exam reveals no gallop and no friction rub.  No murmur heard.  No carotid bruits.   Pulmonary/Chest: Effort normal and breath sounds normal. No stridor. No respiratory distress. no wheezes or rales. exhibits no tenderness.   Musculoskeletal: Normal range of motion. exhibits no edema. bryn pedal pulses 2+.  Lymphadenopathy: no cervical or supraclavicular adenopathy.   Abd:  No CVAT,  Soft,  Bs noted in all quadrants.  No HSM.  Mild suprapubic  abdominal tenderness.  Neurological: alert and oriented to person, place, and time. exhibits normal muscle tone. Coordination normal.   Skin: Skin is warm and dry. no diaphoresis.   Psychiatric: normal mood and affect. behavior is normal.        Assessment and Plan:     The following treatment plan was discussed:    1. Dementia associated with other underlying disease with behavioral disturbance  COMP METABOLIC PANEL    refer geriatrics   2. Dysuria  URINALYSIS,CULTURE IF INDICATED    URINE CULTURE(NEW)    ua in office with mod blood, small leukocytes.  will try and get culture   3. Metabolic syndrome  COMP METABOLIC PANEL    HEMOGLOBIN A1C    MICROALBUMIN CREAT RATIO URINE   4. Hyperlipidemia LDL goal <100  COMP METABOLIC PANEL    LIPID PROFILE   5. Hypothyroidism (acquired)  TSH    FREE THYROXINE   6. Lower abdominal pain  CBC WITH DIFFERENTIAL    COMP METABOLIC PANEL    URINALYSIS,CULTURE IF INDICATED    URINE CULTURE(NEW)    sulfamethoxazole-trimethoprim (BACTRIM DS) 800-160 MG tablet   7. IFG (impaired fasting glucose)  HEMOGLOBIN A1C    MICROALBUMIN  CREAT RATIO URINE   8. Acute right-sided low back pain with sciatica, sciatica laterality unspecified  URINALYSIS,CULTURE IF INDICATED    URINE CULTURE(NEW)    sulfamethoxazole-trimethoprim (BACTRIM DS) 800-160 MG tablet         Followup: Return in about 2 weeks (around 6/4/2018).

## 2018-05-22 ENCOUNTER — TELEPHONE (OUTPATIENT)
Dept: MEDICAL GROUP | Facility: MEDICAL CENTER | Age: 83
End: 2018-05-22

## 2018-05-22 DIAGNOSIS — M54.40 ACUTE RIGHT-SIDED LOW BACK PAIN WITH SCIATICA, SCIATICA LATERALITY UNSPECIFIED: ICD-10-CM

## 2018-05-22 DIAGNOSIS — R30.0 DYSURIA: ICD-10-CM

## 2018-05-22 DIAGNOSIS — R10.30 LOWER ABDOMINAL PAIN: ICD-10-CM

## 2018-05-22 LAB
APPEARANCE UR: CLEAR
APPEARANCE UR: NORMAL
BACTERIA #/AREA URNS HPF: NEGATIVE /HPF
BILIRUB UR STRIP-MCNC: NORMAL MG/DL
COLOR UR AUTO: NORMAL
COLOR UR: YELLOW
EPI CELLS #/AREA URNS HPF: NORMAL /HPF
GLUCOSE UR STRIP.AUTO-MCNC: NEGATIVE MG/DL
GLUCOSE UR STRIP.AUTO-MCNC: NORMAL MG/DL
KETONES UR STRIP.AUTO-MCNC: NEGATIVE MG/DL
KETONES UR STRIP.AUTO-MCNC: NORMAL MG/DL
LEUKOCYTE ESTERASE UR QL STRIP.AUTO: ABNORMAL
LEUKOCYTE ESTERASE UR QL STRIP.AUTO: NORMAL
MICRO URNS: ABNORMAL
NITRITE UR QL STRIP.AUTO: NEGATIVE
NITRITE UR QL STRIP.AUTO: NORMAL
PH UR STRIP.AUTO: 6 [PH] (ref 5–8)
PH UR STRIP.AUTO: 7 [PH]
PROT UR QL STRIP: NEGATIVE MG/DL
PROT UR QL STRIP: NORMAL MG/DL
RBC # URNS HPF: NORMAL /HPF
RBC UR QL AUTO: NEGATIVE
RBC UR QL AUTO: NORMAL
SP GR UR STRIP.AUTO: 1
SP GR UR STRIP.AUTO: 1
UROBILINOGEN UR STRIP-MCNC: NORMAL MG/DL
WBC #/AREA URNS HPF: NORMAL /HPF

## 2018-05-23 LAB
ALBUMIN SERPL-MCNC: 4.2 G/DL (ref 3.5–4.7)
ALBUMIN/CREAT UR: 3.8 MG/G CREAT (ref 0–30)
ALBUMIN/GLOB SERPL: 1.8 {RATIO} (ref 1.2–2.2)
ALP SERPL-CCNC: 67 IU/L (ref 39–117)
ALT SERPL-CCNC: 14 IU/L (ref 0–32)
AST SERPL-CCNC: 22 IU/L (ref 0–40)
BASOPHILS # BLD AUTO: 0 X10E3/UL (ref 0–0.2)
BASOPHILS NFR BLD AUTO: 1 %
BILIRUB SERPL-MCNC: 0.4 MG/DL (ref 0–1.2)
BUN SERPL-MCNC: 13 MG/DL (ref 8–27)
BUN/CREAT SERPL: 17 (ref 12–28)
CALCIUM SERPL-MCNC: 9.1 MG/DL (ref 8.7–10.3)
CHLORIDE SERPL-SCNC: 104 MMOL/L (ref 96–106)
CHOLEST SERPL-MCNC: 161 MG/DL (ref 100–199)
CO2 SERPL-SCNC: 25 MMOL/L (ref 18–29)
CREAT SERPL-MCNC: 0.76 MG/DL (ref 0.57–1)
CREAT UR-MCNC: 116.9 MG/DL
EOSINOPHIL # BLD AUTO: 0.2 X10E3/UL (ref 0–0.4)
EOSINOPHIL NFR BLD AUTO: 4 %
ERYTHROCYTE [DISTWIDTH] IN BLOOD BY AUTOMATED COUNT: 14.8 % (ref 12.3–15.4)
GFR SERPLBLD CREATININE-BSD FMLA CKD-EPI: 74 ML/MIN/1.73
GFR SERPLBLD CREATININE-BSD FMLA CKD-EPI: 85 ML/MIN/1.73
GLOBULIN SER CALC-MCNC: 2.4 G/DL (ref 1.5–4.5)
GLUCOSE SERPL-MCNC: 111 MG/DL (ref 65–99)
HBA1C MFR BLD: 5.9 % (ref 4.8–5.6)
HCT VFR BLD AUTO: 44.6 % (ref 34–46.6)
HDLC SERPL-MCNC: 55 MG/DL
HGB BLD-MCNC: 14.9 G/DL (ref 11.1–15.9)
IMM GRANULOCYTES # BLD: 0 X10E3/UL (ref 0–0.1)
IMM GRANULOCYTES NFR BLD: 0 %
IMMATURE CELLS  115398: NORMAL
LABORATORY COMMENT REPORT: NORMAL
LDLC SERPL CALC-MCNC: 85 MG/DL (ref 0–99)
LYMPHOCYTES # BLD AUTO: 2 X10E3/UL (ref 0.7–3.1)
LYMPHOCYTES NFR BLD AUTO: 35 %
MCH RBC QN AUTO: 30.9 PG (ref 26.6–33)
MCHC RBC AUTO-ENTMCNC: 33.4 G/DL (ref 31.5–35.7)
MCV RBC AUTO: 93 FL (ref 79–97)
MICROALBUMIN UR-MCNC: 4.4 UG/ML
MONOCYTES # BLD AUTO: 0.6 X10E3/UL (ref 0.1–0.9)
MONOCYTES NFR BLD AUTO: 10 %
MORPHOLOGY BLD-IMP: NORMAL
NEUTROPHILS # BLD AUTO: 2.9 X10E3/UL (ref 1.4–7)
NEUTROPHILS NFR BLD AUTO: 50 %
NRBC BLD AUTO-RTO: NORMAL %
PLATELET # BLD AUTO: 224 X10E3/UL (ref 150–379)
POTASSIUM SERPL-SCNC: 4.5 MMOL/L (ref 3.5–5.2)
PROT SERPL-MCNC: 6.6 G/DL (ref 6–8.5)
RBC # BLD AUTO: 4.82 X10E6/UL (ref 3.77–5.28)
SODIUM SERPL-SCNC: 143 MMOL/L (ref 134–144)
T4 FREE SERPL-MCNC: 1.29 NG/DL (ref 0.82–1.77)
TRIGL SERPL-MCNC: 105 MG/DL (ref 0–149)
TSH SERPL DL<=0.005 MIU/L-ACNC: 2.09 UIU/ML (ref 0.45–4.5)
VLDLC SERPL CALC-MCNC: 21 MG/DL (ref 5–40)
WBC # BLD AUTO: 5.7 X10E3/UL (ref 3.4–10.8)

## 2018-06-05 ENCOUNTER — OFFICE VISIT (OUTPATIENT)
Dept: MEDICAL GROUP | Facility: MEDICAL CENTER | Age: 83
End: 2018-06-05
Payer: MEDICARE

## 2018-06-05 VITALS
HEART RATE: 90 BPM | DIASTOLIC BLOOD PRESSURE: 56 MMHG | WEIGHT: 147 LBS | BODY MASS INDEX: 28.86 KG/M2 | OXYGEN SATURATION: 91 % | SYSTOLIC BLOOD PRESSURE: 100 MMHG | TEMPERATURE: 98.3 F | HEIGHT: 60 IN

## 2018-06-05 DIAGNOSIS — R10.13 EPIGASTRIC DISCOMFORT: ICD-10-CM

## 2018-06-05 DIAGNOSIS — R73.01 IFG (IMPAIRED FASTING GLUCOSE): ICD-10-CM

## 2018-06-05 DIAGNOSIS — K21.9 GASTROESOPHAGEAL REFLUX DISEASE WITHOUT ESOPHAGITIS: ICD-10-CM

## 2018-06-05 DIAGNOSIS — E03.9 HYPOTHYROIDISM (ACQUIRED): ICD-10-CM

## 2018-06-05 DIAGNOSIS — F01.518 VASCULAR DEMENTIA WITH BEHAVIOR DISTURBANCE (HCC): ICD-10-CM

## 2018-06-05 PROCEDURE — 99214 OFFICE O/P EST MOD 30 MIN: CPT | Performed by: NURSE PRACTITIONER

## 2018-06-05 RX ORDER — OMEPRAZOLE 20 MG/1
20 CAPSULE, DELAYED RELEASE ORAL DAILY
Qty: 30 CAP | Refills: 5 | Status: SHIPPED | OUTPATIENT
Start: 2018-06-05 | End: 2018-12-04

## 2018-06-05 NOTE — PROGRESS NOTES
Subjective:     Marii Neal is a 83 y.o. female who presents with dementia.    HPI:   Seen in f/u for dementia.  Pt is stable and cared for by her family.  Pt is [place] speaking only. Family interprets for her.   She is becoming more confused.  seh will forget that she has had a bm and tell family that she is constipated. If they give her something for it and not monitor bm's she will then have diarrhea.  They have stopped treating her c/o constipation.  No further diarrhea.    She does continue to c/o abd pain.  Not on PPI.  No black tarry stools.  CT abd in dec did not show any significant abn.    Reviewed lab with pt family.  TSH, T4, alb/cr ratio, LP, CMP, CBC is wnl except glucose mildly elevated at 111.  a1c is 5.9.    Pt is not on a low carb diet.    Pt is becoming incontinent occas of both bowel and bladder.     Patient Active Problem List    Diagnosis Date Noted   • Lumbar stenosis      Priority: Low   • DDD (degenerative disc disease)      Priority: Low   • Osteoporosis, senile 12/03/2009     Priority: Low   • Hyperlipidemia LDL goal <100 05/09/2017   • Arthritis    • Shingles outbreak 09/03/2015   • Peripheral neuropathy (HCC) 09/03/2015   • Vitamin d deficiency    • History of pancreatitis    • Allergic rhinitis 04/14/2011   • Metabolic syndrome    • Fatty liver    • Diverticulitis    • Preventative health care 04/15/2010   • Atypical squamous cells of undetermined significance (ASCUS) on Papanicolaou smear of cervix 05/08/2009   • H. pylori infection 03/17/2008       Current medicines (including changes today)  Current Outpatient Prescriptions   Medication Sig Dispense Refill   • omeprazole (PRILOSEC) 20 MG delayed-release capsule Take 1 Cap by mouth every day. 30 Cap 5   • sulfamethoxazole-trimethoprim (BACTRIM DS) 800-160 MG tablet Take 1 Tab by mouth 2 times a day. 14 Tab 0   • pregabalin (LYRICA) 100 MG Cap Take 1 Cap by mouth 3 times a day for 90 days. 270 Cap 0   • PATADAY 0.2 %  Solution INSTILL ONE DROP INTO EACH EYE ONCE DAILY 3 Bottle 3   • levothyroxine (SYNTHROID) 25 MCG Tab TAKE ONE TABLET BY MOUTH IN THE MORNING ON EMPTY STOMACH 90 Tab 3   • alendronate (FOSAMAX) 70 MG Tab Take 1 Tab by mouth every 7 days. 12 Tab 3   • escitalopram (LEXAPRO) 20 MG tablet Take 1 Tab by mouth every day. 90 Tab 3   • donepezil (ARICEPT) 10 MG tablet TAKE ONE TABLET BY MOUTH ONCE DAILY 90 Tab 1   • docusate sodium (COLACE) 100 MG Cap Take 100 mg by mouth every bedtime.     • polyethylene glycol/lytes (MIRALAX) Pack Take 17 g by mouth as needed.     • memantine (NAMENDA) 5 MG Tab TAKE ONE TABLET BY MOUTH TWICE DAILY 180 Tab 3   • atorvastatin (LIPITOR) 40 MG Tab TAKE ONE TABLET BY MOUTH ONCE DAILY 90 Tab 3   • Diclofenac Sodium 1 % Gel Apply 1 Application to affected area(s) 2 Times a Day. 1 Tube 3   • Cholecalciferol (VITAMIN D PO) Take 1 Tab by mouth every day.     • Omega-3 Fatty Acids (FISH OIL) 1000 MG Cap capsule Take 2,000 mg by mouth every day.     • CALCIUM PO Take 1 Tab by mouth every day.     • Glucos-Chondroit-Hyaluron-MSM (GLUCOSAMINE CHONDROITIN JOINT PO) Take 1 Tab by mouth every day.     • montelukast (SINGULAIR) 10 MG Tab Take 1 Tab by mouth every day. 30 Tab 30     No current facility-administered medications for this visit.        Allergies   Allergen Reactions   • Etodolac Anaphylaxis     dizziness   • Lactose Diarrhea     intolerant       ROS  Constitutional: Negative. Negative for fever, chills, weight loss, malaise/fatigue and diaphoresis.   HENT: Negative. Negative for hearing loss, ear pain, nosebleeds, congestion, sore throat, neck pain, tinnitus and ear discharge.   Respiratory: Negative. Negative for cough, hemoptysis, sputum production, shortness of breath, wheezing and stridor.   Cardiovascular: Negative. Negative for chest pain, palpitations, orthopnea, claudication, leg swelling and PND.   Gastrointestinal: Denies nausea, vomiting, melena or hematochezia.  Genitourinary:  Denies dysuria, hematuria, urinary incontinence, frequency or urgency.        Objective:     Blood pressure 100/56, pulse 90, temperature 36.8 °C (98.3 °F), height 1.524 m (5'), weight 66.7 kg (147 lb), SpO2 91 %, not currently breastfeeding. Body mass index is 28.71 kg/m².    Physical Exam:  Physical Exam   Vitals reviewed.  Constitutional: oriented to person, place, and time. appears well-developed and well-nourished. No distress.   HENT:  Head: Normocephalic and atraumatic. Right Ear: External ear normal. Left Ear: External ear normal. Nose: Nose normal. Mouth/Throat: Oropharynx is clear and moist. No oropharyngeal exudate.  bryn tm wnl.  Eyes: Right eye exhibits no discharge. Left eye exhibits no discharge. No scleral icterus.  Neck: No JVD present.  Cardiovascular: Normal rate, regular rhythm, normal heart sounds and intact distal pulses.  Exam reveals no gallop and no friction rub.  No murmur heard.  No carotid bruits.   Pulmonary/Chest: Effort normal and breath sounds normal. No stridor. No respiratory distress. no wheezes or rales. exhibits no tenderness.   Musculoskeletal: amb with walker,  Lymphadenopathy: no cervical or supraclavicular adenopathy.   Abd:  No CVAT,  Soft,  Bs noted in all quadrants.  No HSM.  Mild epigastric abdominal tenderness.  Neurological: alert and oriented to person, place, and time. exhibits normal muscle tone. Coordination normal.   Skin: Skin is warm and dry. no diaphoresis.   Psychiatric: normal mood and affect. behavior is normal.        Assessment and Plan:     The following treatment plan was discussed:    1. Epigastric discomfort  omeprazole (PRILOSEC) 20 MG delayed-release capsule    mild epigastric discomfort.  try prilosec i po daily   2. Gastroesophageal reflux disease without esophagitis  omeprazole (PRILOSEC) 20 MG delayed-release capsule   3. Vascular dementia with behavior disturbance      daughter and family continue to care for pt.  they are considering nursing home  since caring for pt is becoming more difficult.  they ave seen geriatric also   4. IFG (impaired fasting glucose)      stable.  no meds at this time   5. Hypothyroidism (acquired)      stable on meds         Followup: Return in about 6 months (around 12/5/2018).

## 2018-06-18 ENCOUNTER — TELEPHONE (OUTPATIENT)
Dept: MEDICAL GROUP | Facility: MEDICAL CENTER | Age: 83
End: 2018-06-18

## 2018-06-18 DIAGNOSIS — R10.9 CHRONIC ABDOMINAL PAIN: ICD-10-CM

## 2018-06-18 DIAGNOSIS — G89.29 CHRONIC ABDOMINAL PAIN: ICD-10-CM

## 2018-06-20 NOTE — TELEPHONE ENCOUNTER
Patients daughter Johanne answer, she stated that the pain is from 7/10 pain scale. Her pain is under her belly button area. She has had labs done and everything comes back normal. But she is still in a lot of pain and it is on and off she would like a CT scan to see if anything else can cause this discomfort. Daughter also stated that when her mom goes to the bathroom for a BM she states she feels like her ano is swollen or the inside is closed where it hurts to do BM but she also has days of diarrhea as well. Please advice?

## 2018-06-20 NOTE — TELEPHONE ENCOUNTER
I did not see the patient for this complaint, therefore I am just going off of the provider's notes. It appears that her pain was mild. Before I decide on imaging, I need to know if it is worsening, and if it is in the upper abdomen or lower abdomen.    FEI Schreiber.

## 2018-06-20 NOTE — TELEPHONE ENCOUNTER
I am going to forward this information to the provider who has been seeing this patient. Please let her daughter now. I have only seen this patient twice back in the fall although I'm listed as her primary, she has been following Neha Barr at [facility] for the last 3 visits over the last 6 months including with symptoms that are going on right now.    FEI Schreiber.

## 2018-06-20 NOTE — TELEPHONE ENCOUNTER
Please let the daughter know that she has had 2 ct abd for this pain in 2016 and 2017.  Both were wnl.  i can refer to GI.  If this is a new pain she can go to UC or see one of us for eval to see if needs another ct.

## 2018-06-21 ENCOUNTER — TELEPHONE (OUTPATIENT)
Dept: MEDICAL GROUP | Facility: MEDICAL CENTER | Age: 83
End: 2018-06-21

## 2018-06-23 ENCOUNTER — APPOINTMENT (OUTPATIENT)
Dept: RADIOLOGY | Facility: MEDICAL CENTER | Age: 83
End: 2018-06-23
Attending: EMERGENCY MEDICINE
Payer: MEDICARE

## 2018-06-23 ENCOUNTER — HOSPITAL ENCOUNTER (EMERGENCY)
Facility: MEDICAL CENTER | Age: 83
End: 2018-06-23
Attending: EMERGENCY MEDICINE
Payer: MEDICARE

## 2018-06-23 VITALS
RESPIRATION RATE: 18 BRPM | OXYGEN SATURATION: 94 % | BODY MASS INDEX: 28.57 KG/M2 | HEIGHT: 60 IN | DIASTOLIC BLOOD PRESSURE: 57 MMHG | TEMPERATURE: 97.5 F | HEART RATE: 70 BPM | SYSTOLIC BLOOD PRESSURE: 110 MMHG | WEIGHT: 145.5 LBS

## 2018-06-23 DIAGNOSIS — R53.1 WEAKNESS: ICD-10-CM

## 2018-06-23 DIAGNOSIS — R10.84 GENERALIZED ABDOMINAL PAIN: ICD-10-CM

## 2018-06-23 LAB
ALBUMIN SERPL BCP-MCNC: 4 G/DL (ref 3.2–4.9)
ALBUMIN/GLOB SERPL: 1.4 G/DL
ALP SERPL-CCNC: 59 U/L (ref 30–99)
ALT SERPL-CCNC: 16 U/L (ref 2–50)
ANION GAP SERPL CALC-SCNC: 6 MMOL/L (ref 0–11.9)
APPEARANCE UR: CLEAR
AST SERPL-CCNC: 23 U/L (ref 12–45)
BASOPHILS # BLD AUTO: 1 % (ref 0–1.8)
BASOPHILS # BLD: 0.07 K/UL (ref 0–0.12)
BILIRUB SERPL-MCNC: 0.7 MG/DL (ref 0.1–1.5)
BILIRUB UR QL STRIP.AUTO: NEGATIVE
BNP SERPL-MCNC: 58 PG/ML (ref 0–100)
BUN SERPL-MCNC: 13 MG/DL (ref 8–22)
CALCIUM SERPL-MCNC: 8.8 MG/DL (ref 8.4–10.2)
CHLORIDE SERPL-SCNC: 106 MMOL/L (ref 96–112)
CO2 SERPL-SCNC: 27 MMOL/L (ref 20–33)
COLOR UR: YELLOW
CREAT SERPL-MCNC: 0.89 MG/DL (ref 0.5–1.4)
EKG IMPRESSION: NORMAL
EOSINOPHIL # BLD AUTO: 0.24 K/UL (ref 0–0.51)
EOSINOPHIL NFR BLD: 3.4 % (ref 0–6.9)
ERYTHROCYTE [DISTWIDTH] IN BLOOD BY AUTOMATED COUNT: 45.7 FL (ref 35.9–50)
GLOBULIN SER CALC-MCNC: 2.8 G/DL (ref 1.9–3.5)
GLUCOSE SERPL-MCNC: 96 MG/DL (ref 65–99)
GLUCOSE UR STRIP.AUTO-MCNC: NEGATIVE MG/DL
HCT VFR BLD AUTO: 39.9 % (ref 37–47)
HGB BLD-MCNC: 13.6 G/DL (ref 12–16)
IMM GRANULOCYTES # BLD AUTO: 0.01 K/UL (ref 0–0.11)
IMM GRANULOCYTES NFR BLD AUTO: 0.1 % (ref 0–0.9)
KETONES UR STRIP.AUTO-MCNC: NEGATIVE MG/DL
LEUKOCYTE ESTERASE UR QL STRIP.AUTO: NEGATIVE
LIPASE SERPL-CCNC: 34 U/L (ref 7–58)
LYMPHOCYTES # BLD AUTO: 2.59 K/UL (ref 1–4.8)
LYMPHOCYTES NFR BLD: 36.6 % (ref 22–41)
MAGNESIUM SERPL-MCNC: 2.2 MG/DL (ref 1.5–2.5)
MCH RBC QN AUTO: 30.8 PG (ref 27–33)
MCHC RBC AUTO-ENTMCNC: 34.1 G/DL (ref 33.6–35)
MCV RBC AUTO: 90.5 FL (ref 81.4–97.8)
MICRO URNS: NORMAL
MONOCYTES # BLD AUTO: 0.75 K/UL (ref 0–0.85)
MONOCYTES NFR BLD AUTO: 10.6 % (ref 0–13.4)
NEUTROPHILS # BLD AUTO: 3.41 K/UL (ref 2–7.15)
NEUTROPHILS NFR BLD: 48.3 % (ref 44–72)
NITRITE UR QL STRIP.AUTO: NEGATIVE
NRBC # BLD AUTO: 0 K/UL
NRBC BLD-RTO: 0 /100 WBC
PH UR STRIP.AUTO: 6.5 [PH]
PHOSPHATE SERPL-MCNC: 4 MG/DL (ref 2.5–4.5)
PLATELET # BLD AUTO: 163 K/UL (ref 164–446)
PMV BLD AUTO: 11.8 FL (ref 9–12.9)
POTASSIUM SERPL-SCNC: 3.9 MMOL/L (ref 3.6–5.5)
PROT SERPL-MCNC: 6.8 G/DL (ref 6–8.2)
PROT UR QL STRIP: NEGATIVE MG/DL
RBC # BLD AUTO: 4.41 M/UL (ref 4.2–5.4)
RBC UR QL AUTO: NEGATIVE
SODIUM SERPL-SCNC: 139 MMOL/L (ref 135–145)
SP GR UR STRIP.AUTO: <=1.005
T4 FREE SERPL-MCNC: 0.79 NG/DL (ref 0.58–1.64)
TROPONIN I SERPL-MCNC: <0.02 NG/ML (ref 0–0.04)
WBC # BLD AUTO: 7.1 K/UL (ref 4.8–10.8)

## 2018-06-23 PROCEDURE — 83690 ASSAY OF LIPASE: CPT

## 2018-06-23 PROCEDURE — 84484 ASSAY OF TROPONIN QUANT: CPT

## 2018-06-23 PROCEDURE — 71045 X-RAY EXAM CHEST 1 VIEW: CPT

## 2018-06-23 PROCEDURE — 84100 ASSAY OF PHOSPHORUS: CPT

## 2018-06-23 PROCEDURE — 36415 COLL VENOUS BLD VENIPUNCTURE: CPT

## 2018-06-23 PROCEDURE — 700117 HCHG RX CONTRAST REV CODE 255: Performed by: EMERGENCY MEDICINE

## 2018-06-23 PROCEDURE — 99284 EMERGENCY DEPT VISIT MOD MDM: CPT

## 2018-06-23 PROCEDURE — 83735 ASSAY OF MAGNESIUM: CPT

## 2018-06-23 PROCEDURE — 83880 ASSAY OF NATRIURETIC PEPTIDE: CPT

## 2018-06-23 PROCEDURE — 93005 ELECTROCARDIOGRAM TRACING: CPT | Performed by: EMERGENCY MEDICINE

## 2018-06-23 PROCEDURE — 81003 URINALYSIS AUTO W/O SCOPE: CPT

## 2018-06-23 PROCEDURE — 74177 CT ABD & PELVIS W/CONTRAST: CPT

## 2018-06-23 PROCEDURE — 85025 COMPLETE CBC W/AUTO DIFF WBC: CPT

## 2018-06-23 PROCEDURE — 80053 COMPREHEN METABOLIC PANEL: CPT

## 2018-06-23 PROCEDURE — 84439 ASSAY OF FREE THYROXINE: CPT

## 2018-06-23 RX ADMIN — IOHEXOL 100 ML: 350 INJECTION, SOLUTION INTRAVENOUS at 18:54

## 2018-06-23 ASSESSMENT — PAIN SCALES - GENERAL: PAINLEVEL_OUTOF10: 7

## 2018-06-24 NOTE — ED NOTES
Pt is accompanied by her family.  She generally ambulates with a walker, but her gait has been worsening for the past 4 months.  She wear a diaper and suffers frequent rashes.  She is progressively experiencing worsening extremity weakness for the past few weeks.

## 2018-06-24 NOTE — DISCHARGE INSTRUCTIONS
Fatiga  (Fatigue)  La fatiga es rell sensación de cansancio en todo momento, falta de energía o falta de motivación. La fatiga ocasional o leve con frecuencia es rell reacción normal a la actividad o la conner en general. Sin embargo, la fatiga de larga duración (crónica) o extrema puede indicar rell enfermedad preexistente.  INSTRUCCIONES PARA EL CUIDADO EN EL HOGAR  Controle gifford fatiga para mat si hay cambios. Las siguientes indicaciones ayudarán a aliviar cualquier molestia que pueda sentir:  · Hable con el médico acerca de cuánto debe dormir cada noche. Trate de dormir la cantidad de tiempo requerida todas las noches.  · McKenzie los medicamentos solamente calista se lo haya indicado el médico.  · Siga rell dieta saludable y nutritiva. Pida ayuda al médico si necesita hacer cambios en gifford dieta.  · Marce suficiente líquido para mantener la orina [place] o de color amarillo pálido.  · Practique actividades que lo relajen, calista yoga, meditación, terapia de masajes o acupuntura.  · Irvin ejercicios regularmente.  · Cambie las situaciones que le provocan estrés. Trate de que gifford rutina de trabajo y personal sea moderada.  · No consuma drogas.  · Limite el consumo de alcohol a no más de 1 medida por día si es atilio y no está embarazada, y 2 medidas si es hombre. Rell medida equivale a 12 onzas de cerveza, 5 onzas de vino o 1½ onzas de bebidas alcohólicas de alejandra graduación.  · McKenzie rell multivitamina, si se lo indicó el médico.  SOLICITE ATENCIÓN MÉDICA SI:  · La fatiga no mejora.  · Tiene fiebre.  · Tiene pérdida o aumento involuntario de peso.  · Tiene marina de tangela.  · Tiene dificultad para:  ¨ Dormirse.  ¨ Dormir kalee toda la noche.  · Se siente enojado, culpable, ansioso o jessica.  · No puede defecar (estreñimiento).  · Tiene la piel seca.  · Tiene hinchadas las piernas u otra parte del cuerpo.  SOLICITE ATENCIÓN MÉDICA DE INMEDIATO SI:  · Se siente confundido.  · Tiene visión borrosa.  · Sufre mareos o se desmaya.  · Sufre un  dolor intenso de tangela.  · Siente dolor intenso en el abdomen, la pelvis o la espalda.  · Tiene dolor de pecho, dificultad para respirar, o latidos cardíacos irregulares o acelerados.  · No puede orinar u orina menos de lo normal.  · Presenta sangrado anormal, calista sangrado del recto, la vagina, la nariz, los pulmones o los pezones.  · Vomita jerardo.  · Tiene pensamientos acerca de hacerse daño a sí mismo o cometer suicidio.  · Le preocupa la posibilidad de hacerle daño a otra persona.  Esta información no tiene calista fin reemplazar el consejo del médico. Asegúrese de hacerle al médico cualquier pregunta que tenga.  Document Released: 04/05/2010 Document Revised: 04/10/2017 Document Reviewed: 04/21/2015  [employer] Interactive Patient Education © 2017 [employer]  Dolor abdominal  (Abdominal Pain)    Magaly dieta de liquidos oneyda 12 horas.  Regresa si no mejora 24 horas.  Regresa mas pronto si el dolor empeora o mueve al derecho o si parece enfermo.    El dolor abdominal o dolor en el estómago puede ser causado por muchos factores. El profesional que lo asiste decidirá la gravedad de la causa dolor por medio de un examen físico y  le solicitará pruebas de y radiografías. Muchos de estos casos pueden controlarse y tratarse en casa. La mayor parte de los marina en la abigail abdominal que padecen los niños es funcional. Chacra significa que no está originado en ninguna enfermedad y que probablemente mejorará sin tratamiento.    INSTRUCCIONES para el cuidado domiciliario:  Ø No tome ni administre laxantes a menos que se lo haya indicado el profesional que lo asiste.  Ø [place] medicación para el alivio del dolor sólo si se lo ha indicado el profesional que lo asiste.      solicite ATENCIÓN médica de inmediato si:  Ø El dolor persiste.  Ø Presenta vómitos repetidas veces.  Ø Siente el dolor sólo en algunos sectores del abdomen (vientre). Si se localiza en la abigail derecha, posiblemente podría tratarse de apendicitis. En un  adulto, si se localiza en la región inferior izquierda del abdomen, podría tratarse de colitis o diverticulitis.  Ø Hay jerardo en las heces (deposiciones de color martinez brillante o jong alquitranado).    [employer]® Patient Information ©2007 [employer].

## 2018-06-24 NOTE — ED PROVIDER NOTES
ED Provider Note  CHIEF COMPLAINT  Chief Complaint   Patient presents with   • Diaper Rash   • Fatigue       HPI  Marii Neal is a 83 y.o. female who presents to the emergency department presents with her daughter and granddaughter as interpreters. She's had increasing fatigue, weakness for the last 4 weeks to point where she can't walk and her walker for last 3 days. She has had no focal deficits rather just overall global weakness. She also complains of pain in her abdomen that is dull in nature than elucidating alleviating factors and no radiation as well as slight tailbone and pelvic pain. Denies vaginal discharge, dysuria, hematochezia, melena, shortness of breath, cough, fever, lightheadedness, dizziness, neck pain, back pain, headaches, vision changes. The patient has been taking her medication including Lyrica for chronic leg pain.    REVIEW OF SYSTEMS  Positives as above. Pertinent negatives include fever, nausea, vomiting, chest pain, back pain, lightheadedness, dizziness   All other review of systems are negative    PAST MEDICAL HISTORY  Past Medical History:   Diagnosis Date   • Allergic rhinitis    • Arthritis     hand pain and numbness   • ASCUS on Pap smear 5/8/09   • DDD (degenerative disc disease)    • Diverticulitis    • Fatty liver    • H. pylori infection 3/17/08    treated with prevpac   • Hyperlipidemia LDL goal < 100    • Lumbar stenosis     MRI lumbar spine 2011 showed multilevel OA and multiple bulging with stenosis   • Metabolic syndrome    • Osteoporosis    • Pancreatitis 2002   • Peripheral neuropathy (HCC)    • Shingles    • Vitamin d deficiency        FAMILY HISTORY  Noncontributory    SOCIAL HISTORY  Social History     Social History   • Marital status:      Spouse name: N/A   • Number of children: N/A   • Years of education: N/A     Social History Main Topics   • Smoking status: Never Smoker   • Smokeless tobacco: Never Used   • Alcohol use No      Comment: Rarely    • Drug use: No   • Sexual activity: No     Other Topics Concern   • Not on file     Social History Narrative   • No narrative on file       SURGICAL HISTORY  Past Surgical History:   Procedure Laterality Date   • CHOLECYSTECTOMY     • LUMBAR DISC REPLACEMENT         CURRENT MEDICATIONS  Home Medications     Reviewed by Tyler Griffith R.N. (Registered Nurse) on 06/23/18 at 1730  Med List Status: Partial   Medication Last Dose Status   alendronate (FOSAMAX) 70 MG Tab  Active   atorvastatin (LIPITOR) 40 MG Tab 6/22/2018 Active   CALCIUM PO 6/22/2018 Active   Cholecalciferol (VITAMIN D PO) 6/22/2018 Active   Diclofenac Sodium 1 % Gel 6/22/2018 Active   docusate sodium (COLACE) 100 MG Cap  Active   donepezil (ARICEPT) 10 MG tablet 5/21/2018 Active   escitalopram (LEXAPRO) 20 MG tablet 5/21/2018 Active   Glucos-Chondroit-Hyaluron-MSM (GLUCOSAMINE CHONDROITIN JOINT PO) > 1 week Active   levothyroxine (SYNTHROID) 25 MCG Tab 6/22/2018 Active   memantine (NAMENDA) 5 MG Tab  Active   montelukast (SINGULAIR) 10 MG Tab 5/21/2018 Active   Omega-3 Fatty Acids (FISH OIL) 1000 MG Cap capsule > 1 week Active   omeprazole (PRILOSEC) 20 MG delayed-release capsule 6/22/2018 Active   PATADAY 0.2 % Solution  Active   polyethylene glycol/lytes (MIRALAX) Pack  Active   pregabalin (LYRICA) 100 MG Cap 6/22/2018 Active   sulfamethoxazole-trimethoprim (BACTRIM DS) 800-160 MG tablet  Active                ALLERGIES  Allergies   Allergen Reactions   • Etodolac Anaphylaxis     dizziness   • Lactose Diarrhea     intolerant       PHYSICAL EXAM  VITAL SIGNS: /57   Pulse 70   Temp 36.4 °C (97.5 °F)   Resp 18   Ht 1.524 m (5') Comment: Stated  Wt 66 kg (145 lb 8.1 oz)   SpO2 94%   BMI 28.42 kg/m²    Constitutional: Well developed, Well nourished, No acute distress, Non-toxic appearance.   Eyes: PERRLA, EOMI, Conjunctiva normal, No discharge.   Cardiovascular: Normal heart rate, Normal rhythm, No murmurs, No rubs, No gallops, and intact  distal pulses.   Thorax & Lungs:  No respiratory distress, no rales, no rhonchi, slight diffuse wheezing, No chest wall tenderness.   Abdomen: Bowel sounds normal, Soft, slight suprapubic tenderness, No guarding, No rebound, No pulsatile masses.   Skin: Warm, no sacral ulceration Dry, No erythema, No rash.   Extremities: Full range of motion, no deformity, no edema.  Neurologic:  Alert & oriented to month and age, Normal cognition, Cranial nerves II-XII are intact, No slurred speech, Negative finger to nose bilaterally, No pronator drift bilaterally,   strength 5/5 bilaterally, Leg raise strength 5/5 bilaterally, Plantarflexion strength 5/5 bilaterally, Dorsiflexion strength 5/5 bilaterally, Deep tendon reflexes 2/4 upper and lower extremities bilaterally, Sensation intact throughout, No Nystagmus.  Psychiatric: Affect normal for clinical presentation.      RADIOLOGY/PROCEDURES  CT-ABDOMEN-PELVIS WITH   Final Result      1.  There is no acute inflammatory process within the abdomen or pelvis.   2.  There is colonic diverticulosis without diverticulitis.   3.  There is extensive multilevel degenerative change in lumbar spine with bilateral pedicle screw fixation at L3-L5 levels. There is no gross canal stenosis.      DX-CHEST-LIMITED (1 VIEW)   Final Result      1.  No acute cardiac or pulmonary abnormalities are identified.        Results for orders placed or performed during the hospital encounter of 06/23/18   CBC WITH DIFFERENTIAL   Result Value Ref Range    WBC 7.1 4.8 - 10.8 K/uL    RBC 4.41 4.20 - 5.40 M/uL    Hemoglobin 13.6 12.0 - 16.0 g/dL    Hematocrit 39.9 37.0 - 47.0 %    MCV 90.5 81.4 - 97.8 fL    MCH 30.8 27.0 - 33.0 pg    MCHC 34.1 33.6 - 35.0 g/dL    RDW 45.7 35.9 - 50.0 fL    Platelet Count 163 (L) 164 - 446 K/uL    MPV 11.8 9.0 - 12.9 fL    Neutrophils-Polys 48.30 44.00 - 72.00 %    Lymphocytes 36.60 22.00 - 41.00 %    Monocytes 10.60 0.00 - 13.40 %    Eosinophils 3.40 0.00 - 6.90 %     Basophils 1.00 0.00 - 1.80 %    Immature Granulocytes 0.10 0.00 - 0.90 %    Nucleated RBC 0.00 /100 WBC    Neutrophils (Absolute) 3.41 2.00 - 7.15 K/uL    Lymphs (Absolute) 2.59 1.00 - 4.80 K/uL    Monos (Absolute) 0.75 0.00 - 0.85 K/uL    Eos (Absolute) 0.24 0.00 - 0.51 K/uL    Baso (Absolute) 0.07 0.00 - 0.12 K/uL    Immature Granulocytes (abs) 0.01 0.00 - 0.11 K/uL    NRBC (Absolute) 0.00 K/uL   COMP METABOLIC PANEL   Result Value Ref Range    Sodium 139 135 - 145 mmol/L    Potassium 3.9 3.6 - 5.5 mmol/L    Chloride 106 96 - 112 mmol/L    Co2 27 20 - 33 mmol/L    Anion Gap 6.0 0.0 - 11.9    Glucose 96 65 - 99 mg/dL    Bun 13 8 - 22 mg/dL    Creatinine 0.89 0.50 - 1.40 mg/dL    Calcium 8.8 8.4 - 10.2 mg/dL    AST(SGOT) 23 12 - 45 U/L    ALT(SGPT) 16 2 - 50 U/L    Alkaline Phosphatase 59 30 - 99 U/L    Total Bilirubin 0.7 0.1 - 1.5 mg/dL    Albumin 4.0 3.2 - 4.9 g/dL    Total Protein 6.8 6.0 - 8.2 g/dL    Globulin 2.8 1.9 - 3.5 g/dL    A-G Ratio 1.4 g/dL   LIPASE   Result Value Ref Range    Lipase 34 7 - 58 U/L   TROPONIN   Result Value Ref Range    Troponin I <0.02 0.00 - 0.04 ng/mL   BTYPE NATRIURETIC PEPTIDE   Result Value Ref Range    B Natriuretic Peptide 58 0 - 100 pg/mL   URINALYSIS CULTURE, IF INDICATED   Result Value Ref Range    Color Yellow     Character Clear     Specific Gravity <=1.005 <1.035    Ph 6.5 5.0 - 8.0    Glucose Negative Negative mg/dL    Ketones Negative Negative mg/dL    Protein Negative Negative mg/dL    Bilirubin Negative Negative    Nitrite Negative Negative    Leukocyte Esterase Negative Negative    Occult Blood Negative Negative    Micro Urine Req see below    MAGNESIUM   Result Value Ref Range    Magnesium 2.2 1.5 - 2.5 mg/dL   PHOSPHORUS   Result Value Ref Range    Phosphorus 4.0 2.5 - 4.5 mg/dL   FREE THYROXINE   Result Value Ref Range    Free T-4 0.79 0.58 - 1.64 ng/dL   ESTIMATED GFR   Result Value Ref Range    GFR If African American >60 >60 mL/min/1.73 m 2    GFR If Non  African American >60 >60 mL/min/1.73 m 2   EKG (ER)   Result Value Ref Range    Report       [facility] Emergency Dept.    Test Date:  2018  Pt Name:    MURPHY BRAUN    Department: E[facility]  MRN:        [id number]                      Room:       -ROOM 2  Gender:     Female                       Technician: LISETTE  :        [id number]                   Requested By:RAJENDRA LOPEZ  Order #:    [id number]                    Reading MD: RAJENDRA LOPEZ DO    Measurements  Intervals                                Axis  Rate:       75                           P:          57  KS:         170                          QRS:        22  QRSD:       89                           T:          30  QT:         426  QTc:        476    Interpretive Statements  Sinus rhythm  Borderline low voltage, extremity leads  Compared to ECG 2017 13:32:01  T-wave abnormality no longer present    Electronically Signed On 2018 19:02:15 PDT by RAJENDRA LOPEZ DO           COURSE & MEDICAL DECISION MAKING  Pertinent Labs & Imaging studies reviewed. (See chart for details)  This is a pleasant 9-year-old female presents for weakness and slight abdominal discomfort. CT scan of the abdomen was completed secondary to her slight abdominal discomfort that was negative for cholecystitis, cholelithiasis, small bowel obstruction, ischemic bowel, diverticulitis, appendicitis or significant abdominal abnormality. In addition, the patient is known to urinary tract infection, lipase is negative and her transaminases are negative and do not believe she is experiencing pancreatitis or biliary etiology of her discomfort. In addition, her EKG was negative and she has a negative troponin I do not expect her to have a cardiac etiology for her weakness. Her T4 is normal, her electric to normal and she has no evidence of leukocytosis or anemia. On reevaluation, the patient was positive by mouth, she was  ambulating without difficulty with her walker as she had multiple laps in the emergency department if she has no focal neurological deficits that would suspicious for CVA or TIA. I discussed this with the patient and the patient's daughter and they both agree that she is safe to go home. At this point, the patient has no evidence of significant infection, focal neurological deficits, overwhelming toxicity or encephalitis. She is discharged with strict return precautions.    New Prescriptions    No medications on file       FINAL IMPRESSION     1. Weakness    2. Generalized abdominal pain        DISPOSITION:  Patient will be discharged home in stable condition.    FOLLOW UP:  [facility], Emergency Dept  [id number]  [facility] [address]  [phone]    If symptoms worsen    Pollo Sam, A.P.R.N.  [address] [address]  [phone]    Schedule an appointment as soon as possible for a visit in 1 week  As needed             Electronically signed by: Milton Alonso, 6/23/2018 5:49 PM

## 2018-08-08 ENCOUNTER — HOSPITAL ENCOUNTER (OUTPATIENT)
Dept: RADIOLOGY | Facility: MEDICAL CENTER | Age: 83
End: 2018-08-08
Attending: PHYSICIAN ASSISTANT
Payer: MEDICARE

## 2018-08-08 ENCOUNTER — OFFICE VISIT (OUTPATIENT)
Dept: URGENT CARE | Facility: PHYSICIAN GROUP | Age: 83
End: 2018-08-08
Payer: MEDICARE

## 2018-08-08 VITALS
HEART RATE: 82 BPM | TEMPERATURE: 96.7 F | WEIGHT: 143 LBS | HEIGHT: 60 IN | SYSTOLIC BLOOD PRESSURE: 102 MMHG | RESPIRATION RATE: 16 BRPM | OXYGEN SATURATION: 96 % | BODY MASS INDEX: 28.07 KG/M2 | DIASTOLIC BLOOD PRESSURE: 56 MMHG

## 2018-08-08 DIAGNOSIS — M25.511 ACUTE PAIN OF RIGHT SHOULDER: ICD-10-CM

## 2018-08-08 PROCEDURE — 73030 X-RAY EXAM OF SHOULDER: CPT | Mod: RT

## 2018-08-08 PROCEDURE — 99213 OFFICE O/P EST LOW 20 MIN: CPT | Performed by: EMERGENCY MEDICINE

## 2018-08-08 ASSESSMENT — ENCOUNTER SYMPTOMS
CHILLS: 0
WEAKNESS: 0
WEIGHT LOSS: 0
FALLS: 0
NECK PAIN: 0
MYALGIAS: 0
TINGLING: 0
VOMITING: 0
FEVER: 0
JOINT SWELLING: 1
COUGH: 0
DIZZINESS: 0
NUMBNESS: 0
SHORTNESS OF BREATH: 0
NAUSEA: 0
DIARRHEA: 0
ABDOMINAL PAIN: 0
CHANGE IN BOWEL HABIT: 0
HEADACHES: 0

## 2018-08-08 NOTE — PROGRESS NOTES
Subjective:   Marii Neal is a 83 y.o. female who presents for Shoulder Pain (R [id number] )        Shoulder Pain   This is a new problem. Episode onset: 3 weeks. The problem occurs constantly. The problem has been gradually worsening. Associated symptoms include joint swelling. Pertinent negatives include no abdominal pain, change in bowel habit, chills, coughing, fever, headaches, myalgias, nausea, neck pain, numbness, vomiting or weakness. Exacerbated by: Moving  Treatments tried: Voltaren. The treatment provided no relief.     Cannot recall an injury or fall, pt has dementia so daughter is unsure of historically. Notes increase discomfort. No previous injury or surgery. Denies pain with ipsilateral elbow or neck.     Review of Systems   Constitutional: Negative for chills, fever, malaise/fatigue and weight loss.   Respiratory: Negative for cough and shortness of breath.    Gastrointestinal: Negative for abdominal pain, change in bowel habit, diarrhea, nausea and vomiting.   Musculoskeletal: Positive for joint pain and joint swelling. Negative for falls, myalgias and neck pain.   Neurological: Negative for dizziness, tingling, weakness, numbness and headaches.   All other systems reviewed and are negative.  No knew changes in GI sx, normal for pt to have diarrhea and constipation intermittently.     PMH:  has a past medical history of Allergic rhinitis; Arthritis; ASCUS on Pap smear (5/8/09); DDD (degenerative disc disease); Diverticulitis; Fatty liver; H. pylori infection (3/17/08); Hyperlipidemia LDL goal < 100; Lumbar stenosis; Metabolic syndrome; Osteoporosis; Pancreatitis (2002); Peripheral neuropathy (HCC); Shingles; and Vitamin d deficiency. She also has no past medical history of Breast cancer (HCC) or Encounter for long-term (current) use of other medications.  MEDS:   Current Outpatient Prescriptions:   •  omeprazole (PRILOSEC) 20 MG delayed-release capsule, Take 1 Cap by mouth every  day., Disp: 30 Cap, Rfl: 5  •  PATADAY 0.2 % Solution, INSTILL ONE DROP INTO EACH EYE ONCE DAILY, Disp: 3 Bottle, Rfl: 3  •  levothyroxine (SYNTHROID) 25 MCG Tab, TAKE ONE TABLET BY MOUTH IN THE MORNING ON EMPTY STOMACH, Disp: 90 Tab, Rfl: 3  •  alendronate (FOSAMAX) 70 MG Tab, Take 1 Tab by mouth every 7 days., Disp: 12 Tab, Rfl: 3  •  escitalopram (LEXAPRO) 20 MG tablet, Take 1 Tab by mouth every day., Disp: 90 Tab, Rfl: 3  •  donepezil (ARICEPT) 10 MG tablet, TAKE ONE TABLET BY MOUTH ONCE DAILY, Disp: 90 Tab, Rfl: 1  •  docusate sodium (COLACE) 100 MG Cap, Take 100 mg by mouth every bedtime., Disp: , Rfl:   •  polyethylene glycol/lytes (MIRALAX) Pack, Take 17 g by mouth as needed., Disp: , Rfl:   •  memantine (NAMENDA) 5 MG Tab, TAKE ONE TABLET BY MOUTH TWICE DAILY, Disp: 180 Tab, Rfl: 3  •  atorvastatin (LIPITOR) 40 MG Tab, TAKE ONE TABLET BY MOUTH ONCE DAILY, Disp: 90 Tab, Rfl: 3  •  Diclofenac Sodium 1 % Gel, Apply 1 Application to affected area(s) 2 Times a Day., Disp: 1 Tube, Rfl: 3  •  Cholecalciferol (VITAMIN D PO), Take 1 Tab by mouth every day., Disp: , Rfl:   •  Omega-3 Fatty Acids (FISH OIL) 1000 MG Cap capsule, Take 2,000 mg by mouth every day., Disp: , Rfl:   •  CALCIUM PO, Take 1 Tab by mouth every day., Disp: , Rfl:   •  Glucos-Chondroit-Hyaluron-MSM (GLUCOSAMINE CHONDROITIN JOINT PO), Take 1 Tab by mouth every day., Disp: , Rfl:   •  montelukast (SINGULAIR) 10 MG Tab, Take 1 Tab by mouth every day., Disp: 30 Tab, Rfl: 30  •  sulfamethoxazole-trimethoprim (BACTRIM DS) 800-160 MG tablet, Take 1 Tab by mouth 2 times a day., Disp: 14 Tab, Rfl: 0  ALLERGIES:   Allergies   Allergen Reactions   • Etodolac Anaphylaxis     dizziness   • Lactose Diarrhea     intolerant     SURGHX:   Past Surgical History:   Procedure Laterality Date   • CHOLECYSTECTOMY     • LUMBAR DISC REPLACEMENT       SOCHX:  reports that she has never smoked. She has never used smokeless tobacco. She reports that she does not drink  alcohol or use drugs.  Family History   Problem Relation Age of Onset   • Family history unknown: Yes        Objective:   /56   Pulse 82   Temp 35.9 °C (96.7 °F)   Resp 16   Ht 1.524 m (5')   Wt 64.9 kg (143 lb)   SpO2 96%   BMI 27.93 kg/m²   Physical Exam   Constitutional: She is oriented to person, place, and time. She appears well-developed and well-nourished. No distress.   HENT:   Head: Normocephalic and atraumatic.   Eyes: Pupils are equal, round, and reactive to light. Conjunctivae are normal.   Cardiovascular: Normal rate.    Pulmonary/Chest: Effort normal and breath sounds normal.   Musculoskeletal:        Arms:  Neurological: She is alert and oriented to person, place, and time.   Skin: Skin is warm and dry.   Psychiatric: She has a normal mood and affect. Her behavior is normal.   Vitals reviewed.    XR R shoulder Impressions:  1.  No acute right shoulder fracture or dislocation.    2.  Progression of degenerative change involving the AC joint and proximal right humerus.  Assessment/Plan:     1. Acute pain of right shoulder  DX-SHOULDER 2+ RIGHT     No acute fracture seen on XR. Pt can continue RICE and OTC Ibuprofen/Naproxen as needed. Pt directed to f/u with PCP to consider physical therapy and or sports medicine referral.     Differential diagnosis, natural history, supportive care discussed. Follow-up with primary care provider within 7-10 days, emergency room precautions discussed.  Patient and/or family appears understanding of information.  In-person consultation and review, discussion of changes performed by me.  Poncho Umana MD

## 2018-08-13 DIAGNOSIS — F32.89 OTHER DEPRESSION: ICD-10-CM

## 2018-08-14 RX ORDER — ESCITALOPRAM OXALATE 20 MG/1
TABLET ORAL
Qty: 90 TAB | Refills: 3 | Status: SHIPPED | OUTPATIENT
Start: 2018-08-14 | End: 2019-04-03 | Stop reason: SDUPTHER

## 2018-08-16 DIAGNOSIS — M79.672 FOOT PAIN, BILATERAL: ICD-10-CM

## 2018-08-16 DIAGNOSIS — M79.671 FOOT PAIN, BILATERAL: ICD-10-CM

## 2018-08-24 ENCOUNTER — OFFICE VISIT (OUTPATIENT)
Dept: MEDICAL GROUP | Facility: PHYSICIAN GROUP | Age: 83
End: 2018-08-24
Payer: MEDICARE

## 2018-08-24 VITALS
BODY MASS INDEX: 28.07 KG/M2 | SYSTOLIC BLOOD PRESSURE: 122 MMHG | HEIGHT: 60 IN | HEART RATE: 94 BPM | WEIGHT: 143 LBS | TEMPERATURE: 97.2 F | DIASTOLIC BLOOD PRESSURE: 80 MMHG | OXYGEN SATURATION: 97 %

## 2018-08-24 DIAGNOSIS — M25.511 ACUTE PAIN OF RIGHT SHOULDER: ICD-10-CM

## 2018-08-24 DIAGNOSIS — R19.7 DIARRHEA OF PRESUMED INFECTIOUS ORIGIN: ICD-10-CM

## 2018-08-24 DIAGNOSIS — M19.011 ARTHRITIS OF RIGHT ACROMIOCLAVICULAR JOINT: ICD-10-CM

## 2018-08-24 PROCEDURE — 99214 OFFICE O/P EST MOD 30 MIN: CPT | Mod: 25 | Performed by: NURSE PRACTITIONER

## 2018-08-24 PROCEDURE — 20610 DRAIN/INJ JOINT/BURSA W/O US: CPT | Performed by: NURSE PRACTITIONER

## 2018-08-24 RX ORDER — TRIAMCINOLONE ACETONIDE 40 MG/ML
80 INJECTION, SUSPENSION INTRA-ARTICULAR; INTRAMUSCULAR ONCE
Status: COMPLETED | OUTPATIENT
Start: 2018-08-24 | End: 2018-08-24

## 2018-08-24 RX ADMIN — TRIAMCINOLONE ACETONIDE 80 MG: 40 INJECTION, SUSPENSION INTRA-ARTICULAR; INTRAMUSCULAR at 15:45

## 2018-08-24 NOTE — PROGRESS NOTES
Subjective:     Chief Complaint   Patient presents with   • Follow-Up     right shoulder pain   • Diarrhea     with burning and can not hold it        HPI  Marii Neal is a 83 y.o. female here today with her daughter for diarrhea and shoulder pain. Patient Romanian-speaking for daughter was interpreting for the visit.    Diarrhea  New problem for the past few months. Started while she was in [place] and has persisted. Got home in May. In [place] she was told she has infection and was given medication, but we don't know diagnosis or medication. Diarrhea is occurring daily, many times in a day. Has had incontinence because with the urgency she cannot make it to the bathroom in time. No nocturnal incontinence. Now she has developed burning with bowel movements. Denies any abd pain, N/V. No F/C, change in fatigue, or malaise.     Acute pain right shoulder  New problem that started about 5 weeks ago. The pain is posterior shoulder. States it is moderate-severe. Cannot lift arm above head or abduct shoulder. Evaluated in  8/8 and x-ray was positive for AC joint arthritis Enc RICE and NSAID and this has offered no improvement. Arm has no numbness or tingling.    Diagnoses of Diarrhea of presumed infectious origin, Acute pain of right shoulder, and Arthritis of right acromioclavicular joint were pertinent to this visit.    Allergies: Etodolac and Lactose  Current medicines (including changes today)  Current Outpatient Prescriptions   Medication Sig Dispense Refill   • Diclofenac Sodium 1 % Gel APPLY   TOPICALLY TWICE DAILY AS DIRECTED FOR  PAIN  IN  LEGS  AND  HANDS 300 g 3   • escitalopram (LEXAPRO) 20 MG tablet TAKE ONE TABLET BY MOUTH ONCE DAILY 90 Tab 3   • omeprazole (PRILOSEC) 20 MG delayed-release capsule Take 1 Cap by mouth every day. 30 Cap 5   • PATADAY 0.2 % Solution INSTILL ONE DROP INTO EACH EYE ONCE DAILY 3 Bottle 3   • levothyroxine (SYNTHROID) 25 MCG Tab TAKE ONE TABLET BY MOUTH IN THE MORNING  ON EMPTY STOMACH 90 Tab 3   • alendronate (FOSAMAX) 70 MG Tab Take 1 Tab by mouth every 7 days. 12 Tab 3   • donepezil (ARICEPT) 10 MG tablet TAKE ONE TABLET BY MOUTH ONCE DAILY 90 Tab 1   • docusate sodium (COLACE) 100 MG Cap Take 100 mg by mouth every bedtime.     • polyethylene glycol/lytes (MIRALAX) Pack Take 17 g by mouth as needed.     • memantine (NAMENDA) 5 MG Tab TAKE ONE TABLET BY MOUTH TWICE DAILY 180 Tab 3   • atorvastatin (LIPITOR) 40 MG Tab TAKE ONE TABLET BY MOUTH ONCE DAILY 90 Tab 3   • Diclofenac Sodium 1 % Gel Apply 1 Application to affected area(s) 2 Times a Day. 1 Tube 3   • Cholecalciferol (VITAMIN D PO) Take 1 Tab by mouth every day.     • Omega-3 Fatty Acids (FISH OIL) 1000 MG Cap capsule Take 2,000 mg by mouth every day.     • CALCIUM PO Take 1 Tab by mouth every day.     • Glucos-Chondroit-Hyaluron-MSM (GLUCOSAMINE CHONDROITIN JOINT PO) Take 1 Tab by mouth every day.     • montelukast (SINGULAIR) 10 MG Tab Take 1 Tab by mouth every day. 30 Tab 30     No current facility-administered medications for this visit.        She  has a past medical history of Allergic rhinitis; Arthritis; ASCUS on Pap smear (5/8/09); DDD (degenerative disc disease); Diverticulitis; Fatty liver; H. pylori infection (3/17/08); Hyperlipidemia LDL goal < 100; Lumbar stenosis; Metabolic syndrome; Osteoporosis; Pancreatitis (2002); Peripheral neuropathy (HCC); Shingles; and Vitamin d deficiency. She also has no past medical history of Breast cancer (HCC) or Encounter for long-term (current) use of other medications.      ROS  As stated in HPI and additionally  Gen: No F/C, fatigue, malaise  CV: No chest pain or LE edema  : No dysuria   GI: see HPI      Objective:     Blood pressure 122/80, pulse 94, temperature 36.2 °C (97.2 °F), height 1.524 m (5'), weight 64.9 kg (143 lb), SpO2 97 %. Body mass index is 27.93 kg/m².  Physical Exam:  General: Alert, oriented, in no acute distress.  Eye contact is good, speech goal  directed, affect calm  CNs grossly intact.  HEENT: conjunctiva non-injected, sclera non-icteric, EOMs intact. No lid edema or eye drainage.   Gross hearing intact.  Lungs: unlabored. clear to auscultation bilaterally with good excursion.  CV: regular rate and rhythm.  Abdomen: Soft, ND, mild TTP over epigastric region, otherwise non-tender. No hepatosplenomegaly. Bowel sounds active.   Right soulder: No swelling, deformity, atrophy, surgical scars, lesions, erythema. No deformity of clavicle, acromioclavicular joint, borders of scapula, or greater or lesser tuberosities of humerus. +TTP posterior shoulder over infraspinatus and before meals joint. No instability; limited range of motion through forward flexion and abduction. FAROM of shoulder joint through extension, internal/external rotation, and adduction.  Ext: no edema, normal color and temperature.   Skin: No rashes or lesions in visible areas  Gait steady.     PROCEDURE NOTE.  Discussed risk and benefit and patient agrees to kenalog injection of right shoulder.   The joint was prepped with betadine. A 25 guage needle was inserted into the right subacromial joint.   3 cc of 1% lidocaine without epi and 2 cc of kenalog 40mg/ ml was then injected into the joint space. The area was cleaned with alcohol swab and band-aid was applied. Patient tolerated procedure well with no complications    Assessment and Plan:   Assessment/Plan:  1. Diarrhea of presumed infectious origin  Assume this is an infection ongoing from while she was in [place]. She has already had blood work completed and everything was normal in this regard. We need to check stool studies for further evaluation.  - CDIFF BY PCR; Future  - CRYPTO/GIARDIA RAPID ASSAY; Future  - CULTURE STOOL; Future  - H.PYLORI STOOL ANTIGEN; Future  - COMPLETE O&P; Future    2. Acute pain of right shoulder  Ongoing problem. Due to patient's dementia I do not feel that physical therapy is going to be beneficial for her  because she will not be able to remember how to do or even to do any of the home exercises at home. Do not want to give her ongoing NSAIDs. Injection was given today. Tolerated well.  - triamcinolone acetonide (KENALOG-40) injection 80 mg; 2 mL by Intra-articular route Once.    3. Arthritis of right acromioclavicular joint  Same as #2  - triamcinolone acetonide (KENALOG-40) injection 80 mg; 2 mL by Intra-articular route Once.       Follow up:  Return in about 2 weeks (around 9/7/2018).    Educated in proper administration of medication(s) ordered today including safety, possible SE, risks, benefits, rationale and alternatives to therapy.   Supportive care, differential diagnoses, and indications for immediate follow-up discussed with patient.    Pathogenesis of diagnosis discussed including typical length and natural progression.    Instructed to return to clinic or nearest emergency department for any change in condition, further concerns, or worsening of symptoms.  Patient states understanding of the plan of care and discharge instructions.      Please note that this dictation was created using voice recognition software. I have made every reasonable attempt to correct obvious errors, but I expect that there are errors of grammar and possibly content that I did not discover before finalizing the note.    Followup: Return in about 2 weeks (around 9/7/2018). sooner should new symptoms or problems arise.

## 2018-08-27 ENCOUNTER — HOSPITAL ENCOUNTER (OUTPATIENT)
Facility: MEDICAL CENTER | Age: 83
End: 2018-08-27
Attending: NURSE PRACTITIONER
Payer: MEDICARE

## 2018-08-27 DIAGNOSIS — R19.7 DIARRHEA OF PRESUMED INFECTIOUS ORIGIN: ICD-10-CM

## 2018-08-27 LAB
C DIFF DNA SPEC QL NAA+PROBE: NEGATIVE
C DIFF TOX GENS STL QL NAA+PROBE: NEGATIVE
G LAMBLIA+C PARVUM AG STL QL RAPID: NORMAL
H PYLORI AG STL QL IA: NOT DETECTED
SIGNIFICANT IND 70042: NORMAL
SITE SITE: NORMAL
SOURCE SOURCE: NORMAL

## 2018-08-27 PROCEDURE — 87177 OVA AND PARASITES SMEARS: CPT

## 2018-08-27 PROCEDURE — 87329 GIARDIA AG IA: CPT

## 2018-08-27 PROCEDURE — 87328 CRYPTOSPORIDIUM AG IA: CPT

## 2018-08-27 PROCEDURE — 87338 HPYLORI STOOL AG IA: CPT

## 2018-08-27 PROCEDURE — 87899 AGENT NOS ASSAY W/OPTIC: CPT

## 2018-08-27 PROCEDURE — 87046 STOOL CULTR AEROBIC BACT EA: CPT

## 2018-08-27 PROCEDURE — 87045 FECES CULTURE AEROBIC BACT: CPT

## 2018-08-27 PROCEDURE — 87493 C DIFF AMPLIFIED PROBE: CPT

## 2018-08-28 LAB
E COLI SXT1+2 STL IA: NORMAL
SIGNIFICANT IND 70042: NORMAL
SITE SITE: NORMAL
SOURCE SOURCE: NORMAL

## 2018-08-29 LAB
O+P SPEC MICRO: NORMAL
SIGNIFICANT IND 70042: NORMAL
SITE SITE: NORMAL
SOURCE SOURCE: NORMAL

## 2018-08-30 ENCOUNTER — TELEPHONE (OUTPATIENT)
Dept: MEDICAL GROUP | Facility: PHYSICIAN GROUP | Age: 83
End: 2018-08-30

## 2018-08-30 LAB
BACTERIA STL CULT: NORMAL
E COLI SXT1+2 STL IA: NORMAL
SIGNIFICANT IND 70042: NORMAL
SITE SITE: NORMAL
SOURCE SOURCE: NORMAL

## 2018-10-17 ENCOUNTER — TELEPHONE (OUTPATIENT)
Dept: MEDICAL GROUP | Facility: MEDICAL CENTER | Age: 83
End: 2018-10-17

## 2018-10-17 NOTE — TELEPHONE ENCOUNTER
ESTABLISHED PATIENT PRE-VISIT PLANNING     Note: Patient will not be contacted if there is no indication to call.     1.  Reviewed notes from the last few office visits within the medical group: Yes    2.  If any orders were placed at last visit or intended to be done for this visit (i.e. 6 mos follow-up), do we have Results/Consult Notes?        •  Labs - Labs ordered, completed on 05/22/18 and results are in chart.   Note: If patient appointment is for lab review and patient did not complete labs, check with provider if OK to reschedule patient until labs completed.       •  Imaging - Imaging was not ordered at last office visit.       •  Referrals - No referrals were ordered at last office visit.    3. Is this appointment scheduled as a Hospital Follow-Up? No    4.  Immunizations were updated in [employer] using [employer]?: Yes       •  [employer] Recommendations: PREVNAR (PCV13) , TDAP and ZOSTAVAX (Shingles)    5.  Patient is due for the following Health Maintenance Topics:   Health Maintenance Due   Topic Date Due   • IMM ZOSTER VACCINES (1 of 2) 06/01/1986   • IMM DTaP/Tdap/Td Vaccine (1 - Tdap) 02/19/2014   • IMM PNEUMOCOCCAL 65+ (ADULT) LOW/MEDIUM RISK SERIES (2 of 2 - PCV13) 07/16/2016   • Annual Wellness Visit  08/07/2016   • IMM INFLUENZA (1) 09/01/2018       6.  MDX printed for Provider? NO    7.  Patient was NOT informed to arrive 15 min prior to their scheduled appointment and bring in their medication bottles.

## 2018-10-18 ENCOUNTER — OFFICE VISIT (OUTPATIENT)
Dept: MEDICAL GROUP | Facility: MEDICAL CENTER | Age: 83
End: 2018-10-18
Payer: MEDICARE

## 2018-10-18 ENCOUNTER — HOSPITAL ENCOUNTER (OUTPATIENT)
Dept: LAB | Facility: MEDICAL CENTER | Age: 83
End: 2018-10-18
Attending: NURSE PRACTITIONER
Payer: MEDICARE

## 2018-10-18 VITALS
WEIGHT: 145 LBS | OXYGEN SATURATION: 96 % | HEIGHT: 60 IN | TEMPERATURE: 97.5 F | HEART RATE: 82 BPM | DIASTOLIC BLOOD PRESSURE: 72 MMHG | SYSTOLIC BLOOD PRESSURE: 120 MMHG | BODY MASS INDEX: 28.47 KG/M2

## 2018-10-18 DIAGNOSIS — Z23 NEED FOR SHINGLES VACCINE: ICD-10-CM

## 2018-10-18 DIAGNOSIS — K59.00 CONSTIPATION, UNSPECIFIED CONSTIPATION TYPE: ICD-10-CM

## 2018-10-18 DIAGNOSIS — G89.29 CHRONIC LEFT-SIDED LOW BACK PAIN WITHOUT SCIATICA: ICD-10-CM

## 2018-10-18 DIAGNOSIS — M54.50 CHRONIC LEFT-SIDED LOW BACK PAIN WITHOUT SCIATICA: ICD-10-CM

## 2018-10-18 DIAGNOSIS — R19.7 DIARRHEA, UNSPECIFIED TYPE: ICD-10-CM

## 2018-10-18 DIAGNOSIS — F03.90 DEMENTIA WITHOUT BEHAVIORAL DISTURBANCE, UNSPECIFIED DEMENTIA TYPE: ICD-10-CM

## 2018-10-18 PROCEDURE — 36415 COLL VENOUS BLD VENIPUNCTURE: CPT

## 2018-10-18 PROCEDURE — 99214 OFFICE O/P EST MOD 30 MIN: CPT | Performed by: NURSE PRACTITIONER

## 2018-10-18 PROCEDURE — 86787 VARICELLA-ZOSTER ANTIBODY: CPT

## 2018-10-18 RX ORDER — MELOXICAM 7.5 MG/1
7.5 TABLET ORAL DAILY
Qty: 30 TAB | Refills: 2 | Status: SHIPPED | OUTPATIENT
Start: 2018-10-18 | End: 2018-12-21

## 2018-10-18 RX ORDER — MEMANTINE HYDROCHLORIDE 10 MG/1
10 TABLET ORAL 2 TIMES DAILY
Qty: 180 TAB | Refills: 1 | Status: SHIPPED | OUTPATIENT
Start: 2018-10-18 | End: 2019-01-02

## 2018-10-18 ASSESSMENT — PATIENT HEALTH QUESTIONNAIRE - PHQ9
CLINICAL INTERPRETATION OF PHQ2 SCORE: 6
5. POOR APPETITE OR OVEREATING: 3 - NEARLY EVERY DAY
SUM OF ALL RESPONSES TO PHQ QUESTIONS 1-9: 19

## 2018-10-18 NOTE — NON-PROVIDER
"  Subjective:     Marii Neal is a 83 y.o. female who presents with dementia.    HPI:   We are filling out Physician's Certificate with Needs Assessment paperwork.    Pt has dementia. Daughter Johanne does not think the donpezil and memtantine are helping with memory.    Reviewed labs with pt and daughter. Stool studies wnl.    She has severe left low back pain. She reports feeling \"swelling\" in that area.   She takes 1000mg ibuprofen most days without much relief. She has seen Dr. Meraz in ortho who says she has arthritis in her left hip. Her next appnt is in a few days, will discuss possible injection with him.    She needs shingle vaccine but is unsure if she had chickenpox in the past.      Patient Active Problem List    Diagnosis Date Noted   • Lumbar stenosis      Priority: Low   • DDD (degenerative disc disease)      Priority: Low   • Osteoporosis, senile 12/03/2009     Priority: Low   • Hyperlipidemia LDL goal <100 05/09/2017   • Arthritis    • Peripheral neuropathy 09/03/2015   • Vitamin d deficiency    • History of pancreatitis    • Allergic rhinitis 04/14/2011   • Metabolic syndrome    • Fatty liver    • Diverticulitis    • Preventative health care 04/15/2010   • Atypical squamous cells of undetermined significance (ASCUS) on Papanicolaou smear of cervix 05/08/2009   • H. pylori infection 03/17/2008       Current medicines (including changes today)  Current Outpatient Prescriptions   Medication Sig Dispense Refill   • LYRICA 100 MG Cap TAKE 1 CAPSULE BY MOUTH THREE TIMES DAILY FOR 90 DAYS 270 Cap 0   • montelukast (SINGULAIR) 10 MG Tab TAKE ONE TABLET BY MOUTH ONCE DAILY 90 Tab 3   • Diclofenac Sodium 1 % Gel APPLY   TOPICALLY TWICE DAILY AS DIRECTED FOR  PAIN  IN  LEGS  AND  HANDS 300 g 3   • escitalopram (LEXAPRO) 20 MG tablet TAKE ONE TABLET BY MOUTH ONCE DAILY 90 Tab 3   • omeprazole (PRILOSEC) 20 MG delayed-release capsule Take 1 Cap by mouth every day. 30 Cap 5   • PATADAY 0.2 % Solution " INSTILL ONE DROP INTO EACH EYE ONCE DAILY 3 Bottle 3   • levothyroxine (SYNTHROID) 25 MCG Tab TAKE ONE TABLET BY MOUTH IN THE MORNING ON EMPTY STOMACH 90 Tab 3   • alendronate (FOSAMAX) 70 MG Tab Take 1 Tab by mouth every 7 days. 12 Tab 3   • donepezil (ARICEPT) 10 MG tablet TAKE ONE TABLET BY MOUTH ONCE DAILY 90 Tab 1   • docusate sodium (COLACE) 100 MG Cap Take 100 mg by mouth every bedtime.     • polyethylene glycol/lytes (MIRALAX) Pack Take 17 g by mouth as needed.     • memantine (NAMENDA) 5 MG Tab TAKE ONE TABLET BY MOUTH TWICE DAILY 180 Tab 3   • atorvastatin (LIPITOR) 40 MG Tab TAKE ONE TABLET BY MOUTH ONCE DAILY 90 Tab 3   • Diclofenac Sodium 1 % Gel Apply 1 Application to affected area(s) 2 Times a Day. 1 Tube 3   • Cholecalciferol (VITAMIN D PO) Take 1 Tab by mouth every day.     • Omega-3 Fatty Acids (FISH OIL) 1000 MG Cap capsule Take 2,000 mg by mouth every day.     • CALCIUM PO Take 1 Tab by mouth every day.     • Glucos-Chondroit-Hyaluron-MSM (GLUCOSAMINE CHONDROITIN JOINT PO) Take 1 Tab by mouth every day.     • montelukast (SINGULAIR) 10 MG Tab Take 1 Tab by mouth every day. 30 Tab 30     No current facility-administered medications for this visit.        Allergies   Allergen Reactions   • Etodolac Anaphylaxis     dizziness   • Lactose Diarrhea     intolerant       ROS  Constitutional: Negative. Negative for fever, chills, weight loss, malaise/fatigue and diaphoresis.   HENT: Negative. Negative for hearing loss, ear pain, nosebleeds, congestion, sore throat, neck pain, tinnitus and ear discharge.   Respiratory: Negative. Negative for cough, hemoptysis, sputum production, shortness of breath, wheezing and stridor.   Cardiovascular: Negative. Negative for chest pain, palpitations, orthopnea, claudication, leg swelling and PND.   Gastrointestinal: Denies nausea, vomiting, heartburn, melena or hematochezia.  Genitourinary: Denies dysuria, hematuria, urinary incontinence, frequency or urgency.         Objective:     Blood pressure 120/72, pulse 82, temperature 36.4 °C (97.5 °F), temperature source Temporal, height 1.524 m (5'), weight 65.8 kg (145 lb), SpO2 96 %, not currently breastfeeding. Body mass index is 28.32 kg/m².    Physical Exam:  Vitals reviewed.  Constitutional: Well developed, well nourished, in no acute distress.   Cardiovascular: Normal rate, regular rhythm, normal heart sounds and intact distal pulses. Exam reveals no gallop and no friction rub. No murmur heard. No carotid bruits.   Pulmonary/Chest: Effort normal and breath sounds normal. No stridor. No respiratory distress. no wheezes or rales. exhibits no tenderness.   Musculoskeletal: Normal range of motion. exhibits no edema. bryn pedal pulses 2+.   Neurological: Alert. Exhibits normal muscle tone.  Skin: Skin is warm and dry. No diaphoresis.   Psychiatric: Normal mood and affect. Behavior is normal.      Assessment and Plan:     The following treatment plan was discussed:    1. Dementia without behavioral disturbance, unspecified dementia type  memantine (NAMENDA) 10 MG Tab    form completed for change of guardianship to Karmen Britt.  dementia worsening.  on aricept 10 mg and namenda 5 mg daily.  inc namenda 10 mg daily   2. Chronic left-sided low back pain without sciatica  meloxicam (MOBIC) 7.5 MG Tab    add mobic daily.  continue f/u with physiatry for treatment. dc ibuprofen   3. Constipation, unspecified constipation type      she was in er for this and diarrhea in august.  all stool test wnl.  all is stable.     4. Diarrhea, unspecified type     5. Need for shingles vaccine  VARICELLA ZOSTER IGG AB         Followup: Return in 3 months

## 2018-10-18 NOTE — PROGRESS NOTES
I have seen and examined the patient independently. All new labs and imaging studies were reviewed. Case was discussed in detail with the nurse practitioner student and plan of care formulated.  I agree with treatment plan.      1. Dementia without behavioral disturbance, unspecified dementia type  memantine (NAMENDA) 10 MG Tab    form completed for change of guardianship to Karmen Britt.  dementia worsening.  on aricept 10 mg and namenda 5 mg daily.  inc namenda 10 mg daily   2. Chronic left-sided low back pain without sciatica  meloxicam (MOBIC) 7.5 MG Tab    add mobic daily.  continue f/u with physiatry for treatment. dc ibuprofen   3. Constipation, unspecified constipation type      she was in er for this and diarrhea in august.  all stool test wnl.  all is stable.     4. Diarrhea, unspecified type     5. Need for shingles vaccine  VARICELLA ZOSTER IGG AB     6.  F/u 3 months.

## 2018-10-19 ENCOUNTER — TELEPHONE (OUTPATIENT)
Dept: MEDICAL GROUP | Facility: MEDICAL CENTER | Age: 83
End: 2018-10-19

## 2018-10-19 LAB — VZV IGG SER IA-ACNC: 2.87

## 2018-10-19 NOTE — TELEPHONE ENCOUNTER
Please let pt's family know that the varicella shows that she has had chicken pox.  She should get the new shingrix.

## 2018-11-03 ENCOUNTER — OFFICE VISIT (OUTPATIENT)
Dept: URGENT CARE | Facility: PHYSICIAN GROUP | Age: 83
End: 2018-11-03
Payer: MEDICARE

## 2018-11-03 ENCOUNTER — HOSPITAL ENCOUNTER (OUTPATIENT)
Dept: RADIOLOGY | Facility: MEDICAL CENTER | Age: 83
End: 2018-11-03
Attending: FAMILY MEDICINE
Payer: MEDICARE

## 2018-11-03 VITALS
HEART RATE: 74 BPM | RESPIRATION RATE: 14 BRPM | OXYGEN SATURATION: 95 % | SYSTOLIC BLOOD PRESSURE: 94 MMHG | BODY MASS INDEX: 28.32 KG/M2 | DIASTOLIC BLOOD PRESSURE: 58 MMHG | WEIGHT: 145 LBS | TEMPERATURE: 97.9 F

## 2018-11-03 DIAGNOSIS — S29.9XXA INJURY OF CHEST WALL, INITIAL ENCOUNTER: ICD-10-CM

## 2018-11-03 DIAGNOSIS — R06.02 SHORTNESS OF BREATH: ICD-10-CM
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
## 2018-11-03 PROCEDURE — 71046 X-RAY EXAM CHEST 2 VIEWS: CPT

## 2018-11-03 PROCEDURE — 99213 OFFICE O/P EST LOW 20 MIN: CPT | Performed by: FAMILY MEDICINE

## 2018-11-03 RX ORDER — ACETAMINOPHEN 500 MG
1000 TABLET ORAL ONCE
Status: COMPLETED | OUTPATIENT
Start: 2018-11-03 | End: 2018-11-03

## 2018-11-03 RX ADMIN — Medication 1000 MG: at 15:25

## 2018-11-03 ASSESSMENT — ENCOUNTER SYMPTOMS
PHOTOPHOBIA: 0
FLANK PAIN: 0
FOCAL WEAKNESS: 0
ABDOMINAL PAIN: 0
DOUBLE VISION: 0
SENSORY CHANGE: 0
PALPITATIONS: 0
ORTHOPNEA: 0
BLOOD IN STOOL: 0
BLURRED VISION: 0

## 2018-11-03 NOTE — PROGRESS NOTES
Subjective:      Marii Rausch is a 83 y.o. female who presents with Rib Injury (fell in bathroom yesterday,left rib,left breast,and lower back pain)            Onset yesterday GLF in bathroom. Pain in left ribs with associated SOB. No hemoptysis. Left rib pain is severe.   Low back pain is mild. Possibly hit back of head. No LOC. Mild HA. No neck pain. No myelopathy. No abrasion or laceration.  Pain is worse with deep inspiration and movement.  Has not tried any OTC medications.  No other aggravating or alleviating factors.          Review of Systems   HENT: Negative for nosebleeds.    Eyes: Negative for blurred vision, double vision and photophobia.   Cardiovascular: Negative for palpitations and orthopnea.   Gastrointestinal: Negative for abdominal pain and blood in stool.   Genitourinary: Negative for flank pain and hematuria.   Skin: Negative for itching and rash.   Neurological: Negative for sensory change and focal weakness.     .  Medications, Allergies, and current problem list reviewed today in Epic       Objective:     BP (!) 94/58 (BP Location: Right arm, Patient Position: Sitting)   Pulse 74   Temp 36.6 °C (97.9 °F)   Resp 14   Wt 65.8 kg (145 lb)   SpO2 95%   BMI 28.32 kg/m²      Physical Exam   Constitutional: She is oriented to person, place, and time. She appears well-developed and well-nourished. No distress.   HENT:   Head: Normocephalic and atraumatic.   Right Ear: External ear normal.   Left Ear: External ear normal.   Mouth/Throat: Oropharynx is clear and moist.   Eyes: Pupils are equal, round, and reactive to light. Conjunctivae and EOM are normal.   Neck: Normal range of motion. Neck supple.   No midline bony tenderness.  No axial load tenderness.   Cardiovascular: Normal rate, regular rhythm and normal heart sounds.    No murmur heard.  Pulmonary/Chest: Effort normal and breath sounds normal. She has no wheezes.       Neurological: She is alert and oriented to person, place,  and time.   Skin: Skin is warm and dry. No rash noted.               Assessment/Plan:   Chest x-ray per radiology:  No acute cardiopulmonary process is identified.    Atherosclerotic calcification is seen.        Pulse ox adequate  1. Fall, initial encounter     2. Injury of chest wall, initial encounter  DX-CHEST-2 VIEWS    acetaminophen (TYLENOL) tablet 1,000 mg   3. Shortness of breath  DX-CHEST-2 VIEWS     Differential diagnosis, natural history, supportive care, and indications for immediate follow-up discussed at length.

## 2018-11-04 DIAGNOSIS — M79.2 NEUROPATHIC PAIN: ICD-10-CM

## 2018-11-04 DIAGNOSIS — G62.89 OTHER POLYNEUROPATHY: ICD-10-CM

## 2018-11-14 ENCOUNTER — HOSPITAL ENCOUNTER (OUTPATIENT)
Dept: RADIOLOGY | Facility: MEDICAL CENTER | Age: 83
End: 2018-11-14
Attending: NURSE PRACTITIONER
Payer: MEDICARE

## 2018-11-14 DIAGNOSIS — Z12.39 SCREENING BREAST EXAMINATION: ICD-10-CM

## 2018-11-14 PROCEDURE — 77067 SCR MAMMO BI INCL CAD: CPT

## 2018-12-04 ENCOUNTER — OFFICE VISIT (OUTPATIENT)
Dept: MEDICAL GROUP | Facility: MEDICAL CENTER | Age: 83
End: 2018-12-04
Payer: MEDICARE

## 2018-12-04 VITALS
SYSTOLIC BLOOD PRESSURE: 106 MMHG | OXYGEN SATURATION: 96 % | HEART RATE: 87 BPM | TEMPERATURE: 98.9 F | HEIGHT: 60 IN | DIASTOLIC BLOOD PRESSURE: 58 MMHG | WEIGHT: 146 LBS | BODY MASS INDEX: 28.66 KG/M2

## 2018-12-04 DIAGNOSIS — M25.552 CHRONIC LEFT HIP PAIN: ICD-10-CM

## 2018-12-04 DIAGNOSIS — K59.00 CONSTIPATION, UNSPECIFIED CONSTIPATION TYPE: ICD-10-CM

## 2018-12-04 DIAGNOSIS — G89.29 CHRONIC LEFT HIP PAIN: ICD-10-CM

## 2018-12-04 PROCEDURE — 99214 OFFICE O/P EST MOD 30 MIN: CPT | Performed by: PHYSICIAN ASSISTANT

## 2018-12-05 NOTE — ASSESSMENT & PLAN NOTE
Has a chronic history of constipation.  Is currently on Linzess which at times causes diarrhea.  But because the Vicodin it is helpful.  She is on the 145 milligrams dosing.  Followed by GI.  She also has a history of being placed on omeprazole.  They state that she is never had heartburn or reflux.  They would like to discontinue the medication.

## 2018-12-05 NOTE — ASSESSMENT & PLAN NOTE
This is an 83-year-old female accompanied by her mother and granddaughter.  She has a chronic history of left hip pain.  Has seen Dr. Meraz in the past.  Is currently on hydrocodone for symptoms when needed.  Daughter is concerned about her pain.  She is wondering if she is a candidate for surgery.  She does have dementia.  She is on medication for that.  She has been given injections into the hip without any improvement.  She has significant osteoarthritis.

## 2018-12-05 NOTE — PROGRESS NOTES
Subjective:   Marii Rausch is a 83 y.o. female here today for chronic left hip pain and constipation with a history of possible reflux.    Chronic left hip pain  This is an 83-year-old female accompanied by her mother and granddaughter.  She has a chronic history of left hip pain.  Has seen Dr. Meraz in the past.  Is currently on hydrocodone for symptoms when needed.  Daughter is concerned about her pain.  She is wondering if she is a candidate for surgery.  She does have dementia.  She is on medication for that.  She has been given injections into the hip without any improvement.  She has significant osteoarthritis.    Constipation  Has a chronic history of constipation.  Is currently on Linzess which at times causes diarrhea.  But because the Vicodin it is helpful.  She is on the 145 milligrams dosing.  Followed by GI.  She also has a history of being placed on omeprazole.  They state that she is never had heartburn or reflux.  They would like to discontinue the medication.       Current medicines (including changes today)  Current Outpatient Prescriptions   Medication Sig Dispense Refill   • LYRICA 100 MG Cap TAKE 1 CAPSULE BY MOUTH THREE TIMES DAILY FOR 90 DAYS 90 Cap 0   • meloxicam (MOBIC) 7.5 MG Tab Take 1 Tab by mouth every day. 30 Tab 2   • memantine (NAMENDA) 10 MG Tab Take 1 Tab by mouth 2 times a day. 180 Tab 1   • montelukast (SINGULAIR) 10 MG Tab TAKE ONE TABLET BY MOUTH ONCE DAILY 90 Tab 3   • Diclofenac Sodium 1 % Gel APPLY   TOPICALLY TWICE DAILY AS DIRECTED FOR  PAIN  IN  LEGS  AND  HANDS 300 g 3   • escitalopram (LEXAPRO) 20 MG tablet TAKE ONE TABLET BY MOUTH ONCE DAILY 90 Tab 3   • PATADAY 0.2 % Solution INSTILL ONE DROP INTO EACH EYE ONCE DAILY 3 Bottle 3   • levothyroxine (SYNTHROID) 25 MCG Tab TAKE ONE TABLET BY MOUTH IN THE MORNING ON EMPTY STOMACH 90 Tab 3   • alendronate (FOSAMAX) 70 MG Tab Take 1 Tab by mouth every 7 days. 12 Tab 3   • donepezil (ARICEPT) 10 MG tablet TAKE ONE  TABLET BY MOUTH ONCE DAILY 90 Tab 1   • docusate sodium (COLACE) 100 MG Cap Take 100 mg by mouth every bedtime.     • polyethylene glycol/lytes (MIRALAX) Pack Take 17 g by mouth as needed.     • memantine (NAMENDA) 5 MG Tab TAKE ONE TABLET BY MOUTH TWICE DAILY 180 Tab 3   • atorvastatin (LIPITOR) 40 MG Tab TAKE ONE TABLET BY MOUTH ONCE DAILY 90 Tab 3   • Diclofenac Sodium 1 % Gel Apply 1 Application to affected area(s) 2 Times a Day. 1 Tube 3   • Cholecalciferol (VITAMIN D PO) Take 1 Tab by mouth every day.     • Omega-3 Fatty Acids (FISH OIL) 1000 MG Cap capsule Take 2,000 mg by mouth every day.     • CALCIUM PO Take 1 Tab by mouth every day.     • Glucos-Chondroit-Hyaluron-MSM (GLUCOSAMINE CHONDROITIN JOINT PO) Take 1 Tab by mouth every day.     • montelukast (SINGULAIR) 10 MG Tab Take 1 Tab by mouth every day. 30 Tab 30     No current facility-administered medications for this visit.      She  has a past medical history of Allergic rhinitis; Arthritis; ASCUS on Pap smear (5/8/09); DDD (degenerative disc disease); Diverticulitis; Fatty liver; H. pylori infection (3/17/08); Hyperlipidemia LDL goal < 100; Lumbar stenosis; Metabolic syndrome; Osteoporosis; Pancreatitis (2002); Peripheral neuropathy; Shingles; and Vitamin d deficiency. She also has no past medical history of Breast cancer (HCC) or Encounter for long-term (current) use of other medications.    Social History and Family History were reviewed and updated.    ROS   No chest pain, no shortness of breath, no abdominal pain and all other systems were reviewed and are negative.       Objective:     Blood pressure 106/58, pulse 87, temperature 37.2 °C (98.9 °F), height 1.524 m (5'), weight 66.2 kg (146 lb), SpO2 96 %, not currently breastfeeding. Body mass index is 28.51 kg/m².   Physical Exam:  Constitutional: Alert, no distress.  Skin: Warm, dry, good turgor, no rashes in visible areas.  Eye: Equal, round and reactive, conjunctiva clear, lids normal.  ENMT:  Lips without lesions, good dentition, oropharynx clear.  Neck: Trachea midline, no masses.   Lymph: No cervical or supraclavicular lymphadenopathy  Respiratory: Unlabored respiratory effort, lungs clear to auscultation, no wheezes, no ronchi.  Cardiovascular: Normal S1, S2, no murmur, no edema.  Musculoskeletal: Generalized buttocks and hip with back tenderness noted.    Psych: Alert and oriented x3, normal affect and mood.        Assessment and Plan:   The following treatment plan was discussed    1. Chronic left hip pain  Chronic condition.  Advised that she is not a candidate for surgery.  Referred to PT for evaluation.  May continue Vicodin as directed.  Follow-up with orthopedics as needed.  - REFERRAL TO PHYSICAL THERAPY Reason for Therapy: Eval/Treat/Report    2. Constipation, unspecified constipation type  Chronic condition.  Possibly cut tablets in half and take 1/2 tablet daily instead of a full tablet.  Also she may discontinue the PPI.  If patient develops symptoms they may return her to the medication.    Patient was seen for 25 minutes face to face of which > 50% of appointment time was spent on counseling and coordination of care regarding the above.    Followup: Return if symptoms worsen or fail to improve.    Please note that this dictation was created using voice recognition software. I have made every reasonable attempt to correct obvious errors, but I expect that there are errors of grammar and possibly content that I did not discover before finalizing the note.

## 2018-12-06 DIAGNOSIS — M79.2 NEUROPATHIC PAIN: ICD-10-CM

## 2018-12-06 DIAGNOSIS — G62.89 OTHER POLYNEUROPATHY: ICD-10-CM

## 2018-12-06 NOTE — TELEPHONE ENCOUNTER
Was the patient seen in the last year in this department? Yes LOV 10/18/18    Does patient have an active prescription for medications requested? No     Received Request Via: Pharmacy

## 2018-12-17 RX ORDER — ATORVASTATIN CALCIUM 40 MG/1
40 TABLET, FILM COATED ORAL DAILY
Qty: 90 TAB | Refills: 3 | Status: SHIPPED
Start: 2018-12-17 | End: 2020-01-19

## 2018-12-21 ENCOUNTER — OFFICE VISIT (OUTPATIENT)
Dept: URGENT CARE | Facility: PHYSICIAN GROUP | Age: 83
End: 2018-12-21
Payer: MEDICARE

## 2018-12-21 VITALS
OXYGEN SATURATION: 97 % | BODY MASS INDEX: 28.47 KG/M2 | RESPIRATION RATE: 14 BRPM | SYSTOLIC BLOOD PRESSURE: 110 MMHG | DIASTOLIC BLOOD PRESSURE: 50 MMHG | WEIGHT: 145 LBS | HEART RATE: 81 BPM | TEMPERATURE: 97.9 F | HEIGHT: 60 IN

## 2018-12-21 DIAGNOSIS — M62.838 MUSCLE SPASM: ICD-10-CM

## 2018-12-21 PROCEDURE — 99214 OFFICE O/P EST MOD 30 MIN: CPT | Performed by: FAMILY MEDICINE

## 2018-12-21 RX ORDER — MELOXICAM 7.5 MG/1
7.5 TABLET ORAL NIGHTLY PRN
Qty: 10 TAB | Refills: 0 | Status: SHIPPED | OUTPATIENT
Start: 2018-12-21 | End: 2019-01-02 | Stop reason: SDUPTHER

## 2018-12-21 RX ORDER — HYDROCODONE BITARTRATE AND ACETAMINOPHEN 10; 325 MG/1; MG/1
1-2 TABLET ORAL EVERY 6 HOURS PRN
COMMUNITY
End: 2019-01-02 | Stop reason: SDUPTHER

## 2018-12-21 RX ORDER — BACLOFEN 10 MG/1
10 TABLET ORAL NIGHTLY PRN
Qty: 10 TAB | Refills: 0 | Status: SHIPPED | OUTPATIENT
Start: 2018-12-21 | End: 2019-01-02 | Stop reason: SDUPTHER

## 2018-12-21 ASSESSMENT — ENCOUNTER SYMPTOMS: BACK PAIN: 1

## 2018-12-21 NOTE — PROGRESS NOTES
Subjective:   Marii Rausch is a 83 y.o. female who presents for Back Pain (swollen buttcheek   )        Back Pain   This is a new problem. The current episode started in the past 7 days. The problem occurs intermittently. The problem has been waxing and waning since onset. The pain is present in the gluteal. The quality of the pain is described as aching.     Review of Systems   Unable to perform ROS: Dementia   Musculoskeletal: Positive for back pain.     Allergies   Allergen Reactions   • Etodolac Anaphylaxis     dizziness   • Lactose Diarrhea     intolerant      Objective:   /50   Pulse 81   Temp 36.6 °C (97.9 °F) (Temporal)   Resp 14   Ht 1.524 m (5')   Wt 65.8 kg (145 lb)   SpO2 97%   BMI 28.32 kg/m²   Physical Exam   Constitutional: She is oriented to person, place, and time. She appears well-developed and well-nourished. No distress.   HENT:   Head: Normocephalic and atraumatic.   Eyes: Pupils are equal, round, and reactive to light. Conjunctivae and EOM are normal.   Cardiovascular: Normal rate and regular rhythm.    No murmur heard.  Pulmonary/Chest: Effort normal and breath sounds normal. No respiratory distress.   Abdominal: Soft. She exhibits no distension. There is no tenderness.   Musculoskeletal:   Left gluteal spasm noted   Neurological: She is alert and oriented to person, place, and time. She has normal reflexes. No sensory deficit.   Skin: Skin is warm and dry.   Psychiatric: She has a normal mood and affect.         Assessment/Plan:   1. Muscle spasm  - baclofen (LIORESAL) 10 MG Tab; Take 1 Tab by mouth at bedtime as needed.  Dispense: 10 Tab; Refill: 0  - meloxicam (MOBIC) 7.5 MG Tab; Take 1 Tab by mouth at bedtime as needed.  Dispense: 10 Tab; Refill: 0    Other orders  - HYDROcodone/acetaminophen (NORCO)  MG Tab; Take 1-2 Tabs by mouth every 6 hours as needed.    Differential diagnosis, natural history, supportive care, and indications for immediate follow-up  discussed.

## 2019-01-02 ENCOUNTER — OFFICE VISIT (OUTPATIENT)
Dept: MEDICAL GROUP | Facility: MEDICAL CENTER | Age: 84
End: 2019-01-02
Payer: MEDICARE

## 2019-01-02 ENCOUNTER — HOSPITAL ENCOUNTER (OUTPATIENT)
Dept: LAB | Facility: MEDICAL CENTER | Age: 84
End: 2019-01-02
Attending: NURSE PRACTITIONER
Payer: MEDICARE

## 2019-01-02 VITALS
BODY MASS INDEX: 29.29 KG/M2 | WEIGHT: 150 LBS | RESPIRATION RATE: 16 BRPM | HEART RATE: 99 BPM | DIASTOLIC BLOOD PRESSURE: 68 MMHG | SYSTOLIC BLOOD PRESSURE: 128 MMHG | OXYGEN SATURATION: 95 % | TEMPERATURE: 97.9 F

## 2019-01-02 DIAGNOSIS — M25.552 CHRONIC LEFT HIP PAIN: ICD-10-CM

## 2019-01-02 DIAGNOSIS — R73.01 IFG (IMPAIRED FASTING GLUCOSE): ICD-10-CM

## 2019-01-02 DIAGNOSIS — Z79.899 HIGH RISK MEDICATION USE: ICD-10-CM

## 2019-01-02 DIAGNOSIS — Z79.891 CHRONIC USE OF OPIATE DRUGS THERAPEUTIC PURPOSES: ICD-10-CM

## 2019-01-02 DIAGNOSIS — M62.838 MUSCLE SPASM: ICD-10-CM

## 2019-01-02 DIAGNOSIS — R09.89 CHEST CRACKLES: ICD-10-CM

## 2019-01-02 DIAGNOSIS — M12.9 ARTHRITIS, MULTIPLE JOINT INVOLVEMENT: ICD-10-CM

## 2019-01-02 DIAGNOSIS — G89.29 CHRONIC LEFT HIP PAIN: ICD-10-CM

## 2019-01-02 LAB
ANION GAP SERPL CALC-SCNC: 8 MMOL/L (ref 0–11.9)
BUN SERPL-MCNC: 18 MG/DL (ref 8–22)
CALCIUM SERPL-MCNC: 9.2 MG/DL (ref 8.5–10.5)
CHLORIDE SERPL-SCNC: 105 MMOL/L (ref 96–112)
CO2 SERPL-SCNC: 28 MMOL/L (ref 20–33)
CREAT SERPL-MCNC: 0.78 MG/DL (ref 0.5–1.4)
EST. AVERAGE GLUCOSE BLD GHB EST-MCNC: 131 MG/DL
FASTING STATUS PATIENT QL REPORTED: NORMAL
GLUCOSE SERPL-MCNC: 97 MG/DL (ref 65–99)
HBA1C MFR BLD: 6.2 % (ref 0–5.6)
POTASSIUM SERPL-SCNC: 4.1 MMOL/L (ref 3.6–5.5)
SODIUM SERPL-SCNC: 141 MMOL/L (ref 135–145)

## 2019-01-02 PROCEDURE — 80048 BASIC METABOLIC PNL TOTAL CA: CPT

## 2019-01-02 PROCEDURE — 36415 COLL VENOUS BLD VENIPUNCTURE: CPT

## 2019-01-02 PROCEDURE — 99214 OFFICE O/P EST MOD 30 MIN: CPT | Performed by: NURSE PRACTITIONER

## 2019-01-02 PROCEDURE — 83036 HEMOGLOBIN GLYCOSYLATED A1C: CPT | Mod: GA

## 2019-01-02 RX ORDER — BACLOFEN 10 MG/1
10 TABLET ORAL NIGHTLY PRN
Qty: 30 TAB | Refills: 3 | Status: SHIPPED | OUTPATIENT
Start: 2019-01-02 | End: 2019-03-21 | Stop reason: SDUPTHER

## 2019-01-02 RX ORDER — HYDROCODONE BITARTRATE AND ACETAMINOPHEN 10; 325 MG/1; MG/1
1-2 TABLET ORAL EVERY 6 HOURS PRN
Qty: 30 TAB | Refills: 0 | Status: SHIPPED | OUTPATIENT
Start: 2019-02-02 | End: 2019-03-21 | Stop reason: SDUPTHER

## 2019-01-02 RX ORDER — HYDROCODONE BITARTRATE AND ACETAMINOPHEN 10; 325 MG/1; MG/1
1-2 TABLET ORAL EVERY 6 HOURS PRN
Qty: 30 TAB | Refills: 0 | Status: SHIPPED | OUTPATIENT
Start: 2019-02-02 | End: 2019-01-02 | Stop reason: SDUPTHER

## 2019-01-02 RX ORDER — MELOXICAM 7.5 MG/1
7.5 TABLET ORAL DAILY
Qty: 30 TAB | Refills: 3 | Status: SHIPPED | OUTPATIENT
Start: 2019-01-02 | End: 2019-03-21 | Stop reason: SDUPTHER

## 2019-01-02 RX ORDER — HYDROCODONE BITARTRATE AND ACETAMINOPHEN 10; 325 MG/1; MG/1
1-2 TABLET ORAL EVERY 6 HOURS PRN
Qty: 30 TAB | Refills: 0 | Status: SHIPPED | OUTPATIENT
Start: 2019-01-02 | End: 2019-01-02 | Stop reason: SDUPTHER

## 2019-01-02 NOTE — PROGRESS NOTES
Subjective:     Marii Rausch is a 83 y.o. female who presents with left hip and gluteal pain.    HPI:   Seen in in f/u for left gluteal.  She has been seen in UC twice for left hip and buttock pain.  Starting PT.  Injections in hip in past not helped.  She has been given norco for break through pain.  Also using baclofen and mobic wiht some relief.  Xray in past shows OA in both spine and hip.  There is no numbness in her left leg.  Needs refills on meds.    Her last a1c in 5/18 was 5.9.  Not on meds.  Not on low carb diet.      Patient Active Problem List    Diagnosis Date Noted   • Lumbar stenosis      Priority: Low   • DDD (degenerative disc disease)      Priority: Low   • Osteoporosis, senile 12/03/2009     Priority: Low   • Chronic left hip pain 12/04/2018   • Constipation 12/04/2018   • Hyperlipidemia LDL goal <100 05/09/2017   • Arthritis    • Peripheral neuropathy 09/03/2015   • Vitamin d deficiency    • History of pancreatitis    • Allergic rhinitis 04/14/2011   • Metabolic syndrome    • Fatty liver    • Diverticulitis    • Preventative health care 04/15/2010   • Atypical squamous cells of undetermined significance (ASCUS) on Papanicolaou smear of cervix 05/08/2009   • H. pylori infection 03/17/2008       Current medicines (including changes today)  Current Outpatient Prescriptions   Medication Sig Dispense Refill   • meloxicam (MOBIC) 7.5 MG Tab Take 1 Tab by mouth every day. 30 Tab 3   • baclofen (LIORESAL) 10 MG Tab Take 1 Tab by mouth at bedtime as needed. 30 Tab 3   • [START ON 2/2/2019] HYDROcodone/acetaminophen (NORCO)  MG Tab Take 1-2 Tabs by mouth every 6 hours as needed for up to 30 days. 30 Tab 0   • atorvastatin (LIPITOR) 40 MG Tab Take 1 Tab by mouth every day. 90 Tab 3   • LYRICA 100 MG Cap TAKE 1 CAPSULE BY MOUTH THREE TIMES DAILY FOR 90 DAYS 270 Cap 3   • escitalopram (LEXAPRO) 20 MG tablet TAKE ONE TABLET BY MOUTH ONCE DAILY 90 Tab 3   • PATADAY 0.2 % Solution INSTILL  ONE DROP INTO EACH EYE ONCE DAILY 3 Bottle 3   • levothyroxine (SYNTHROID) 25 MCG Tab TAKE ONE TABLET BY MOUTH IN THE MORNING ON EMPTY STOMACH 90 Tab 3   • alendronate (FOSAMAX) 70 MG Tab Take 1 Tab by mouth every 7 days. 12 Tab 3   • donepezil (ARICEPT) 10 MG tablet TAKE ONE TABLET BY MOUTH ONCE DAILY 90 Tab 1   • docusate sodium (COLACE) 100 MG Cap Take 100 mg by mouth every bedtime.     • polyethylene glycol/lytes (MIRALAX) Pack Take 17 g by mouth as needed.     • memantine (NAMENDA) 5 MG Tab TAKE ONE TABLET BY MOUTH TWICE DAILY 180 Tab 3   • Diclofenac Sodium 1 % Gel Apply 1 Application to affected area(s) 2 Times a Day. 1 Tube 3   • Cholecalciferol (VITAMIN D PO) Take 1 Tab by mouth every day.     • Omega-3 Fatty Acids (FISH OIL) 1000 MG Cap capsule Take 2,000 mg by mouth every day.     • CALCIUM PO Take 1 Tab by mouth every day.     • Glucos-Chondroit-Hyaluron-MSM (GLUCOSAMINE CHONDROITIN JOINT PO) Take 1 Tab by mouth every day.     • montelukast (SINGULAIR) 10 MG Tab Take 1 Tab by mouth every day. 30 Tab 30     No current facility-administered medications for this visit.        Allergies   Allergen Reactions   • Etodolac Anaphylaxis     dizziness   • Lactose Diarrhea     intolerant       ROS  Constitutional: Negative. Negative for fever, chills, weight loss, malaise/fatigue and diaphoresis.   HENT: Negative. Negative for hearing loss, ear pain, nosebleeds, congestion, sore throat, neck pain, tinnitus and ear discharge.   Respiratory: Negative. Negative for cough, hemoptysis, sputum production, shortness of breath, wheezing and stridor.   Cardiovascular: Negative. Negative for chest pain, palpitations, orthopnea, claudication, leg swelling and PND.   Gastrointestinal: Denies nausea, vomiting, diarrhea, constipation, heartburn, melena or hematochezia.  Genitourinary: Denies dysuria, hematuria, urinary incontinence, frequency or urgency.        Objective:     Blood pressure 128/68, pulse 99, temperature 36.6 °C  (97.9 °F), temperature source Temporal, resp. rate 16, weight 68 kg (150 lb), SpO2 95 %, not currently breastfeeding. Body mass index is 29.29 kg/m².    Physical Exam:  Vitals reviewed.  Constitutional:  appears well-developed and well-nourished. No distress.   Cardiovascular: Normal rate, regular rhythm, normal heart sounds and intact distal pulses. Exam reveals no gallop and no friction rub. No murmur heard. No carotid bruits.   Pulmonary/Chest: Effort normal. No stridor. No respiratory distress. no wheezes or rales. exhibits no tenderness.  RLL crackles  Musculoskeletal: amb with walker  Slowly. Pain with palp left hip  Neurological:  exhibits normal muscle tone.  Skin: Skin is warm and dry. No diaphoresis.   Psychiatric: Normal mood and affect. Behavior is normal.      Assessment and Plan:     The following treatment plan was discussed:    1. Muscle spasm  meloxicam (MOBIC) 7.5 MG Tab    baclofen (LIORESAL) 10 MG Tab    Controlled Substance Treatment Agreement    [facility] PAIN MANAGEMENT SCREEN    Consent for Opiate Prescription    HYDROcodone/acetaminophen (NORCO)  MG Tab    DISCONTINUED: HYDROcodone/acetaminophen (NORCO)  MG Tab    DISCONTINUED: HYDROcodone/acetaminophen (NORCO)  MG Tab    pain in left hip continues.  UDS and contract doen today.  contniue mobic, bacloofen and norco.  f/u 3 months for med refill   2. Chronic left hip pain  meloxicam (MOBIC) 7.5 MG Tab    baclofen (LIORESAL) 10 MG Tab    Controlled Substance Treatment Agreement    [facility] PAIN MANAGEMENT SCREEN    Consent for Opiate Prescription    HYDROcodone/acetaminophen (NORCO)  MG Tab    DISCONTINUED: HYDROcodone/acetaminophen (NORCO)  MG Tab    DISCONTINUED: HYDROcodone/acetaminophen (NORCO)  MG Tab    check BMP for renal fx for mobic.  take mobic in am and baclofen in pm   3. Arthritis, multiple joint involvement  meloxicam (MOBIC) 7.5 MG Tab    baclofen (LIORESAL) 10 MG Tab    Controlled  Substance Treatment Agreement    [facility] PAIN MANAGEMENT SCREEN    Consent for Opiate Prescription    HYDROcodone/acetaminophen (NORCO)  MG Tab    DISCONTINUED: HYDROcodone/acetaminophen (NORCO)  MG Tab    DISCONTINUED: HYDROcodone/acetaminophen (NORCO)  MG Tab   4. Chronic use of opiate drugs therapeutic purposes  meloxicam (MOBIC) 7.5 MG Tab    baclofen (LIORESAL) 10 MG Tab    Controlled Substance Treatment Agreement    [facility] PAIN MANAGEMENT SCREEN    Consent for Opiate Prescription    HYDROcodone/acetaminophen (NORCO)  MG Tab    DISCONTINUED: HYDROcodone/acetaminophen (NORCO)  MG Tab    DISCONTINUED: HYDROcodone/acetaminophen (NORCO)  MG Tab   5. Chest crackles  meloxicam (MOBIC) 7.5 MG Tab    baclofen (LIORESAL) 10 MG Tab    Controlled Substance Treatment Agreement    [facility] PAIN MANAGEMENT SCREEN    Consent for Opiate Prescription    HYDROcodone/acetaminophen (NORCO)  MG Tab    DISCONTINUED: HYDROcodone/acetaminophen (NORCO)  MG Tab    DISCONTINUED: HYDROcodone/acetaminophen (NORCO)  MG Tab    RLL crackles.  enc deep breathing and coughing   6. IFG (impaired fasting glucose)  BASIC METABOLIC PANEL    HEMOGLOBIN A1C    recheck a1c.  f/u with family with results.    7. High risk medication use  BASIC METABOLIC PANEL         Followup: Return in about 3 months (around 4/2/2019).

## 2019-01-05 ENCOUNTER — TELEPHONE (OUTPATIENT)
Dept: MEDICAL GROUP | Facility: MEDICAL CENTER | Age: 84
End: 2019-01-05

## 2019-01-21 ENCOUNTER — PHYSICAL THERAPY (OUTPATIENT)
Dept: PHYSICAL THERAPY | Facility: REHABILITATION | Age: 84
End: 2019-01-21
Attending: PHYSICIAN ASSISTANT
Payer: MEDICARE

## 2019-01-21 DIAGNOSIS — M25.552 CHRONIC LEFT HIP PAIN: ICD-10-CM

## 2019-01-21 DIAGNOSIS — G89.29 CHRONIC LEFT HIP PAIN: ICD-10-CM

## 2019-01-21 PROCEDURE — 97110 THERAPEUTIC EXERCISES: CPT

## 2019-01-21 PROCEDURE — 97161 PT EVAL LOW COMPLEX 20 MIN: CPT

## 2019-01-21 ASSESSMENT — ENCOUNTER SYMPTOMS
PAIN SCALE AT LOWEST: 7
PAIN SCALE AT HIGHEST: 10
PAIN SCALE: 8

## 2019-01-21 ASSESSMENT — ACTIVITIES OF DAILY LIVING (ADL): POOR_BALANCE: 1

## 2019-01-21 NOTE — OP THERAPY EVALUATION
Outpatient Physical Therapy  INITIAL EVALUATION    [facility]  [address]  [facility] [address]  Phone:  [phone]  Fax:  [phone]    Date of Evaluation: 2019    Patient: Marii Rausch  YOB: 1935  MRN: [id number]     Referring Provider: Niktia Ventura P.A.-C.  [id number] [facility]  [address]  [place] [address]   Referring Diagnosis Chronic left hip pain [M25.552, G89.29]     Time Calculation  Start time: 930  Stop time: 1030 Time Calculation (min): 60 minutes     Physical Therapy Occurrence Codes    Date of onset of impairment:  18   Date physical therapy care plan established or reviewed:  19   Date physical therapy treatment started:  19          Chief Complaint: Hip Problem    Visit Diagnoses     ICD-10-CM   1. Chronic left hip pain M25.552    G89.29         Subjective:   History of Present Illness:     Date of onset:  2018  Prior level of function:  Ongoing hip pain for years  Pain:     Current pain ratin    At best pain ratin    At worst pain rating:  10  Diagnostic Tests:     X-ray: abnormal    Activities of Daily Living:     Patient reported ADL status: Difficulty with ambulation  Difficulty all all mobility  Needs assistance most of the time  Patient Goals:     Patient goals for therapy:  Increased strength, decreased pain and increased motion    Patient is a 83 y.o. female that presents to therapy with left hip pain and numbness down the leg. States that symptoms were insidious in onset. Reports the pain quality to be sharp/dull,constant and are primarily in the left leg. Reports that symptoms now worsening. States that aggravating factors are walking, am.  States that easng factors are medication. Notes minor bowel / bladder issues and glut numbness.   Past Medical History:   Diagnosis Date   • Allergic rhinitis    • Arthritis     hand pain and numbness   • ASCUS on Pap smear 09   • DDD  (degenerative disc disease)    • Diverticulitis    • Fatty liver    • H. pylori infection 3/17/08    treated with prevpac   • Hyperlipidemia LDL goal < 100    • Lumbar stenosis     MRI lumbar spine 2011 showed multilevel OA and multiple bulging with stenosis   • Metabolic syndrome    • Osteoporosis    • Pancreatitis 2002   • Peripheral neuropathy    • Shingles    • Vitamin d deficiency      Past Surgical History:   Procedure Laterality Date   • CHOLECYSTECTOMY     • LUMBAR DISC REPLACEMENT       Social History   Substance Use Topics   • Smoking status: Never Smoker   • Smokeless tobacco: Never Used   • Alcohol use No      Comment: Rarely     Family and Occupational History     Social History   • Marital status:      Spouse name: N/A   • Number of children: N/A   • Years of education: N/A       Objective     Postural Observations  Seated posture: poor  Standing posture: poor  Correction of posture: has no consistent effect        Lumbar Screen  Lumbar range of motion within normal limits with the following exceptions:Limited lumbar ROM  Patient could not tolerate supine position due to LBP    Neurological Testing     Reflexes   Left   Patellar (L4): trace (1+)  Achilles (S1): trace (1+)  Ankle clonus reflex: negative  Babinski sign: negative    Right   Patellar (L4): trace (1+)  Achilles (S1): trace (1+)  Ankle clonus reflex: negative  Babinski sign: negative    Myotome testing   Lumbar (left)   L1 (hip flexors): 4+  L2 (hip flexors): 4+  L3 (knee extensors): 3+  L4 (ankle dorsiflexors): 3  L5 (great toe extension): 3+  S1 (ankle plantar flexors): 2    Lumbar (right)   L1 (hip flexors): 4+  L2 (hip flexors): 4+  L3 (knee extensors): 4-  L4 (ankle dorsiflexors): 3  L5 (great toe extension): 3-  S1 (ankle plantar flexors): 3    Additional Neurological Details  Notes that sensory changes are present but due to the family reported dementia she is confused with sensation testing    Palpation   Left   Tenderness of  the gluteus medius and quadratus lumborum.     Right   Tenderness of the gluteus medius and quadratus lumborum.     Passive Range of Motion   Left Hip   Flexion: 95 degrees   External rotation (90/90): 10 degrees   Internal rotation (90/90): 10 degrees     Right Hip   Flexion: 110 degrees   External rotation (90/90): 23 degrees   Internal rotation (90/90): 8 degrees     Joint Play   Left Hip     Posterior hip: hypomobile    Anterior hip: hypomobile    Lateral hip: hypomobile    Right Hip     Posterior hip: hypomobile    Anterior hip: hypomobile    Lateral hip: hypomobile    Strength:      Left Hip   Planes of Motion   Abduction: 3-  Adduction: 3+    Right Hip   Planes of Motion   Abduction: 3-  Adduction: 3+    Left Knee   Flexion: 3-    Right Knee   Flexion: 3-    Tests     Additional Tests Details  Formal testing held due to pain with supine position    General Comments     Hip Comments   LOB with static standing        Therapeutic Exercises (CPT 82951):     1. Seated marching, x10    2. Seated hip abd L2, x20    3. Sit to stand , x5      Time-based treatments/modalities:  Therapeutic exercise minutes (CPT 37672): 10 minutes       Assessment, Response and Plan:   Impairments: abnormal or restricted ROM, activity intolerance, impaired balance, impaired physical strength and pain with function    Assessment details:  Patient presents with signs and symptoms consistent with hip OA and chronic lower back pain. Patient limitations include weakness, decreased ROM, and pain. Patient demonstrated very low activity tolerance due to pain. Patient will benefit from skilled therapy to improve the aforementioned deficits and decrease further functional decline. If symptoms do not improve trial water therapy.   Prognosis: poor    Goals:   Short Term Goals:   1) Patient's hip abd strength will improve by a half muscle grade to facilitate improved transfer ability.  2) Patient's symptoms will improve to facilitate improved sit to  stand transfers with one person assist.  Short term goal time span:  4-6 weeks      Long Term Goals:    1) Patient will be able to sit to stand without assistance to allow for improved independence.  2) Patient's LEFS will improve by 10 to demonstrate functional improvement  Long term goal time span:  6-8 weeks    Plan:   Therapy options:  Physical therapy treatment to continue  Planned therapy interventions:  E Stim Unattended (CPT 14942), Hot or Cold Pack Therapy (CPT 16517), Manual Therapy (CPT 61013), Neuromuscular Re-education (CPT 08038) and Therapeutic Exercise (CPT 82577)  Frequency:  2x week  Duration in weeks:  6  Discussed with:  Patient      Functional Limitations and Severity Modifiers  PT Functional Assessment Tool Used: LEFS  PT Functional Assessment Score: 5     Referring provider co-signature:  I have reviewed this plan of care and my co-signature certifies the need for services.  Certification Dates:   From 1/21/19     To 3/4/19    Physician Signature: ________________________________ Date: ______________

## 2019-01-22 ENCOUNTER — APPOINTMENT (OUTPATIENT)
Dept: PHYSICAL THERAPY | Facility: REHABILITATION | Age: 84
End: 2019-01-22
Payer: MEDICARE

## 2019-01-23 ENCOUNTER — PHYSICAL THERAPY (OUTPATIENT)
Dept: PHYSICAL THERAPY | Facility: REHABILITATION | Age: 84
End: 2019-01-23
Attending: PHYSICIAN ASSISTANT
Payer: MEDICARE

## 2019-01-23 DIAGNOSIS — G89.29 CHRONIC LEFT HIP PAIN: ICD-10-CM

## 2019-01-23 DIAGNOSIS — M25.552 CHRONIC LEFT HIP PAIN: ICD-10-CM

## 2019-01-23 PROCEDURE — 97110 THERAPEUTIC EXERCISES: CPT

## 2019-01-24 ENCOUNTER — APPOINTMENT (OUTPATIENT)
Dept: PHYSICAL THERAPY | Facility: REHABILITATION | Age: 84
End: 2019-01-24
Attending: PHYSICIAN ASSISTANT
Payer: MEDICARE

## 2019-01-24 RX ORDER — ALENDRONATE SODIUM 70 MG/1
70 TABLET ORAL
Qty: 12 TAB | Refills: 3 | Status: SHIPPED | OUTPATIENT
Start: 2019-01-24

## 2019-01-24 NOTE — OP THERAPY DAILY TREATMENT
Outpatient Physical Therapy  DAILY TREATMENT     [facility] [place]  [address], [address]  [facility] [address]  Phone:  [phone]  Fax:  [phone]    Date: 01/23/2019    Patient: Marii Rausch  YOB: 1935  MRN: [id number]     Time Calculation  Start time: 1430  Stop time: 1453 Time Calculation (min): 23 minutes     Chief Complaint: Hip Problem    Visit #: 2    SUBJECTIVE:  Patient reports no change in symptoms. Patient's family notes that she did not want to participate in her home program.     OBJECTIVE:  Current objective measures:   Continued pain with weight bearing           Therapeutic Exercises (CPT 78829):     1. UBE, 2x3min    2. Seated hip abd with band, 2x15    3. Seated hip add with ball, x25    4. Seated FAQ, x15    5. Seated HS curls, x15    6. Sit to stand, x5m      Time-based treatments/modalities:  Therapeutic exercise minutes (CPT 97859): 23 minutes       Pain rating before treatment: 8  Pain rating after treatment: 7    ASSESSMENT:   Response to treatment: Patient demonstrated fair tolerance of program today. Patient will continue with a gradual increase in a hip strengthening program. If patient fails to respond consider water therapy first.     PLAN/RECOMMENDATIONS:   Plan for treatment: therapy treatment to continue next visit.  Planned interventions for next visit: E-stim unattended (CPT 41731), manual therapy (CPT 87404), neuromuscular re-education (CPT 47603) and therapeutic exercise (CPT 72865).

## 2019-01-29 ENCOUNTER — PHYSICAL THERAPY (OUTPATIENT)
Dept: PHYSICAL THERAPY | Facility: REHABILITATION | Age: 84
End: 2019-01-29
Attending: PHYSICIAN ASSISTANT
Payer: MEDICARE

## 2019-01-29 DIAGNOSIS — G89.29 CHRONIC LEFT HIP PAIN: ICD-10-CM

## 2019-01-29 DIAGNOSIS — M25.552 CHRONIC LEFT HIP PAIN: ICD-10-CM

## 2019-01-29 PROCEDURE — 97110 THERAPEUTIC EXERCISES: CPT

## 2019-01-30 NOTE — OP THERAPY DAILY TREATMENT
Outpatient Physical Therapy  DAILY TREATMENT     [facility]  [address], [address]  [facility] [address]  Phone:  [phone]  Fax:  [phone]    Date: 01/29/2019    Patient: Marii Rausch  YOB: 1935  MRN: [id number]     Time Calculation  Start time: 1500  Stop time: 1523 Time Calculation (min): 23 minutes     Chief Complaint: Hip Problem    Visit #: 3    SUBJECTIVE:  Patient reports that she still is not compliant with HEP and finds it difficult to come. States she does not feel that therapy is helping.     OBJECTIVE:  Current objective measures:   Pain with all exercise          Therapeutic Exercises (CPT 17008):     2. Seated hip abd with band, 2x15    3. Seated hip add with ball, x25    4. Seated FAQ, x15    5. Seated HS curls, x15    6. Sit to stand, x5m    7. Seated marching, x20      Time-based treatments/modalities:  Therapeutic exercise minutes (CPT 11153): 23 minutes       Pain rating before treatment: 5  Pain rating after treatment: 5    ASSESSMENT:   Response to treatment: Patient has not made progress due to compliance issues and likely significance of OA.     PLAN/RECOMMENDATIONS:   Plan for treatment: therapy treatment to continue next visit.  Planned interventions for next visit: E-stim unattended (CPT 81045), manual therapy (CPT 20409), neuromuscular re-education (CPT 02969) and therapeutic exercise (CPT 11670).

## 2019-01-31 ENCOUNTER — APPOINTMENT (OUTPATIENT)
Dept: PHYSICAL THERAPY | Facility: REHABILITATION | Age: 84
End: 2019-01-31
Attending: PHYSICIAN ASSISTANT
Payer: MEDICARE

## 2019-02-05 ENCOUNTER — APPOINTMENT (OUTPATIENT)
Dept: PHYSICAL THERAPY | Facility: REHABILITATION | Age: 84
End: 2019-02-05
Attending: PHYSICIAN ASSISTANT
Payer: MEDICARE

## 2019-02-06 ENCOUNTER — APPOINTMENT (OUTPATIENT)
Dept: PHYSICAL THERAPY | Facility: REHABILITATION | Age: 84
End: 2019-02-06
Attending: PHYSICIAN ASSISTANT
Payer: MEDICARE

## 2019-02-07 ENCOUNTER — APPOINTMENT (OUTPATIENT)
Dept: PHYSICAL THERAPY | Facility: REHABILITATION | Age: 84
End: 2019-02-07
Attending: PHYSICIAN ASSISTANT
Payer: MEDICARE

## 2019-02-11 ENCOUNTER — PHYSICAL THERAPY (OUTPATIENT)
Dept: PHYSICAL THERAPY | Facility: REHABILITATION | Age: 84
End: 2019-02-11
Attending: PHYSICIAN ASSISTANT
Payer: MEDICARE

## 2019-02-11 DIAGNOSIS — G89.29 CHRONIC LEFT HIP PAIN: ICD-10-CM

## 2019-02-11 DIAGNOSIS — M25.552 CHRONIC LEFT HIP PAIN: ICD-10-CM

## 2019-02-11 PROCEDURE — 97110 THERAPEUTIC EXERCISES: CPT

## 2019-02-11 NOTE — OP THERAPY DAILY TREATMENT
Outpatient Physical Therapy  DAILY TREATMENT     [facility] [place]  [address]  [facility] [address]  Phone:  [phone]  Fax:  [phone]    Date: 02/11/2019    Patient: Marii Rausch  YOB: 1935  MRN: [id number]     Time Calculation  Start time: 1330  Stop time: 1353 Time Calculation (min): 23 minutes     Chief Complaint: Hip Problem    Visit #: 4    SUBJECTIVE:  Patient reports no change. Patient's daughter reports that she is still not compliant and she expresses great disinterest in exercise. Patient is a poor historian and does not recall her program and has to     OBJECTIVE:  Current objective measures:   No change in symptoms          Therapeutic Exercises (CPT 64111):     2. Seated hip abd with band, 2x15    3. Seated hip add with ball, x25    4. Seated FAQ, x15    5. Seated HS curls, x15    6. Sit to stand, x5m    7. Seated marching, x20      Time-based treatments/modalities:  Therapeutic exercise minutes (CPT 57694): 23 minutes       Pain rating before treatment: 7  Pain rating after treatment: 7    ASSESSMENT:   Response to treatment: Patient has reached a progress plateau and recommend for patient to continue with pain management. Due to compliance and symptoms.     PLAN/RECOMMENDATIONS:   Plan for treatment: refer patient to the specialty of PMR.  Planned interventions for next visit: Discharge to [facility].

## 2019-02-12 ENCOUNTER — APPOINTMENT (OUTPATIENT)
Dept: PHYSICAL THERAPY | Facility: REHABILITATION | Age: 84
End: 2019-02-12
Attending: PHYSICIAN ASSISTANT
Payer: MEDICARE

## 2019-02-12 NOTE — OP THERAPY DISCHARGE SUMMARY
Outpatient Physical Therapy  DISCHARGE SUMMARY NOTE      [facility] [place]  [address]  [place] [address]  Phone:  [phone]  Fax:  [phone]    Date of Visit: 02/11/2019    Patient: Marii Rausch  YOB: 1935  MRN: [id number]     Referring Provider: Nikita Ventura P.A.-C.  [address] [facility]  [address]  [place] [address]   Referring Diagnosis Pain in left hip [M25.552];Other chronic pain [G89.29]     Physical Therapy Occurrence Codes    Date of onset of impairment:  7/21/18   Date physical therapy care plan established or reviewed:  1/21/19   Date physical therapy treatment started:  1/21/19              Your patient is being discharged from Physical Therapy with the following comments:   · Progress plateau    Comments:  Patient has only been partially compliant and has reported that symptoms have not changed. Her exercise tolerance remains poor.      Limitations Remaining:  See eval    Recommendations:  Recommend referral to PMR.     Scott Brown, PT, DPT    Date: 2/11/2019

## 2019-02-13 ENCOUNTER — APPOINTMENT (OUTPATIENT)
Dept: PHYSICAL THERAPY | Facility: REHABILITATION | Age: 84
End: 2019-02-13
Attending: PHYSICIAN ASSISTANT
Payer: MEDICARE

## 2019-02-14 ENCOUNTER — APPOINTMENT (OUTPATIENT)
Dept: PHYSICAL THERAPY | Facility: REHABILITATION | Age: 84
End: 2019-02-14
Attending: PHYSICIAN ASSISTANT
Payer: MEDICARE

## 2019-02-18 ENCOUNTER — APPOINTMENT (OUTPATIENT)
Dept: PHYSICAL THERAPY | Facility: REHABILITATION | Age: 84
End: 2019-02-18
Attending: PHYSICIAN ASSISTANT
Payer: MEDICARE

## 2019-03-04 RX ORDER — LINACLOTIDE 145 UG/1
CAPSULE, GELATIN COATED ORAL
Qty: 90 CAP | Refills: 3 | Status: SHIPPED
Start: 2019-03-04 | End: 2020-01-19

## 2019-03-21 ENCOUNTER — OFFICE VISIT (OUTPATIENT)
Dept: MEDICAL GROUP | Facility: MEDICAL CENTER | Age: 84
End: 2019-03-21
Payer: MEDICARE

## 2019-03-21 VITALS
WEIGHT: 180 LBS | TEMPERATURE: 97.3 F | HEIGHT: 60 IN | DIASTOLIC BLOOD PRESSURE: 60 MMHG | HEART RATE: 89 BPM | OXYGEN SATURATION: 95 % | BODY MASS INDEX: 35.34 KG/M2 | SYSTOLIC BLOOD PRESSURE: 108 MMHG

## 2019-03-21 DIAGNOSIS — K59.09 CHRONIC CONSTIPATION: ICD-10-CM

## 2019-03-21 DIAGNOSIS — M12.9 ARTHRITIS, MULTIPLE JOINT INVOLVEMENT: ICD-10-CM

## 2019-03-21 DIAGNOSIS — G89.29 CHRONIC LEFT HIP PAIN: ICD-10-CM

## 2019-03-21 DIAGNOSIS — M62.838 MUSCLE SPASM: ICD-10-CM

## 2019-03-21 DIAGNOSIS — Z79.891 CHRONIC USE OF OPIATE DRUGS THERAPEUTIC PURPOSES: ICD-10-CM

## 2019-03-21 DIAGNOSIS — R73.01 IFG (IMPAIRED FASTING GLUCOSE): ICD-10-CM

## 2019-03-21 DIAGNOSIS — F01.50 VASCULAR DEMENTIA WITHOUT BEHAVIORAL DISTURBANCE (HCC): ICD-10-CM

## 2019-03-21 DIAGNOSIS — E55.9 VITAMIN D DEFICIENCY: ICD-10-CM

## 2019-03-21 DIAGNOSIS — M25.552 CHRONIC LEFT HIP PAIN: ICD-10-CM

## 2019-03-21 DIAGNOSIS — Z79.899 HIGH RISK MEDICATION USE: ICD-10-CM

## 2019-03-21 DIAGNOSIS — E03.9 ACQUIRED HYPOTHYROIDISM: ICD-10-CM

## 2019-03-21 DIAGNOSIS — E78.5 HYPERLIPIDEMIA LDL GOAL <100: ICD-10-CM

## 2019-03-21 PROCEDURE — 99214 OFFICE O/P EST MOD 30 MIN: CPT | Performed by: NURSE PRACTITIONER

## 2019-03-21 RX ORDER — MELOXICAM 7.5 MG/1
7.5 TABLET ORAL DAILY
Qty: 90 TAB | Refills: 3 | Status: SHIPPED | OUTPATIENT
Start: 2019-03-21 | End: 2019-04-03 | Stop reason: SDUPTHER

## 2019-03-21 RX ORDER — POLYETHYLENE GLYCOL 3350 17 G/17G
17 POWDER, FOR SOLUTION ORAL PRN
Qty: 90 EACH | Refills: 3 | Status: SHIPPED | OUTPATIENT
Start: 2019-03-21 | End: 2019-06-10

## 2019-03-21 RX ORDER — BACLOFEN 10 MG/1
10 TABLET ORAL NIGHTLY PRN
Qty: 90 TAB | Refills: 3 | Status: SHIPPED | OUTPATIENT
Start: 2019-03-21 | End: 2019-04-03 | Stop reason: SDUPTHER

## 2019-03-21 RX ORDER — HYDROCODONE BITARTRATE AND ACETAMINOPHEN 10; 325 MG/1; MG/1
1 TABLET ORAL EVERY 8 HOURS PRN
Qty: 60 TAB | Refills: 0 | Status: SHIPPED | OUTPATIENT
Start: 2019-04-21 | End: 2019-03-21 | Stop reason: SDUPTHER

## 2019-03-21 RX ORDER — HYDROCODONE BITARTRATE AND ACETAMINOPHEN 10; 325 MG/1; MG/1
1 TABLET ORAL EVERY 8 HOURS PRN
Qty: 60 TAB | Refills: 0 | Status: SHIPPED | OUTPATIENT
Start: 2019-03-21 | End: 2019-03-21 | Stop reason: SDUPTHER

## 2019-03-21 RX ORDER — HYDROCODONE BITARTRATE AND ACETAMINOPHEN 10; 325 MG/1; MG/1
1 TABLET ORAL EVERY 8 HOURS PRN
Qty: 60 TAB | Refills: 0 | Status: SHIPPED | OUTPATIENT
Start: 2019-05-21 | End: 2019-06-20

## 2019-03-21 ASSESSMENT — PATIENT HEALTH QUESTIONNAIRE - PHQ9: CLINICAL INTERPRETATION OF PHQ2 SCORE: 0

## 2019-03-21 NOTE — PROGRESS NOTES
Subjective:     Marii Rausch is a 83 y.o. female who presents with OA.    HPI:   Seen in f/u for chronic pain r/t OA.  She is seen with ehr daughter. p t has dementia that has been worsening.  uriel is caregiver.  Pt and her  live with daughter.  She is also having more joint pain, nikos in summer.   She is crying sometimes with the pain.  She has pain in multiple joints but now having lots worse pain in left hip.  Injections having not helped in the past.   She is on norco 1-2 a day.  She is due refill.  She is on mobic 1x day to also help control pain.  She uses baclofen at nite.  Needs refills on all meds.  UDS and contract are up to date.  She is due updated lab.  Her last a1c was 5.9.  She is not on meds.  She is on synthroid and will be due updated thyroid lab.    Will check cbc and CMP to confirm no anemia or issues d/t chronic pain meds.      Patient Active Problem List    Diagnosis Date Noted   • Lumbar stenosis      Priority: Low   • DDD (degenerative disc disease)      Priority: Low   • Osteoporosis, senile 12/03/2009     Priority: Low   • Chronic left hip pain 12/04/2018   • Constipation 12/04/2018   • Hyperlipidemia LDL goal <100 05/09/2017   • Arthritis    • Peripheral neuropathy 09/03/2015   • Vitamin d deficiency    • History of pancreatitis    • Allergic rhinitis 04/14/2011   • Metabolic syndrome    • Fatty liver    • Diverticulitis    • Preventative health care 04/15/2010   • Atypical squamous cells of undetermined significance (ASCUS) on Papanicolaou smear of cervix 05/08/2009   • H. pylori infection 03/17/2008       Current medicines (including changes today)  Current Outpatient Prescriptions   Medication Sig Dispense Refill   • meloxicam (MOBIC) 7.5 MG Tab Take 1 Tab by mouth every day. 90 Tab 3   • baclofen (LIORESAL) 10 MG Tab Take 1 Tab by mouth at bedtime as needed. 90 Tab 3   • polyethylene glycol/lytes (MIRALAX) Pack Take 1 Packet by mouth as needed. 90 Each 3   •  [START ON 5/21/2019] HYDROcodone/acetaminophen (NORCO)  MG Tab Take 1 Tab by mouth every 8 hours as needed for up to 30 days. 60 Tab 0   • LINZESS 145 MCG Cap TAKE ONE CAPSULE BY MOUTH ONCE DAILY 90 Cap 3   • alendronate (FOSAMAX) 70 MG Tab Take 1 Tab by mouth every 7 days. 12 Tab 3   • atorvastatin (LIPITOR) 40 MG Tab Take 1 Tab by mouth every day. 90 Tab 3   • escitalopram (LEXAPRO) 20 MG tablet TAKE ONE TABLET BY MOUTH ONCE DAILY 90 Tab 3   • PATADAY 0.2 % Solution INSTILL ONE DROP INTO EACH EYE ONCE DAILY 3 Bottle 3   • levothyroxine (SYNTHROID) 25 MCG Tab TAKE ONE TABLET BY MOUTH IN THE MORNING ON EMPTY STOMACH 90 Tab 3   • donepezil (ARICEPT) 10 MG tablet TAKE ONE TABLET BY MOUTH ONCE DAILY 90 Tab 1   • docusate sodium (COLACE) 100 MG Cap Take 100 mg by mouth every bedtime.     • memantine (NAMENDA) 5 MG Tab TAKE ONE TABLET BY MOUTH TWICE DAILY 180 Tab 3   • Diclofenac Sodium 1 % Gel Apply 1 Application to affected area(s) 2 Times a Day. 1 Tube 3   • Cholecalciferol (VITAMIN D PO) Take 1 Tab by mouth every day.     • Omega-3 Fatty Acids (FISH OIL) 1000 MG Cap capsule Take 2,000 mg by mouth every day.     • CALCIUM PO Take 1 Tab by mouth every day.     • Glucos-Chondroit-Hyaluron-MSM (GLUCOSAMINE CHONDROITIN JOINT PO) Take 1 Tab by mouth every day.     • montelukast (SINGULAIR) 10 MG Tab Take 1 Tab by mouth every day. 30 Tab 30     No current facility-administered medications for this visit.        Allergies   Allergen Reactions   • Etodolac Anaphylaxis     dizziness   • Lactose Diarrhea     intolerant       ROS  Constitutional: Negative. Negative for fever, chills, weight loss, malaise/fatigue and diaphoresis.   HENT: Negative. Negative for hearing loss, ear pain, nosebleeds, congestion, sore throat, neck pain, tinnitus and ear discharge.   Respiratory: Negative. Negative for cough, hemoptysis, sputum production, shortness of breath, wheezing and stridor.   Cardiovascular: Negative. Negative for chest  pain, palpitations, orthopnea, claudication, leg swelling and PND.   Gastrointestinal: Denies nausea, vomiting, diarrhea, constipation, heartburn, melena or hematochezia.  Genitourinary: Denies dysuria, hematuria, urinary incontinence, frequency or urgency.        Objective:     Blood pressure 108/60, pulse 89, temperature 36.3 °C (97.3 °F), temperature source Temporal, height 1.524 m (5'), weight 81.6 kg (180 lb), SpO2 95 %, not currently breastfeeding. Body mass index is 35.15 kg/m².    Physical Exam:  Vitals reviewed.  Constitutional: appears well-developed and well-nourished. No distress.   Cardiovascular: Normal rate, regular rhythm, normal heart sounds and intact distal pulses. Exam reveals no gallop and no friction rub. No murmur heard. No carotid bruits.   Pulmonary/Chest: Effort normal and breath sounds normal. No stridor. No respiratory distress. no wheezes or rales. exhibits no tenderness.   Musculoskeletal:  Pain in left hip to palp.  exhibits no edema. bryn pedal pulses 2+.  Lymphadenopathy: No cervical or supraclavicular adenopathy.   Neurological:  exhibits normal muscle tone.  Skin: Skin is warm and dry. No diaphoresis.   Psychiatric: Normal mood and affect. Behavior is normal.      Assessment and Plan:     The following treatment plan was discussed:    1. Muscle spasm  meloxicam (MOBIC) 7.5 MG Tab    baclofen (LIORESAL) 10 MG Tab    HYDROcodone/acetaminophen (NORCO)  MG Tab    DISCONTINUED: HYDROcodone/acetaminophen (NORCO)  MG Tab    DISCONTINUED: HYDROcodone/acetaminophen (NORCO)  MG Tab    pain in left hip continues.  UDS and contract up to date.  contniue mobic, bacloofen and norco.  f/u 3 months for med refill   2. Chronic left hip pain  meloxicam (MOBIC) 7.5 MG Tab    baclofen (LIORESAL) 10 MG Tab    HYDROcodone/acetaminophen (NORCO)  MG Tab    DISCONTINUED: HYDROcodone/acetaminophen (NORCO)  MG Tab    DISCONTINUED: HYDROcodone/acetaminophen (NORCO)  MG Tab       take mobic in am and baclofen in pm   3. Arthritis, multiple joint involvement  meloxicam (MOBIC) 7.5 MG Tab    baclofen (LIORESAL) 10 MG Tab    HYDROcodone/acetaminophen (NORCO)  MG Tab    DISCONTINUED: HYDROcodone/acetaminophen (NORCO)  MG Tab    DISCONTINUED: HYDROcodone/acetaminophen (NORCO)  MG Tab   4. Chronic use of opiate drugs therapeutic purposes  meloxicam (MOBIC) 7.5 MG Tab    baclofen (LIORESAL) 10 MG Tab    HYDROcodone/acetaminophen (NORCO)  MG Tab    DISCONTINUED: HYDROcodone/acetaminophen (NORCO)  MG Tab    DISCONTINUED: HYDROcodone/acetaminophen (NORCO)  MG Tab   5. Chronic constipation  polyethylene glycol/lytes (MIRALAX) Pack   6. Vascular dementia without behavioral disturbance  REFERRAL TO GERIATRICS    refer [facility]    7. High risk medication use  Comp Metabolic Panel   8. IFG (impaired fasting glucose)  Comp Metabolic Panel    HEMOGLOBIN A1C    MICROALBUMIN CREAT RATIO URINE   9. Hyperlipidemia LDL goal <100  Comp Metabolic Panel    Lipid Profile   10. Vitamin D deficiency  VITAMIN D,25 HYDROXY   11. Acquired hypothyroidism  TSH    FREE THYROXINE         Followup: Return in about 3 months (around 6/21/2019).

## 2019-03-29 LAB
25(OH)D3+25(OH)D2 SERPL-MCNC: 40.4 NG/ML (ref 30–100)
ALBUMIN SERPL-MCNC: 4.3 G/DL (ref 3.5–4.7)
ALBUMIN/CREAT UR: 5 MG/G CREAT (ref 0–30)
ALBUMIN/GLOB SERPL: 2 {RATIO} (ref 1.2–2.2)
ALP SERPL-CCNC: 74 IU/L (ref 39–117)
ALT SERPL-CCNC: 15 IU/L (ref 0–32)
AST SERPL-CCNC: 21 IU/L (ref 0–40)
BILIRUB SERPL-MCNC: 0.4 MG/DL (ref 0–1.2)
BUN SERPL-MCNC: 19 MG/DL (ref 8–27)
BUN/CREAT SERPL: 23 (ref 12–28)
CALCIUM SERPL-MCNC: 9.3 MG/DL (ref 8.7–10.3)
CHLORIDE SERPL-SCNC: 103 MMOL/L (ref 96–106)
CHOLEST SERPL-MCNC: 146 MG/DL (ref 100–199)
CO2 SERPL-SCNC: 25 MMOL/L (ref 20–29)
CREAT SERPL-MCNC: 0.82 MG/DL (ref 0.57–1)
CREAT UR-MCNC: 160.9 MG/DL
GLOBULIN SER CALC-MCNC: 2.2 G/DL (ref 1.5–4.5)
GLUCOSE SERPL-MCNC: 107 MG/DL (ref 65–99)
HBA1C MFR BLD: 6.2 % (ref 4.8–5.6)
HDLC SERPL-MCNC: 58 MG/DL
LABORATORY COMMENT REPORT: NORMAL
LDLC SERPL CALC-MCNC: 69 MG/DL (ref 0–99)
MICROALBUMIN UR-MCNC: 8 UG/ML
POTASSIUM SERPL-SCNC: 5.3 MMOL/L (ref 3.5–5.2)
PROT SERPL-MCNC: 6.5 G/DL (ref 6–8.5)
SODIUM SERPL-SCNC: 142 MMOL/L (ref 134–144)
T4 FREE SERPL-MCNC: 1.07 NG/DL (ref 0.82–1.77)
TRIGL SERPL-MCNC: 96 MG/DL (ref 0–149)
TSH SERPL DL<=0.005 MIU/L-ACNC: 2.62 UIU/ML (ref 0.45–4.5)
VLDLC SERPL CALC-MCNC: 19 MG/DL (ref 5–40)

## 2019-04-03 ENCOUNTER — OFFICE VISIT (OUTPATIENT)
Dept: MEDICAL GROUP | Facility: MEDICAL CENTER | Age: 84
End: 2019-04-03
Payer: MEDICARE

## 2019-04-03 VITALS
HEIGHT: 60 IN | TEMPERATURE: 97.7 F | OXYGEN SATURATION: 94 % | HEART RATE: 84 BPM | BODY MASS INDEX: 29.64 KG/M2 | WEIGHT: 151 LBS | DIASTOLIC BLOOD PRESSURE: 60 MMHG | SYSTOLIC BLOOD PRESSURE: 108 MMHG

## 2019-04-03 DIAGNOSIS — E87.5 HYPERKALEMIA: ICD-10-CM

## 2019-04-03 DIAGNOSIS — M25.552 CHRONIC LEFT HIP PAIN: ICD-10-CM

## 2019-04-03 DIAGNOSIS — E03.9 HYPOTHYROIDISM, UNSPECIFIED TYPE: ICD-10-CM

## 2019-04-03 DIAGNOSIS — R73.01 IFG (IMPAIRED FASTING GLUCOSE): ICD-10-CM

## 2019-04-03 DIAGNOSIS — M12.9 ARTHRITIS, MULTIPLE JOINT INVOLVEMENT: ICD-10-CM

## 2019-04-03 DIAGNOSIS — F32.89 OTHER DEPRESSION: ICD-10-CM

## 2019-04-03 DIAGNOSIS — G89.29 CHRONIC LEFT HIP PAIN: ICD-10-CM

## 2019-04-03 PROCEDURE — 99214 OFFICE O/P EST MOD 30 MIN: CPT | Performed by: NURSE PRACTITIONER

## 2019-04-03 RX ORDER — BACLOFEN 10 MG/1
10 TABLET ORAL NIGHTLY PRN
Qty: 90 TAB | Refills: 3 | Status: SHIPPED | OUTPATIENT
Start: 2019-04-03 | End: 2019-06-10

## 2019-04-03 RX ORDER — MELOXICAM 7.5 MG/1
7.5 TABLET ORAL DAILY
Qty: 90 TAB | Refills: 3 | Status: SHIPPED | OUTPATIENT
Start: 2019-04-03 | End: 2019-06-10

## 2019-04-03 RX ORDER — OLOPATADINE HYDROCHLORIDE 2 MG/ML
SOLUTION/ DROPS OPHTHALMIC
Qty: 3 BOTTLE | Refills: 3 | Status: SHIPPED | OUTPATIENT
Start: 2019-04-03 | End: 2019-06-10

## 2019-04-03 RX ORDER — ESCITALOPRAM OXALATE 20 MG/1
TABLET ORAL
Qty: 90 TAB | Refills: 3 | Status: SHIPPED | OUTPATIENT
Start: 2019-04-03

## 2019-04-03 NOTE — PROGRESS NOTES
Subjective:     Marii Rausch is a 83 y.o. female who presents with IFG.    HPI:   Seen in f/u for IGF  She is leaving for [place] for 3 months.  She is very excited.  Needs meds refilled to last through vacation.  She is seen with daughter, who is her caregiver.   Daughter has hired a caregiver to take care of her in mother in [place] d/t pts dementia  Reviewed lab with pt and daughter.  Her CMP is wnl except her k+ mildly elevated at 5.3.  Not on any supplement for k+.  Will dec high K+ foods.  a1c is stable at 6.2.    D, TSH, T4, alb/cr ratio, LP is wnl.      Patient Active Problem List    Diagnosis Date Noted   • Lumbar stenosis      Priority: Low   • DDD (degenerative disc disease)      Priority: Low   • Osteoporosis, senile 12/03/2009     Priority: Low   • Chronic left hip pain 12/04/2018   • Constipation 12/04/2018   • Hyperlipidemia LDL goal <100 05/09/2017   • Arthritis    • Peripheral neuropathy 09/03/2015   • Vitamin d deficiency    • History of pancreatitis    • Allergic rhinitis 04/14/2011   • Metabolic syndrome    • Fatty liver    • Diverticulitis    • Preventative health care 04/15/2010   • Atypical squamous cells of undetermined significance (ASCUS) on Papanicolaou smear of cervix 05/08/2009   • H. pylori infection 03/17/2008       Current medicines (including changes today)  Current Outpatient Prescriptions   Medication Sig Dispense Refill   • meloxicam (MOBIC) 7.5 MG Tab Take 1 Tab by mouth every day. 90 Tab 3   • baclofen (LIORESAL) 10 MG Tab Take 1 Tab by mouth at bedtime as needed. 90 Tab 3   • escitalopram (LEXAPRO) 20 MG tablet TAKE ONE TABLET BY MOUTH ONCE DAILY 90 Tab 3   • Diclofenac Sodium 1 % Gel Apply 1 Application to affected area(s) 2 Times a Day. 1 Tube 6   • Olopatadine HCl (PATADAY) 0.2 % Solution INSTILL ONE DROP INTO EACH EYE ONCE DAILY 3 Bottle 3   • polyethylene glycol/lytes (MIRALAX) Pack Take 1 Packet by mouth as needed. 90 Each 3   • [START ON 5/21/2019]  HYDROcodone/acetaminophen (NORCO)  MG Tab Take 1 Tab by mouth every 8 hours as needed for up to 30 days. 60 Tab 0   • LINZESS 145 MCG Cap TAKE ONE CAPSULE BY MOUTH ONCE DAILY 90 Cap 3   • alendronate (FOSAMAX) 70 MG Tab Take 1 Tab by mouth every 7 days. 12 Tab 3   • atorvastatin (LIPITOR) 40 MG Tab Take 1 Tab by mouth every day. 90 Tab 3   • levothyroxine (SYNTHROID) 25 MCG Tab TAKE ONE TABLET BY MOUTH IN THE MORNING ON EMPTY STOMACH 90 Tab 3   • donepezil (ARICEPT) 10 MG tablet TAKE ONE TABLET BY MOUTH ONCE DAILY 90 Tab 1   • docusate sodium (COLACE) 100 MG Cap Take 100 mg by mouth every bedtime.     • memantine (NAMENDA) 5 MG Tab TAKE ONE TABLET BY MOUTH TWICE DAILY 180 Tab 3   • Cholecalciferol (VITAMIN D PO) Take 1 Tab by mouth every day.     • Omega-3 Fatty Acids (FISH OIL) 1000 MG Cap capsule Take 2,000 mg by mouth every day.     • CALCIUM PO Take 1 Tab by mouth every day.     • Glucos-Chondroit-Hyaluron-MSM (GLUCOSAMINE CHONDROITIN JOINT PO) Take 1 Tab by mouth every day.     • montelukast (SINGULAIR) 10 MG Tab Take 1 Tab by mouth every day. 30 Tab 30     No current facility-administered medications for this visit.        Allergies   Allergen Reactions   • Etodolac Anaphylaxis     dizziness   • Lactose Diarrhea     intolerant       ROS  Constitutional: Negative. Negative for fever, chills, weight loss, malaise/fatigue and diaphoresis.   HENT: Negative. Negative for hearing loss, ear pain, nosebleeds, congestion, sore throat, neck pain, tinnitus and ear discharge.   Respiratory: Negative. Negative for cough, hemoptysis, sputum production, shortness of breath, wheezing and stridor.   Cardiovascular: Negative. Negative for chest pain, palpitations, orthopnea, claudication, leg swelling and PND.   Gastrointestinal: Denies nausea, vomiting, diarrhea, constipation, heartburn, melena or hematochezia.  Genitourinary: Denies dysuria, hematuria, urinary incontinence, frequency or urgency.        Objective:       Body mass index is 29.49 kg/m².    Physical Exam:  Vitals reviewed.  Constitutional: appears well-developed and well-nourished. No distress.   Cardiovascular: Normal rate, regular rhythm, normal heart sounds and intact distal pulses. Exam reveals no gallop and no friction rub. No murmur heard. No carotid bruits.   Pulmonary/Chest: Effort normal and breath sounds normal. No stridor. No respiratory distress. no wheezes or rales. exhibits no tenderness.   Musculoskeletal:exhibits no edema. bryn pedal pulses 2+.  Neurological:  exhibits normal muscle tone.  Skin: Skin is warm and dry. No diaphoresis.   Psychiatric: Normal mood and affect. Behavior is normal.      Assessment and Plan:     The following treatment plan was discussed:    1. IFG (impaired fasting glucose)      stable w/o meds.  f/u 6 months.  call for lab slip   2. Chronic left hip pain  meloxicam (MOBIC) 7.5 MG Tab    baclofen (LIORESAL) 10 MG Tab    refilled meds.  f/u 6 months.  call for lab slip.     3. Hypothyroidism, unspecified type      stable on meds.  lab wnl   4. Arthritis, multiple joint involvement  meloxicam (MOBIC) 7.5 MG Tab    baclofen (LIORESAL) 10 MG Tab    refilled meds.  going to [place] for 3 monthsl    5. Other depression  escitalopram (LEXAPRO) 20 MG tablet    stable on meds.  refilled meds   6. Hyperkalemia      very mild.  limit high k+ foods.           Followup: Return in about 6 months (around 10/3/2019).

## 2019-04-09 ENCOUNTER — HOSPITAL ENCOUNTER (EMERGENCY)
Facility: MEDICAL CENTER | Age: 84
End: 2019-04-09
Attending: EMERGENCY MEDICINE
Payer: MEDICARE

## 2019-04-09 VITALS
SYSTOLIC BLOOD PRESSURE: 136 MMHG | HEIGHT: 60 IN | RESPIRATION RATE: 18 BRPM | DIASTOLIC BLOOD PRESSURE: 57 MMHG | TEMPERATURE: 98.5 F | OXYGEN SATURATION: 95 % | BODY MASS INDEX: 29.45 KG/M2 | WEIGHT: 150 LBS | HEART RATE: 84 BPM

## 2019-04-09 DIAGNOSIS — S40.021A CONTUSION OF RIGHT UPPER ARM, INITIAL ENCOUNTER: ICD-10-CM

## 2019-04-09 PROCEDURE — 99282 EMERGENCY DEPT VISIT SF MDM: CPT

## 2019-04-10 NOTE — DISCHARGE INSTRUCTIONS
Apply cool compresses as discussed    Take Tylenol every 8 hours as needed for pain control    Recheck in 48 hours if not better and sooner if worse

## 2019-04-10 NOTE — ED NOTES
Cool compress bag given to pt.  Discharge and f/u instructions discussed w/ pt and her daughter and understanding verbalized.  Out of ED via personal WC.

## 2019-04-10 NOTE — ED PROVIDER NOTES
ED Provider Note    CHIEF COMPLAINT  Chief Complaint   Patient presents with   • Rash   • Nodule       HPI  Marii Rausch is a 83 y.o. female who presents with pain and swelling to the right bicep region.  The patient does not remember any direct injury.  She presents the emerge part with some ecchymosis and swelling to the distal aspect of the biceps on the right upper extremity.  She has not had any associated fevers.  She has not had any vomiting.  She does not have any loss of function to the right hand but does have pain with flexion at the elbow.    REVIEW OF SYSTEMS  No difficulty with breathing, no nausea or vomiting, no other muscular skeletal complaints    PHYSICAL EXAM  VITAL SIGNS: /57   Pulse 84   Temp 36.9 °C (98.5 °F) (Temporal)   Resp 18   Ht 1.524 m (5')   Wt 68 kg (150 lb)   SpO2 95%   BMI 29.29 kg/m²   In general the patient does not appear toxic  Extremities patient does not have any obvious skeletal deformities.  She does have full flexion and extension at the elbow as well as the wrist on the right.  The patient does have ecchymosis to the distal anterior aspect of the biceps with no surrounding induration, erythema, no warmth.  Skin the ecchymosis described above to the right bicep region  Neurovascular examination is intact to the right upper extremity      COURSE & MEDICAL DECISION MAKING  Pertinent Labs & Imaging studies reviewed. (See chart for details)  This an 83-year-old female who presents the emerge department with signs and symptoms consistent with a contusion to the right bicep versus a small bicep tear.  The patient does have full flexion at the elbow although his discomfort which supports a muscular injury.  The patient be treated with cool compresses and Tylenol.  If she does not respond in 48 hours she will return for repeat examination.    FINAL IMPRESSION  1.  Contusion to the right bicep versus strain       Disposition  The patient will be discharged  in stable condition      Electronically signed by: Bandar Deutsch, 4/9/2019 8:24 PM

## 2019-06-10 ENCOUNTER — HOSPITAL ENCOUNTER (OUTPATIENT)
Facility: MEDICAL CENTER | Age: 84
End: 2019-06-11
Attending: EMERGENCY MEDICINE | Admitting: HOSPITALIST
Payer: MEDICARE

## 2019-06-10 ENCOUNTER — APPOINTMENT (OUTPATIENT)
Dept: RADIOLOGY | Facility: MEDICAL CENTER | Age: 84
End: 2019-06-10
Attending: EMERGENCY MEDICINE
Payer: MEDICARE

## 2019-06-10 DIAGNOSIS — R06.00 DYSPNEA, UNSPECIFIED TYPE: ICD-10-CM

## 2019-06-10 PROBLEM — F03.90 DEMENTIA (HCC): Status: ACTIVE | Noted: 2019-06-10

## 2019-06-10 PROBLEM — R06.09 EXERTIONAL DYSPNEA: Status: ACTIVE | Noted: 2019-06-10

## 2019-06-10 LAB
ALBUMIN SERPL BCP-MCNC: 3.4 G/DL (ref 3.2–4.9)
ALBUMIN/GLOB SERPL: 1.3 G/DL
ALP SERPL-CCNC: 78 U/L (ref 30–99)
ALT SERPL-CCNC: 16 U/L (ref 2–50)
ANION GAP SERPL CALC-SCNC: 11 MMOL/L (ref 0–11.9)
AST SERPL-CCNC: 20 U/L (ref 12–45)
BASOPHILS # BLD AUTO: 1.5 % (ref 0–1.8)
BASOPHILS # BLD: 0.08 K/UL (ref 0–0.12)
BILIRUB SERPL-MCNC: 0.6 MG/DL (ref 0.1–1.5)
BNP SERPL-MCNC: 63 PG/ML (ref 0–100)
BUN SERPL-MCNC: 25 MG/DL (ref 8–22)
CALCIUM SERPL-MCNC: 8.6 MG/DL (ref 8.4–10.2)
CHLORIDE SERPL-SCNC: 106 MMOL/L (ref 96–112)
CO2 SERPL-SCNC: 23 MMOL/L (ref 20–33)
CREAT SERPL-MCNC: 0.74 MG/DL (ref 0.5–1.4)
EKG IMPRESSION: NORMAL
EOSINOPHIL # BLD AUTO: 0.41 K/UL (ref 0–0.51)
EOSINOPHIL NFR BLD: 7.6 % (ref 0–6.9)
ERYTHROCYTE [DISTWIDTH] IN BLOOD BY AUTOMATED COUNT: 52.2 FL (ref 35.9–50)
GLOBULIN SER CALC-MCNC: 2.6 G/DL (ref 1.9–3.5)
GLUCOSE SERPL-MCNC: 108 MG/DL (ref 65–99)
HCT VFR BLD AUTO: 39.3 % (ref 37–47)
HGB BLD-MCNC: 12.8 G/DL (ref 12–16)
IMM GRANULOCYTES # BLD AUTO: 0.02 K/UL (ref 0–0.11)
IMM GRANULOCYTES NFR BLD AUTO: 0.4 % (ref 0–0.9)
LYMPHOCYTES # BLD AUTO: 1.91 K/UL (ref 1–4.8)
LYMPHOCYTES NFR BLD: 35.6 % (ref 22–41)
MCH RBC QN AUTO: 30.6 PG (ref 27–33)
MCHC RBC AUTO-ENTMCNC: 32.6 G/DL (ref 33.6–35)
MCV RBC AUTO: 94 FL (ref 81.4–97.8)
MONOCYTES # BLD AUTO: 0.52 K/UL (ref 0–0.85)
MONOCYTES NFR BLD AUTO: 9.7 % (ref 0–13.4)
NEUTROPHILS # BLD AUTO: 2.43 K/UL (ref 2–7.15)
NEUTROPHILS NFR BLD: 45.2 % (ref 44–72)
NRBC # BLD AUTO: 0 K/UL
NRBC BLD-RTO: 0 /100 WBC
PLATELET # BLD AUTO: 169 K/UL (ref 164–446)
PMV BLD AUTO: 11.2 FL (ref 9–12.9)
POTASSIUM SERPL-SCNC: 3.9 MMOL/L (ref 3.6–5.5)
PROT SERPL-MCNC: 6 G/DL (ref 6–8.2)
RBC # BLD AUTO: 4.18 M/UL (ref 4.2–5.4)
SODIUM SERPL-SCNC: 140 MMOL/L (ref 135–145)
TROPONIN I SERPL-MCNC: 0.04 NG/ML (ref 0–0.04)
TROPONIN I SERPL-MCNC: <0.02 NG/ML (ref 0–0.04)
TROPONIN I SERPL-MCNC: <0.02 NG/ML (ref 0–0.04)
TSH SERPL DL<=0.005 MIU/L-ACNC: 0.89 UIU/ML (ref 0.38–5.33)
WBC # BLD AUTO: 5.4 K/UL (ref 4.8–10.8)

## 2019-06-10 PROCEDURE — 84443 ASSAY THYROID STIM HORMONE: CPT

## 2019-06-10 PROCEDURE — 94760 N-INVAS EAR/PLS OXIMETRY 1: CPT

## 2019-06-10 PROCEDURE — G0378 HOSPITAL OBSERVATION PER HR: HCPCS

## 2019-06-10 PROCEDURE — 85025 COMPLETE CBC W/AUTO DIFF WBC: CPT

## 2019-06-10 PROCEDURE — 71045 X-RAY EXAM CHEST 1 VIEW: CPT

## 2019-06-10 PROCEDURE — 71275 CT ANGIOGRAPHY CHEST: CPT

## 2019-06-10 PROCEDURE — 84484 ASSAY OF TROPONIN QUANT: CPT | Mod: 91

## 2019-06-10 PROCEDURE — 36415 COLL VENOUS BLD VENIPUNCTURE: CPT

## 2019-06-10 PROCEDURE — 700105 HCHG RX REV CODE 258: Performed by: EMERGENCY MEDICINE

## 2019-06-10 PROCEDURE — 80053 COMPREHEN METABOLIC PANEL: CPT

## 2019-06-10 PROCEDURE — 93005 ELECTROCARDIOGRAM TRACING: CPT | Performed by: EMERGENCY MEDICINE

## 2019-06-10 PROCEDURE — 700102 HCHG RX REV CODE 250 W/ 637 OVERRIDE(OP): Performed by: HOSPITALIST

## 2019-06-10 PROCEDURE — A9270 NON-COVERED ITEM OR SERVICE: HCPCS | Performed by: HOSPITALIST

## 2019-06-10 PROCEDURE — 700117 HCHG RX CONTRAST REV CODE 255: Performed by: EMERGENCY MEDICINE

## 2019-06-10 PROCEDURE — 99220 PR INITIAL OBSERVATION CARE,LEVL III: CPT | Performed by: HOSPITALIST

## 2019-06-10 PROCEDURE — 73030 X-RAY EXAM OF SHOULDER: CPT | Mod: RT

## 2019-06-10 PROCEDURE — 99285 EMERGENCY DEPT VISIT HI MDM: CPT

## 2019-06-10 PROCEDURE — 83880 ASSAY OF NATRIURETIC PEPTIDE: CPT

## 2019-06-10 RX ORDER — MONTELUKAST SODIUM 10 MG/1
10 TABLET ORAL DAILY
Status: DISCONTINUED | OUTPATIENT
Start: 2019-06-10 | End: 2019-06-11 | Stop reason: HOSPADM

## 2019-06-10 RX ORDER — ACETAMINOPHEN 325 MG/1
650 TABLET ORAL EVERY 6 HOURS PRN
Status: DISCONTINUED | OUTPATIENT
Start: 2019-06-10 | End: 2019-06-11 | Stop reason: HOSPADM

## 2019-06-10 RX ORDER — PREGABALIN 100 MG/1
100 CAPSULE ORAL 2 TIMES DAILY
Status: DISCONTINUED | OUTPATIENT
Start: 2019-06-10 | End: 2019-06-11 | Stop reason: HOSPADM

## 2019-06-10 RX ORDER — LEVOTHYROXINE SODIUM 0.03 MG/1
25 TABLET ORAL
Status: DISCONTINUED | OUTPATIENT
Start: 2019-06-10 | End: 2019-06-11 | Stop reason: HOSPADM

## 2019-06-10 RX ORDER — PREGABALIN 100 MG/1
100 CAPSULE ORAL 2 TIMES DAILY
COMMUNITY
End: 2019-09-25 | Stop reason: SDUPTHER

## 2019-06-10 RX ORDER — DONEPEZIL HYDROCHLORIDE 5 MG/1
5 TABLET, FILM COATED ORAL NIGHTLY
Status: DISCONTINUED | OUTPATIENT
Start: 2019-06-10 | End: 2019-06-11 | Stop reason: HOSPADM

## 2019-06-10 RX ORDER — POLYETHYLENE GLYCOL 3350 17 G/17G
1 POWDER, FOR SOLUTION ORAL
Status: DISCONTINUED | OUTPATIENT
Start: 2019-06-10 | End: 2019-06-11 | Stop reason: HOSPADM

## 2019-06-10 RX ORDER — SODIUM CHLORIDE 9 MG/ML
500 INJECTION, SOLUTION INTRAVENOUS ONCE
Status: COMPLETED | OUTPATIENT
Start: 2019-06-10 | End: 2019-06-11

## 2019-06-10 RX ORDER — AMOXICILLIN 250 MG
2 CAPSULE ORAL 2 TIMES DAILY
Status: DISCONTINUED | OUTPATIENT
Start: 2019-06-10 | End: 2019-06-11 | Stop reason: HOSPADM

## 2019-06-10 RX ORDER — MEMANTINE HYDROCHLORIDE 10 MG/1
10 TABLET ORAL DAILY
Status: DISCONTINUED | OUTPATIENT
Start: 2019-06-10 | End: 2019-06-11 | Stop reason: HOSPADM

## 2019-06-10 RX ORDER — ATORVASTATIN CALCIUM 40 MG/1
40 TABLET, FILM COATED ORAL EVERY EVENING
Status: DISCONTINUED | OUTPATIENT
Start: 2019-06-10 | End: 2019-06-11 | Stop reason: HOSPADM

## 2019-06-10 RX ORDER — TRAMADOL HYDROCHLORIDE 50 MG/1
50 TABLET ORAL EVERY 6 HOURS PRN
Status: DISCONTINUED | OUTPATIENT
Start: 2019-06-10 | End: 2019-06-11 | Stop reason: HOSPADM

## 2019-06-10 RX ORDER — BISACODYL 10 MG
10 SUPPOSITORY, RECTAL RECTAL
Status: DISCONTINUED | OUTPATIENT
Start: 2019-06-10 | End: 2019-06-11 | Stop reason: HOSPADM

## 2019-06-10 RX ORDER — ESCITALOPRAM OXALATE 10 MG/1
20 TABLET ORAL DAILY
Status: DISCONTINUED | OUTPATIENT
Start: 2019-06-10 | End: 2019-06-11 | Stop reason: HOSPADM

## 2019-06-10 RX ORDER — DONEPEZIL HYDROCHLORIDE 5 MG/1
5 TABLET, FILM COATED ORAL NIGHTLY
COMMUNITY
End: 2020-01-19

## 2019-06-10 RX ORDER — MEMANTINE HYDROCHLORIDE 10 MG/1
10 TABLET ORAL DAILY
COMMUNITY
End: 2020-01-19

## 2019-06-10 RX ADMIN — PREGABALIN 100 MG: 100 CAPSULE ORAL at 17:26

## 2019-06-10 RX ADMIN — SODIUM CHLORIDE 500 ML: 9 INJECTION, SOLUTION INTRAVENOUS at 13:34

## 2019-06-10 RX ADMIN — ACETAMINOPHEN 650 MG: 325 TABLET, FILM COATED ORAL at 15:18

## 2019-06-10 RX ADMIN — POLYETHYLENE GLYCOL 3350 1 PACKET: 17 POWDER, FOR SOLUTION ORAL at 22:39

## 2019-06-10 RX ADMIN — ATORVASTATIN CALCIUM 40 MG: 40 TABLET, FILM COATED ORAL at 17:25

## 2019-06-10 RX ADMIN — IOHEXOL 75 ML: 350 INJECTION, SOLUTION INTRAVENOUS at 11:11

## 2019-06-10 RX ADMIN — TRAMADOL HYDROCHLORIDE 50 MG: 50 TABLET, FILM COATED ORAL at 22:34

## 2019-06-10 RX ADMIN — DONEPEZIL HYDROCHLORIDE 5 MG: 5 TABLET, FILM COATED ORAL at 20:46

## 2019-06-10 RX ADMIN — SENNOSIDES,DOCUSATE SODIUM 2 TABLET: 8.6; 5 TABLET, FILM COATED ORAL at 17:25

## 2019-06-10 ASSESSMENT — COGNITIVE AND FUNCTIONAL STATUS - GENERAL
EATING MEALS: A LITTLE
MOVING TO AND FROM BED TO CHAIR: A LITTLE
MOBILITY SCORE: 22
SUGGESTED CMS G CODE MODIFIER MOBILITY: CJ
TOILETING: A LITTLE
DAILY ACTIVITIY SCORE: 19
PERSONAL GROOMING: A LITTLE
MOVING FROM LYING ON BACK TO SITTING ON SIDE OF FLAT BED: A LITTLE
DRESSING REGULAR UPPER BODY CLOTHING: A LITTLE
HELP NEEDED FOR BATHING: A LITTLE
SUGGESTED CMS G CODE MODIFIER DAILY ACTIVITY: CK

## 2019-06-10 ASSESSMENT — PATIENT HEALTH QUESTIONNAIRE - PHQ9
2. FEELING DOWN, DEPRESSED, IRRITABLE, OR HOPELESS: NOT AT ALL
1. LITTLE INTEREST OR PLEASURE IN DOING THINGS: NOT AT ALL
SUM OF ALL RESPONSES TO PHQ9 QUESTIONS 1 AND 2: 0

## 2019-06-10 ASSESSMENT — LIFESTYLE VARIABLES
ALCOHOL_USE: NO
EVER_SMOKED: NEVER
EVER_SMOKED: NEVER

## 2019-06-10 ASSESSMENT — COPD QUESTIONNAIRES
COPD SCREENING SCORE: 2
DO YOU EVER COUGH UP ANY MUCUS OR PHLEGM?: NO/ONLY WITH OCCASIONAL COLDS OR INFECTIONS
HAVE YOU SMOKED AT LEAST 100 CIGARETTES IN YOUR ENTIRE LIFE: NO/DON'T KNOW
DURING THE PAST 4 WEEKS HOW MUCH DID YOU FEEL SHORT OF BREATH: NONE/LITTLE OF THE TIME

## 2019-06-10 NOTE — FLOWSHEET NOTE
06/10/19 1518   Education   Education Yes - Pt. / Family has been Instructed in use of Respiratory Equipment   Incentive Spirometry Group   Incentive Spirometry Instruction Yes   Breathing Exercises Yes   Incentive Spirometer Volume 1250 mL   Incentive Spirometer Date Last Changed 06/10/19   Incentive Spirometer Next Change Date (Q 30 Days) 07/10/19   Respiratory WDL   Respiratory (WDL) X   Chest Exam   Respiration 16   Heart Rate (Monitored) 68   Breath Sounds   RUL Breath Sounds Clear   RML Breath Sounds Clear   RLL Breath Sounds Diminished   AMISH Breath Sounds Clear   LLL Breath Sounds Diminished   Secretions   Cough Congested;Non Productive;Strong   Oximetry   #Pulse Oximetry (Single Determination) Yes   Oxygen   Home O2 Use Prior To Admission? No   Pulse Oximetry 98 %   O2 (LPM) 0

## 2019-06-10 NOTE — H&P
Hospital Medicine History & Physical Note    Date of Service  6/10/2019    Primary Care Physician  COCO Gardner.    Consultants  None    Code Status  Full Code    Chief Complaint  Chief Complaint   Patient presents with   • Shortness of Breath     SOB x 2 weeks  wheezing while sleeping  Fell last week and has R shoulder pain  Overnight care in [place]       History of Presenting Illness  Shanae Rausch is a very pleasant 84 y.o. female with a past medical history of Dementia, Mood disorder, HLD presented to the emergency room on 6/10/2019 shortness of breath and chest pressure that initially started 2 weeks ago has been intermittent, progressively worse over the past 2 days.  Fortunately patient is a poor historian because of her dementia.  But according to her family patient has been having shortness of breath during ambulation, and he felt like she has noisy breathing at night when she sleeping.  Overall she is very fatigued.  Denies having any fevers chills, productive cough, denies having any dysuria or diarrhea.  Denies having any orthopnea or PND.  Patient was recently in [place] last week and was apparently hospitalized there as well fortunately did not know what tests were done.      Review of Systems  Review of Systems   Unable to perform ROS: Dementia       Past Medical History  Past Medical History:   Diagnosis Date   • ASCUS on Pap smear 5/8/09   • H. pylori infection 3/17/08    treated with prevpac   • Pancreatitis 2002   • Allergic rhinitis    • Arthritis     hand pain and numbness   • DDD (degenerative disc disease)    • Diverticulitis    • Fatty liver    • Hyperlipidemia LDL goal < 100    • Lumbar stenosis     MRI lumbar spine 2011 showed multilevel OA and multiple bulging with stenosis   • Metabolic syndrome    • Osteoporosis    • Peripheral neuropathy    • Shingles    • Vitamin d deficiency        Surgical History   has a past surgical history that includes cholecystectomy and lumbar disc  replacement.    Family History  Denies family history of heart diease    Social History   reports that she has never smoked. She has never used smokeless tobacco. She reports that she does not drink alcohol or use drugs.    Allergies  Allergies   Allergen Reactions   • Etodolac Anaphylaxis     dizziness   • Lactose Diarrhea     intolerant       Medications  Prior to Admission medications    Medication Sig Start Date End Date Taking? Authorizing Provider   pregabalin (LYRICA) 100 MG Cap Take 100 mg by mouth 2 times a day.   Yes Physician Outpatient   memantine (NAMENDA) 10 MG Tab Take 10 mg by mouth every day.   Yes Physician Outpatient   donepezil (ARICEPT) 5 MG Tab Take 5 mg by mouth every evening.   Yes Physician Outpatient   levothyroxine (SYNTHROID) 25 MCG Tab TAKE 1 TABLET BY MOUTH ONCE DAILY IN THE MORNING ON AN EMPTY STOMACH 4/9/19   MAY GardnerPVIDYA   escitalopram (LEXAPRO) 20 MG tablet TAKE ONE TABLET BY MOUTH ONCE DAILY 4/3/19   MAY [email or web address].[email or web address]   HYDROcodone/acetaminophen (NORCO)  MG Tab Take 1 Tab by mouth every 8 hours as needed for up to 30 days. 5/21/19 6/20/19  MUKUL Gardner   LINZESS 145 MCG Cap TAKE ONE CAPSULE BY MOUTH ONCE DAILY 3/4/19   MAY GardnerP.LIZA   alendronate (FOSAMAX) 70 MG Tab Take 1 Tab by mouth every 7 days. 1/24/19   MAY GardnerPVIDYA   atorvastatin (LIPITOR) 40 MG Tab Take 1 Tab by mouth every day. 12/17/18   MAY GardnerPVIDYA   montelukast (SINGULAIR) 10 MG Tab Take 1 Tab by mouth every day. 10/21/15   MAY GardnerP.LIZA   montelukast (SINGULAIR) 10 MG TABS Take 1 Tab by mouth every day. 4/14/11   MAY GardnerP.LIZA       Physical Exam  Temp:  [36.7 °C (98.1 °F)] 36.7 °C (98.1 °F)  Pulse:  [74-81] 74  Resp:  [18] 18  BP: (114)/(62) 114/62  SpO2:  [94 %-97 %] 94 %  Physical Exam   Constitutional: She appears well-developed and well-nourished. No distress.   HENT:   Head: Normocephalic and atraumatic.   Mouth/Throat: No oropharyngeal exudate.    Eyes: Pupils are equal, round, and reactive to light. Conjunctivae are normal. Right eye exhibits no discharge. No scleral icterus.   Neck: Neck supple. No JVD present. No thyromegaly present.   Cardiovascular: Normal rate and intact distal pulses.    No murmur heard.  Pulmonary/Chest: Effort normal and breath sounds normal. No stridor. No respiratory distress. She has no wheezes. She has no rales.   Abdominal: Soft. Bowel sounds are normal. She exhibits no distension. There is no tenderness. There is no rebound.   Musculoskeletal: Normal range of motion. She exhibits no edema.   Neurological: She is alert. No cranial nerve deficit.   Skin: Skin is warm. She is not diaphoretic. No erythema.   Psychiatric: She has a normal mood and affect. Her behavior is normal. Thought content normal.   Nursing note and vitals reviewed.      Laboratory:  Recent Labs      06/10/19   0922   WBC  5.4   RBC  4.18*   HEMOGLOBIN  12.8   HEMATOCRIT  39.3   MCV  94.0   MCH  30.6   MCHC  32.6*   RDW  52.2*   PLATELETCT  169   MPV  11.2     Recent Labs      06/10/19   0922   SODIUM  140   POTASSIUM  3.9   CHLORIDE  106   CO2  23   GLUCOSE  108*   BUN  25*   CREATININE  0.74   CALCIUM  8.6     Recent Labs      06/10/19   0922   ALTSGPT  16   ASTSGOT  20   ALKPHOSPHAT  78   TBILIRUBIN  0.6   GLUCOSE  108*         Recent Labs      06/10/19   0922   TROPONINI  <0.02   BNPBTYPENAT  63       Urinalysis:          Imaging:  DX-SHOULDER 2+ RIGHT   Final Result      1.  No evidence of acute fracture or dislocation.      2.  Degenerative change of the glenohumeral and acromioclavicular joints.      3.  Chondrocalcinosis.      CT-CTA CHEST PULMONARY ARTERY W/ RECONS   Final Result      No evidence of pulmonary embolus.      Bronchiectasis with mucus plugging in the right middle lobe.      Scarring or interstitial lung disease in bilateral lower lobes.            DX-CHEST-PORTABLE (1 VIEW)   Final Result      Linear atelectasis within the lung bases  bilaterally.          Assessment/Plan:  I anticipate this patient is appropriate for observation status at this time.    * Exertional dyspnea   Assessment & Plan    CTA PE neg for pulmonary embolisms and pleural effucions  BNP 63  Patient is a poor historian, denies shortness of breath  Will r/o cardiac etiology  Rule out cardiac cause.  Initial EKG and troponin's are negative, we will continue to trend serial troponins, If negative we will proceed with a NM Stress test for risk stratification.  We will monitor patient on telemetry,  Aspirin has been ordered.  TSH Pending  Lipid panel ordered and pending        Dementia   Assessment & Plan    Continue aricept and memantine      Hyperlipidemia LDL goal <100- (present on admission)   Assessment & Plan    Resume home dose of lipitor  Check fasting lipid panel in the am      Peripheral neuropathy- (present on admission)   Assessment & Plan    Resume lyrica.         VTE prophylaxis: Prophylaxis: lovenox

## 2019-06-10 NOTE — CARE PLAN
Problem: Safety  Goal: Will remain free from falls  Outcome: PROGRESSING AS EXPECTED  Discussed with family and patient about her risk for fall. Informed patient to use call light if needing to get out of bed.     Problem: Knowledge Deficit  Goal: Knowledge of disease process/condition, treatment plan, diagnostic tests, and medications will improve  Outcome: PROGRESSING AS EXPECTED  Discussed plan of care with patient and family member. All questions answered.

## 2019-06-10 NOTE — ED PROVIDER NOTES
ED Provider Note    CHIEF COMPLAINT  Chief Complaint   Patient presents with   • Shortness of Breath     SOB x 2 weeks  wheezing while sleeping  Fell last week and has R shoulder pain  Overnight care in [facility]  Marii Rausch is a 84 y.o. female who presents with dyspnea.  History is limited because of patient's dementia as well as K[place]z-speaking only.  Daughter and granddaughter assist .  Started 2 weeks ago.  Seems worse over the last 2 days.  She seems more short of breath when she tries to walk.  But also had noisy breathing last night when she was sleeping.  She seems more tired than normal.  She has not had a cough.  No fever.  She has not complained of chest pain.  Not been known to have new leg swelling, but she has been complaining of right shoulder pain.  She has a known history of osteoarthritis.    Patient was in [place] last week.  She went to the hospital while there.  She was admitted overnight.  Family does not know what tests were performed.  She was told she had a problem with her shoulder, but the family is not sure what.  They do state that she had an injection into the shoulder joint    REVIEW OF SYSTEMS  As per HPI, otherwise a 10 point review of systems is negative    PAST MEDICAL HISTORY  Past Medical History:   Diagnosis Date   • Allergic rhinitis    • Arthritis     hand pain and numbness   • ASCUS on Pap smear 5/8/09   • DDD (degenerative disc disease)    • Diverticulitis    • Fatty liver    • H. pylori infection 3/17/08    treated with prevpac   • Hyperlipidemia LDL goal < 100    • Lumbar stenosis     MRI lumbar spine 2011 showed multilevel OA and multiple bulging with stenosis   • Metabolic syndrome    • Osteoporosis    • Pancreatitis 2002   • Peripheral neuropathy    • Shingles    • Vitamin d deficiency        SOCIAL HISTORY  Social History   Substance Use Topics   • Smoking status: Never Smoker   • Smokeless tobacco: Never Used   • Alcohol use No      Comment:  Rarely       SURGICAL HISTORY  Past Surgical History:   Procedure Laterality Date   • CHOLECYSTECTOMY     • LUMBAR DISC REPLACEMENT         CURRENT MEDICATIONS  Home Medications     Reviewed by Donavan Woody (Pharmacy Tech) on 06/10/19 at 1049  Med List Status: Complete   Medication Last Dose Status   alendronate (FOSAMAX) 70 MG Tab unknown Active   atorvastatin (LIPITOR) 40 MG Tab 6/9/2019 Active   donepezil (ARICEPT) 5 MG Tab 6/9/2019 Active   escitalopram (LEXAPRO) 20 MG tablet 6/9/2019 Active   HYDROcodone/acetaminophen (NORCO)  MG Tab unknown Active   levothyroxine (SYNTHROID) 25 MCG Tab 6/9/2019 Active   LINZESS 145 MCG Cap 6/9/2019 Active   memantine (NAMENDA) 10 MG Tab 6/9/2019 Active   montelukast (SINGULAIR) 10 MG Tab 6/9/2019 Active   pregabalin (LYRICA) 100 MG Cap 6/9/2019 Active                ALLERGIES  Allergies   Allergen Reactions   • Etodolac Anaphylaxis     dizziness   • Lactose Diarrhea     intolerant       PHYSICAL EXAM  VITAL SIGNS: /62   Pulse 74   Temp 36.7 °C (98.1 °F)   Resp 18   Ht 1.524 m (5')   Wt 67.9 kg (149 lb 11.1 oz)   SpO2 94%   BMI 29.23 kg/m²    Constitutional: Awake and alert.  Elderly female  HENT:  Atraumatic, Normocephalic.Oropharynx dry mucus membranes, Nose normal inspection.   Eyes: Normal inspection  Neck: Supple  Cardiovascular: Normal heart rate, Normal rhythm.  Symmetric peripheral pulses.   Thorax & Lungs: No respiratory distress, No wheezing, No rales, No rhonchi, No chest tenderness.   Abdomen: Bowel sounds normal, soft, non-distended, nontender, no mass  Skin: Warm, Dry, No rash.   Back: No tenderness, No CVA tenderness.   Extremities: No clubbing, cyanosis, edema, no Homans or cords.  Limited range of motion of the right shoulder.  Neurologic: Grossly normal   Psychiatric: Anxious appearing    RADIOLOGY/PROCEDURES  DX-SHOULDER 2+ RIGHT   Final Result      1.  No evidence of acute fracture or dislocation.      2.  Degenerative change of the  glenohumeral and acromioclavicular joints.      3.  Chondrocalcinosis.      CT-CTA CHEST PULMONARY ARTERY W/ RECONS   Final Result      No evidence of pulmonary embolus.      Bronchiectasis with mucus plugging in the right middle lobe.      Scarring or interstitial lung disease in bilateral lower lobes.            DX-CHEST-PORTABLE (1 VIEW)   Final Result      Linear atelectasis within the lung bases bilaterally.           Imaging is interpreted by radiologist    Labs:  Results for orders placed or performed during the hospital encounter of 06/10/19   CBC WITH DIFFERENTIAL   Result Value Ref Range    WBC 5.4 4.8 - 10.8 K/uL    RBC 4.18 (L) 4.20 - 5.40 M/uL    Hemoglobin 12.8 12.0 - 16.0 g/dL    Hematocrit 39.3 37.0 - 47.0 %    MCV 94.0 81.4 - 97.8 fL    MCH 30.6 27.0 - 33.0 pg    MCHC 32.6 (L) 33.6 - 35.0 g/dL    RDW 52.2 (H) 35.9 - 50.0 fL    Platelet Count 169 164 - 446 K/uL    MPV 11.2 9.0 - 12.9 fL    Neutrophils-Polys 45.20 44.00 - 72.00 %    Lymphocytes 35.60 22.00 - 41.00 %    Monocytes 9.70 0.00 - 13.40 %    Eosinophils 7.60 (H) 0.00 - 6.90 %    Basophils 1.50 0.00 - 1.80 %    Immature Granulocytes 0.40 0.00 - 0.90 %    Nucleated RBC 0.00 /100 WBC    Neutrophils (Absolute) 2.43 2.00 - 7.15 K/uL    Lymphs (Absolute) 1.91 1.00 - 4.80 K/uL    Monos (Absolute) 0.52 0.00 - 0.85 K/uL    Eos (Absolute) 0.41 0.00 - 0.51 K/uL    Baso (Absolute) 0.08 0.00 - 0.12 K/uL    Immature Granulocytes (abs) 0.02 0.00 - 0.11 K/uL    NRBC (Absolute) 0.00 K/uL   COMP METABOLIC PANEL   Result Value Ref Range    Sodium 140 135 - 145 mmol/L    Potassium 3.9 3.6 - 5.5 mmol/L    Chloride 106 96 - 112 mmol/L    Co2 23 20 - 33 mmol/L    Anion Gap 11.0 0.0 - 11.9    Glucose 108 (H) 65 - 99 mg/dL    Bun 25 (H) 8 - 22 mg/dL    Creatinine 0.74 0.50 - 1.40 mg/dL    Calcium 8.6 8.4 - 10.2 mg/dL    AST(SGOT) 20 12 - 45 U/L    ALT(SGPT) 16 2 - 50 U/L    Alkaline Phosphatase 78 30 - 99 U/L    Total Bilirubin 0.6 0.1 - 1.5 mg/dL    Albumin 3.4 3.2  - 4.9 g/dL    Total Protein 6.0 6.0 - 8.2 g/dL    Globulin 2.6 1.9 - 3.5 g/dL    A-G Ratio 1.3 g/dL   TROPONIN   Result Value Ref Range    Troponin I <0.02 0.00 - 0.04 ng/mL   BTYPE NATRIURETIC PEPTIDE   Result Value Ref Range    B Natriuretic Peptide 63 0 - 100 pg/mL   ESTIMATED GFR   Result Value Ref Range    GFR If African American >60 >60 mL/min/1.73 m 2    GFR If Non African American >60 >60 mL/min/1.73 m 2   EKG (NOW)   Result Value Ref Range    Report       [facility]    Test Date:  2019-06-10  Pt Name:    MURPHY FLORES     Department: [facility]  MRN:        [id number]                      [facility]  Gender:     Female                       Technician: DEONDRE  :        [id number]                   Requested By:RAHEL HARDIN  Order #:    [id number]                    Reading MD: RAHEL HARDIN MD    Measurements  Intervals                                Axis  Rate:       75                           P:          45  CO:         156                          QRS:        5  QRSD:       76                           T:          22  QT:         392  QTc:        438    Interpretive Statements  SINUS RHYTHM  Compared to ECG 2018 18:58:20  No significant changes    Electronically Signed On 6- 11:48:17 PDT by RAHEL HARDIN MD         Medications   NS infusion 500 mL (not administered)   iohexol (OMNIPAQUE) 350 mg/mL (75 mL Intravenous Given 6/10/19 1111)     Patient was given IV fluids in preparation for contrast-enhanced CT scan for renal protection.  Oral fluids were not rapid enough.  Stable on recheck.    COURSE & MEDICAL DECISION MAKING  Patient presents with unexplained dyspnea.  She also had a fall and complained of some right shoulder pain.  She had recent travel.  Work-up was obtained.  EKG does not show any acute findings.  CT pulmonary angiogram was negative.  Initial troponin negative and other laboratory data unremarkable.  She did  have mildly elevated BUN consistent with some degree of dehydration was given IV fluids as mentioned above.  At this point she will be admitted to the hospital for further work-up of dyspnea.  I consulted Dr. Byers for admission.      FINAL IMPRESSION  1.  Dyspnea  2.  Right shoulder pain, suspected osteoarthritis      This dictation was created using voice recognition software. The accuracy of the dictation is limited to the abilities of the software.  The nursing notes were reviewed and certain aspects of this information were incorporated into this note.      Electronically signed by: Franklyn Wynne, 6/10/2019 11:49 AM

## 2019-06-10 NOTE — ASSESSMENT & PLAN NOTE
CTA PE neg for pulmonary embolisms and pleural effucions  BNP 63  Patient is a poor historian, denies shortness of breath  Will r/o cardiac etiology  Rule out cardiac cause.  Initial EKG and troponin's are negative, we will continue to trend serial troponins, If negative we will proceed with a NM Stress test for risk stratification.  We will monitor patient on telemetry,  Aspirin has been ordered.  TSH Pending  Lipid panel ordered and pending

## 2019-06-10 NOTE — PROGRESS NOTES
2 RN skin check complete.   Devices in place tele box.  Skin assessed under devices tele box.  Confirmed pressure ulcers found on n/a.  New potential pressure ulcers noted on none. Wound consult placed none.  The following interventions in place: pt turns self.     Pt's skin in fragile and bruising on left buttocks.

## 2019-06-10 NOTE — ED NOTES
Care of pt for transfer only.  Report called to floor by RN.  Transferring via gurney w/ tele/O2.

## 2019-06-10 NOTE — ED TRIAGE NOTES
Fell last week and injured R shoulder  Stayed in hospital in [place] last week  SOB x 2 weeks  Denies cough  EKG done in triage room

## 2019-06-11 ENCOUNTER — APPOINTMENT (OUTPATIENT)
Dept: RADIOLOGY | Facility: MEDICAL CENTER | Age: 84
End: 2019-06-11
Attending: HOSPITALIST
Payer: MEDICARE

## 2019-06-11 VITALS
RESPIRATION RATE: 17 BRPM | DIASTOLIC BLOOD PRESSURE: 46 MMHG | TEMPERATURE: 97.7 F | WEIGHT: 151.24 LBS | SYSTOLIC BLOOD PRESSURE: 110 MMHG | HEIGHT: 62 IN | OXYGEN SATURATION: 100 % | HEART RATE: 64 BPM | BODY MASS INDEX: 27.83 KG/M2

## 2019-06-11 PROBLEM — J47.9 BRONCHIECTASIS (HCC): Status: ACTIVE | Noted: 2019-06-11

## 2019-06-11 LAB
ALBUMIN SERPL BCP-MCNC: 3.3 G/DL (ref 3.2–4.9)
ALBUMIN/GLOB SERPL: 1.3 G/DL
ALP SERPL-CCNC: 61 U/L (ref 30–99)
ALT SERPL-CCNC: 14 U/L (ref 2–50)
ANION GAP SERPL CALC-SCNC: 8 MMOL/L (ref 0–11.9)
AST SERPL-CCNC: 24 U/L (ref 12–45)
BASOPHILS # BLD AUTO: 1.4 % (ref 0–1.8)
BASOPHILS # BLD: 0.08 K/UL (ref 0–0.12)
BILIRUB SERPL-MCNC: 0.5 MG/DL (ref 0.1–1.5)
BUN SERPL-MCNC: 15 MG/DL (ref 8–22)
CALCIUM SERPL-MCNC: 8.6 MG/DL (ref 8.4–10.2)
CHLORIDE SERPL-SCNC: 108 MMOL/L (ref 96–112)
CHOLEST SERPL-MCNC: 136 MG/DL (ref 100–199)
CO2 SERPL-SCNC: 27 MMOL/L (ref 20–33)
CREAT SERPL-MCNC: 0.72 MG/DL (ref 0.5–1.4)
EOSINOPHIL # BLD AUTO: 0.51 K/UL (ref 0–0.51)
EOSINOPHIL NFR BLD: 9.2 % (ref 0–6.9)
ERYTHROCYTE [DISTWIDTH] IN BLOOD BY AUTOMATED COUNT: 54.2 FL (ref 35.9–50)
GLOBULIN SER CALC-MCNC: 2.6 G/DL (ref 1.9–3.5)
GLUCOSE SERPL-MCNC: 95 MG/DL (ref 65–99)
HCT VFR BLD AUTO: 43.8 % (ref 37–47)
HDLC SERPL-MCNC: 50 MG/DL
HGB BLD-MCNC: 14 G/DL (ref 12–16)
IMM GRANULOCYTES # BLD AUTO: 0.02 K/UL (ref 0–0.11)
IMM GRANULOCYTES NFR BLD AUTO: 0.4 % (ref 0–0.9)
LDLC SERPL CALC-MCNC: 67 MG/DL
LYMPHOCYTES # BLD AUTO: 2.53 K/UL (ref 1–4.8)
LYMPHOCYTES NFR BLD: 45.8 % (ref 22–41)
MCH RBC QN AUTO: 30.7 PG (ref 27–33)
MCHC RBC AUTO-ENTMCNC: 32 G/DL (ref 33.6–35)
MCV RBC AUTO: 96.1 FL (ref 81.4–97.8)
MONOCYTES # BLD AUTO: 0.59 K/UL (ref 0–0.85)
MONOCYTES NFR BLD AUTO: 10.7 % (ref 0–13.4)
NEUTROPHILS # BLD AUTO: 1.79 K/UL (ref 2–7.15)
NEUTROPHILS NFR BLD: 32.5 % (ref 44–72)
NRBC # BLD AUTO: 0 K/UL
NRBC BLD-RTO: 0 /100 WBC
PLATELET # BLD AUTO: 172 K/UL (ref 164–446)
PMV BLD AUTO: 12.3 FL (ref 9–12.9)
POTASSIUM SERPL-SCNC: 4.3 MMOL/L (ref 3.6–5.5)
PROT SERPL-MCNC: 5.9 G/DL (ref 6–8.2)
RBC # BLD AUTO: 4.56 M/UL (ref 4.2–5.4)
SODIUM SERPL-SCNC: 143 MMOL/L (ref 135–145)
TRIGL SERPL-MCNC: 94 MG/DL (ref 0–149)
WBC # BLD AUTO: 5.5 K/UL (ref 4.8–10.8)

## 2019-06-11 PROCEDURE — A9270 NON-COVERED ITEM OR SERVICE: HCPCS | Performed by: HOSPITALIST

## 2019-06-11 PROCEDURE — 700111 HCHG RX REV CODE 636 W/ 250 OVERRIDE (IP): Performed by: HOSPITALIST

## 2019-06-11 PROCEDURE — 99217 PR OBSERVATION CARE DISCHARGE: CPT | Mod: 25 | Performed by: HOSPITALIST

## 2019-06-11 PROCEDURE — 99497 ADVNCD CARE PLAN 30 MIN: CPT | Performed by: HOSPITALIST

## 2019-06-11 PROCEDURE — 80061 LIPID PANEL: CPT

## 2019-06-11 PROCEDURE — 80053 COMPREHEN METABOLIC PANEL: CPT

## 2019-06-11 PROCEDURE — 700102 HCHG RX REV CODE 250 W/ 637 OVERRIDE(OP): Performed by: HOSPITALIST

## 2019-06-11 PROCEDURE — 85025 COMPLETE CBC W/AUTO DIFF WBC: CPT

## 2019-06-11 PROCEDURE — 73502 X-RAY EXAM HIP UNI 2-3 VIEWS: CPT | Mod: LT

## 2019-06-11 PROCEDURE — G0378 HOSPITAL OBSERVATION PER HR: HCPCS

## 2019-06-11 PROCEDURE — 96372 THER/PROPH/DIAG INJ SC/IM: CPT

## 2019-06-11 RX ORDER — LEVOFLOXACIN 500 MG/1
500 TABLET, FILM COATED ORAL EVERY 24 HOURS
Status: DISCONTINUED | OUTPATIENT
Start: 2019-06-11 | End: 2019-06-11 | Stop reason: HOSPADM

## 2019-06-11 RX ORDER — LEVOFLOXACIN 500 MG/1
500 TABLET, FILM COATED ORAL EVERY 24 HOURS
Qty: 7 TAB | Refills: 0 | Status: SHIPPED | OUTPATIENT
Start: 2019-06-11 | End: 2019-06-18

## 2019-06-11 RX ORDER — POLYETHYLENE GLYCOL 3350 17 G/17G
17 POWDER, FOR SOLUTION ORAL DAILY
Qty: 30 EACH | Refills: 2 | Status: SHIPPED | OUTPATIENT
Start: 2019-06-11

## 2019-06-11 RX ADMIN — ENOXAPARIN SODIUM 40 MG: 100 INJECTION SUBCUTANEOUS at 06:10

## 2019-06-11 RX ADMIN — PREGABALIN 100 MG: 100 CAPSULE ORAL at 06:06

## 2019-06-11 RX ADMIN — TRAMADOL HYDROCHLORIDE 50 MG: 50 TABLET, FILM COATED ORAL at 09:50

## 2019-06-11 RX ADMIN — MAGNESIUM HYDROXIDE 30 ML: 400 SUSPENSION ORAL at 06:09

## 2019-06-11 RX ADMIN — LEVOFLOXACIN 500 MG: 500 TABLET, FILM COATED ORAL at 09:50

## 2019-06-11 RX ADMIN — SENNOSIDES,DOCUSATE SODIUM 2 TABLET: 8.6; 5 TABLET, FILM COATED ORAL at 06:06

## 2019-06-11 RX ADMIN — LEVOTHYROXINE SODIUM 25 MCG: 25 TABLET ORAL at 06:07

## 2019-06-11 RX ADMIN — ESCITALOPRAM OXALATE 20 MG: 10 TABLET ORAL at 06:06

## 2019-06-11 RX ADMIN — MONTELUKAST SODIUM 10 MG: 10 TABLET, FILM COATED ORAL at 06:07

## 2019-06-11 RX ADMIN — MEMANTINE HYDROCHLORIDE 10 MG: 10 TABLET ORAL at 06:07

## 2019-06-11 NOTE — PROGRESS NOTES
1915 Report received from Flory BUTTS. Plan of care discussed. Patient resting comfortably in bed, per report, patient is a poor historian and forgetful. Safety precautions in place.    2030 Patient set off bed alarm stating she would like to ambulate to the bathroom. Assisted patient with FWW. Patient has wide based stance and leans forward when using the FWW, patient ambulated to the bathroom and back to bed. Assessment completed, patient is alert and oriented x3, patient was not able to state the year, day or who the president is. Patient c/o 8/10 left hip pain, patient has a bruise on hip and stated it was from a fall she had.

## 2019-06-11 NOTE — PROGRESS NOTES
Telemetry Shift Summary    Rhythm SR  HR Range 70s  Ectopy Rare PAC  Measurements 0.12/0.06/0.42        Normal Values  Rhythm SR  HR Range    Measurements 0.12-0.20 / 0.06-0.10  / 0.30-0.52

## 2019-06-11 NOTE — PROGRESS NOTES
Advanced care planning in addition to e/m time for discharge today. I spent an additional 19 minutes discussing code status with her today wiht her daughter present. We discussed intubation, cpr, tube feeding and desire for care. She elected to be a DNR. No tube feeding. This conversation was done in [place]ian. We filled out a POLST form and signed it today.

## 2019-06-11 NOTE — PROGRESS NOTES
Report received from BECKIE Burnett. Patient resting comfortably in bed. Declines any needs at this time. Call light in reach. Bed alarm on.   Plan for stress test at 1115. Patient and family aware, translation done by BECKIE Burnett.     Down to Xray with transport. Daughter with them.

## 2019-06-11 NOTE — DISCHARGE INSTRUCTIONS
Take prunes every day. Start taking Miralax everyday. Follow up with primary care about Code status and constipation.       Bronquiectasia  (Bronchiectasis)  La bronquiectasia es rell enfermedad en la cual las vías respiratorias (bronquios) están dañadas y ensanchadas. [place] dificulta la capacidad de los pulmones para eliminar la mucosidad. Rj resultado, la mucosidad se acumula en las vías respiratorias y esto generalmente causa infecciones pulmonares. La infección puede causar inflamación en las vías respiratorias, lo que puede además debilitar y dañar los bronquios.  CAUSAS  La bronquiectasia puede estar presente al nacer (congénita) o aparecer en rell etapa posterior de la conner. Generalmente no hay causa evidente. Algunas causas comunes son:  · Fibrosis quística.  · Infecciones pulmonares recurrentes (rj la neumonía, la tuberculosis o las infecciones por hongos).  · Cuerpos extraños u otras obstrucciones en los pulmones.  · La inhalación de líquido, comida u otros objetos extraños (aspiración).  SIGNOS Y SÍNTOMAS  Los síntomas más comunes son:  · Tos diaria con mucosidad que tiene rell duración de más de 3 semanas.  · Infecciones pulmonares recurrentes (rj la neumonía, la tuberculosis o las infecciones por hongos).  · Dificultad para respirar o sibilancias.  · Debilidad y fatiga.  DIAGNÓSTICO  Hay diferentes estudios que pueden hacerse para diagnosticar bronquiectasias. Entre ellos:  · Radiografías o tomografías de tórax.  · Estudios de la respiración para determinar el funcionamiento de los pulmones.  · Cultivos de la expectoración para diagnosticar infecciones.  · Análisis de jerardo y otros estudios para verificar si hay enfermedades o causas relacionadas, rj la fibrosis quística.  TRATAMIENTO  El tratamiento depende de la gravedad de la enfermedad. Se pueden administrar medicamentos para aflojar la mucosidad y poder expectorarla (expectorantes), para relajar los músculos de las vías respiratorias  (broncodilatadores) o para prevenir o tratar las infecciones (antibióticos). Podrán indicarle fisioterapia para eliminar la mucosidad de los pulmones. En los casos graves, podrá ser necesaria rell cirugía para extirpar la abigial afectada del pulmón.  INSTRUCCIONES PARA EL CUIDADO EN EL HOGAR  · Descanse lo suficiente.  · [place] solo medicamentos de venta lisa o recetados, según las indicaciones del médico. Si le recetaron antibióticos, tómelos según las indicaciones. Termínelos aunque comience a sentirse mejor.  · Evite los sedantes y los antihistamínicos excepto que gifford médico le indique lo contrario. Estos medicamentos favorecen el espesamiento de la mucosidad en los pulmones.  · Realice ejercicios de respiración o técnicas para limpiar los pulmones, según las indicaciones de gifford médico.  · Marce suficiente líquido para mantener la orina indiana o de color amarillo pálido.  · Considere usar un vaporizador o humidificador en gifford habitación o en gifford casa, para aflojar las secreciones.  · Si la tos empeora kalee la noche, trate de dormir semisentado en rell reposera o use un par de almohadas.  · Evite el humo del cigarrillo y los irritantes pulmonares. Si fuma, abandone el hábito.  · Permanezca en el interior cuando los niveles de polución y ozono melvi elevados.  · Manténgase al día con las vacunas.  · Concurra a las consultas de control con gifford médico según las indicaciones.  SOLICITE ATENCIÓN MÉDICA SI:  · Expectora mucosidad más espesa (esputo) de color amarillo o vannesa.  · Tiene fiebre o síntomas persistentes kalee más de 2 a 3 días.  · No puede controlar la tos, y no puede dormir.  SOLICITE ATENCIÓN MÉDICA DE INMEDIATO SI:  · Tose y escupe jerardo.  · Siente falta de aire o dolor en el pecho.  · El dolor aumenta o no puede controlarlo con los medicamentos.  · Tiene fiebre y los síntomas empeoran repentinamente.  ASEGÚRESE DE QUE:  · Comprende estas instrucciones.  · Controlará gifford afección.  · Recibirá ayuda de inmediato  si no mejora o si empeora.  Esta información no tiene calista fin reemplazar el consejo del médico. Asegúrese de hacerle al médico cualquier pregunta que tenga.  Document Released: 04/05/2010 Document Revised: 04/10/2017 Document Reviewed: 06/25/2014  [employer] Interactive Patient Education © 2017 [employer]      Bronchiectasis  Bronchiectasis is a condition in which the airways (bronchi) are damaged and widened. This makes it difficult for the lungs to get rid of mucus. As a result, mucus gathers in the airways, and this often leads to lung infections. Infection can cause inflammation in the airways, which may further weaken and damage the bronchi.  What are the causes?  Bronchiectasis may be present at birth (congenital) or may develop later in life. Sometimes there is no apparent cause. Some common causes include:  · Cystic fibrosis.  · Recurrent lung infections (such as pneumonia, tuberculosis, or fungal infections).  · Foreign bodies or other blockages in the lungs.  · Breathing in fluid, food, or other foreign objects (aspiration).  What are the signs or symptoms?  Common symptoms include:  · A daily cough that brings up mucus and lasts for more than 3 weeks.  · Frequent lung infections (such as pneumonia, tuberculosis, or fungal infections).  · Shortness of breath and wheezing.  · Weakness and fatigue.  How is this diagnosed?  Various tests may be done to help diagnose bronchiectasis. Tests may include:  · Chest X-rays or CT scans.  · Breathing tests to help determine how your lungs are working.  · Sputum cultures to check for infection.  · Blood tests and other tests to check for related diseases or causes, such as cystic fibrosis.  How is this treated?  Treatment varies depending on the severity of the condition. Medicines may be given to loosen the mucus to be coughed up (expectorants), to relax the muscles of the air passages (bronchodilators), or to prevent or treat infections (antibiotics). Physical  therapy methods may be recommended to help clear mucus from the lungs. For severe cases, surgery may be done to remove the affected part of the lung.  Follow these instructions at home:  · Get plenty of rest.  · Only take over-the-counter or prescription medicines as directed by your health care provider. If antibiotic medicines were prescribed, take them as directed. Finish them even if you start to feel better.  · Avoid sedatives and antihistamines unless otherwise directed by your health care provider. These medicines tend to thicken the mucus in the lungs.  · Perform any breathing exercises or techniques to clear the lungs as directed by your health care provider.  · Drink enough fluids to keep your urine clear or pale yellow.  · Consider using a cold steam vaporizer or humidifier in your room or home to help loosen secretions.  · If the cough is worse at night, try sleeping in a semi-upright position in a recliner or using a couple of pillows.  · Avoid cigarette smoke and lung irritants. If you smoke, quit.  · Stay inside when pollution and ozone levels are high.  · Stay current with vaccinations and immunizations.  · Follow up with your health care provider as directed.  Contact a health care provider if:  · You cough up more thick, discolored mucus (sputum) that is yellow to green in color.  · You have a fever or persistent symptoms for more than 2-3 days.  · You cannot control your cough and are losing sleep.  Get help right away if:  · You cough up blood.  · You have chest pain or increasing shortness of breath.  · You have pain that is getting worse or is uncontrolled with medicines.  · You have a fever and your symptoms suddenly get worse.  This information is not intended to replace advice given to you by your health care provider. Make sure you discuss any questions you have with your health care provider.  Document Released: 10/14/2008 Document Revised: 05/31/2017 Document Reviewed:  06/25/2014  [employer] Interactive Patient Education © 2017 [employer]    Estreñimiento - Adultos  (Constipation, Adult)  Estreñimiento significa que rell persona tiene menos de kristian evacuaciones en rell semana, dificultad para defecar, o que las heces son secas, duras, o más grandes que lo normal. A medida que envejecemos el estreñimiento es más común. Rell dieta baja en fibra, no merrill suficientes líquidos y el uso de ciertos medicamentos pueden empeorar el estreñimiento.   CAUSAS   · Ciertos medicamentos, calista los antidepresivos, analgésicos, suplementos de daniela, antiácidos y diuréticos.  · Algunas enfermedades, calista la diabetes, el síndrome del colon irritable, enfermedad de la tiroides, o depresión.  · No beber suficiente agua.  · No consumir suficientes alimentos ricos en fibra.  · Situaciones de estrés o viajes.  · Falta de actividad física o de ejercicio.  · Ignorar la necesidad súbita de defecar.  · Uso en exceso de laxantes.  SIGNOS Y SÍNTOMAS   · Defecar menos de kristian veces por semana.  · Dificultad para defecar.  · Tener las heces secas y duras, o más grandes que las normales.  · Sensación de estar lleno o hinchado.  · Dolor en la parte baja del abdomen.  · No sentir alivio después de defecar.  DIAGNÓSTICO   El médico le hará rell historia clínica y un examen físico. Pueden hacerle exámenes adicionales para el estreñimiento grave. Estos estudios pueden ser:  · Un radiografía con enema de bario para examinar el recto, el colon y, en algunos casos, el intestino perez.  · Rell sigmoidoscopia para examinar el colon inferior.  · Rell colonoscopia para examinar todo el colon.  TRATAMIENTO   El tratamiento dependerá de la gravedad del estreñimiento y de la causa. Algunos tratamientos nutricionales son beber más líquidos y comer más alimentos ricos en fibra. El cambio en el estilo de conner incluye hacer ejercicios de manera regular. Si estas recomendaciones para realizar cambios en la dieta y en el estilo de conner  no ayudan, el médico le puede indicar el uso de laxantes de venta lisa para ayudarlo a defecar. Los medicamentos recetados se pueden prescribir si los medicamentos de venta lisa no lo ayudan.   INSTRUCCIONES PARA EL CUIDADO EN EL HOGAR   · Consuma alimentos con alto contenido de fibra, calista frutas, vegetales, cereales integrales y porotos.  · Limite los alimentos procesados ricos en grasas y azúcar, calista las barbara fritas, hamburguesas, galletas, dulces y refrescos.  · Puede agregar un suplemento de fibra a gifford dieta si no obtiene lo suficiente de los alimentos.  · Marce suficiente líquido para mantener la orina indiana o de color amarillo pálido.  · Irvin ejercicio regularmente o según las indicaciones del médico.  · Vaya al baño cuando sienta la necesidad de ir. No se aguante las ganas.  · [place] solo medicamentos de venta lisa o recetados, según las indicaciones del médico. No tome otros medicamentos para el estreñimiento sin consultarlo antes con gifford médico.  SOLICITE ATENCIÓN MÉDICA DE INMEDIATO SI:   · Observa jerardo brillante en las heces.  · El estreñimiento dura más de 4 días o empeora.  · Siente dolor abdominal o rectal.  · Las heces son delgadas calista un lápiz.  · Pierde peso de manera inexplicable.  ASEGÚRESE DE QUE:   · Comprende estas instrucciones.  · Controlará gifford afección.  · Recibirá ayuda de inmediato si no mejora o si empeora.  Esta información no tiene calista fin reemplazar el consejo del médico. Asegúrese de hacerle al médico cualquier pregunta que tenga.  Document Released: 01/06/2009 Document Revised: 01/08/2016  [employer] Interactive Patient Education © 2017 [employer]      Constipation, Adult  Constipation is when a person has fewer bowel movements in a week than normal, has difficulty having a bowel movement, or has stools that are dry, hard, or larger than normal. Constipation may be caused by an underlying condition. It may become worse with age if a person takes certain medicines and does not  take in enough fluids.  Follow these instructions at home:  Eating and drinking  · Eat foods that have a lot of fiber, such as fresh fruits and vegetables, whole grains, and beans.  · Limit foods that are high in fat, low in fiber, or overly processed, such as french fries, hamburgers, cookies, candies, and soda.  · Drink enough fluid to keep your urine clear or pale yellow.  General instructions  · Exercise regularly or as told by your health care provider.  · Go to the restroom when you have the urge to go. Do not hold it in.  · Take over-the-counter and prescription medicines only as told by your health care provider. These include any fiber supplements.  · Practice pelvic floor retraining exercises, such as deep breathing while relaxing the lower abdomen and pelvic floor relaxation during bowel movements.  · Watch your condition for any changes.  · Keep all follow-up visits as told by your health care provider. This is important.  Contact a health care provider if:  · You have pain that gets worse.  · You have a fever.  · You do not have a bowel movement after 4 days.  · You vomit.  · You are not hungry.  · You lose weight.  · You are bleeding from the anus.  · You have thin, pencil-like stools.  Get help right away if:  · You have a fever and your symptoms suddenly get worse.  · You leak stool or have blood in your stool.  · Your abdomen is bloated.  · You have severe pain in your abdomen.  · You feel dizzy or you faint.  This information is not intended to replace advice given to you by your health care provider. Make sure you discuss any questions you have with your health care provider.  Document Released: 09/15/2005 Document Revised: 07/07/2017 Document Reviewed: 06/07/2017  [employer] Interactive Patient Education © 2017 [employer]        Discharge Instructions    Discharged to home by car with relative. Discharged via wheelchair, hospital escort: Yes.  Special equipment needed: Not Applicable    Be sure  to schedule a follow-up appointment with your primary care doctor or any specialists as instructed.     Discharge Plan:   Diet Plan: Discussed  Activity Level: Discussed  Confirmed Follow up Appointment: Appointment Scheduled  Confirmed Symptoms Management: Discussed  Medication Reconciliation Updated: Yes  Pneumococcal Vaccine Administered/Refused: Not given - Patient refused pneumococcal vaccine  Influenza Vaccine Indication: Patient Refuses    I understand that a diet low in cholesterol, fat, and sodium is recommended for good health. Unless I have been given specific instructions below for another diet, I accept this instruction as my diet prescription.   Other diet: regular    Special Instructions: None    · Is patient discharged on Warfarin / Coumadin?   No     Depression / Suicide Risk    As you are discharged from this [facility] facility, it is important to learn how to keep safe from harming yourself.    Recognize the warning signs:  · Abrupt changes in personality, positive or negative- including increase in energy   · Giving away possessions  · Change in eating patterns- significant weight changes-  positive or negative  · Change in sleeping patterns- unable to sleep or sleeping all the time   · Unwillingness or inability to communicate  · Depression  · Unusual sadness, discouragement and loneliness  · Talk of wanting to die  · Neglect of personal appearance   · Rebelliousness- reckless behavior  · Withdrawal from people/activities they love  · Confusion- inability to concentrate     If you or a loved one observes any of these behaviors or has concerns about self-harm, here's what you can do:  · Talk about it- your feelings and reasons for harming yourself  · Remove any means that you might use to hurt yourself (examples: pills, rope, extension cords, firearm)  · Get professional help from the community (Mental Health, Substance Abuse, psychological counseling)  · Do not be alone:Call your Safe  Contact- someone whom you trust who will be there for you.  · Call your local CRISIS HOTLINE 556-4730 or 571-711-9321  · Call your local Children's Mobile Crisis Response Team [place] (210) 691-7324 or www.[facility]  · Call the toll free National Suicide Prevention Hotlines   · National Suicide Prevention Lifeline 910-411-TKWR (4812)  · National Network [employer] Res[employer] Line Network 800-SUICIDE (985-4529)

## 2019-06-11 NOTE — DISCHARGE SUMMARY
Discharge Summary    CHIEF COMPLAINT ON ADMISSION  Chief Complaint   Patient presents with   • Shortness of Breath     SOB x 2 weeks  wheezing while sleeping  Fell last week and has R shoulder pain  Overnight care in [place]       Reason for Admission  SOB; Arm pain     Admission Date  6/10/2019    CODE STATUS  Prior    HPI & HOSPITAL COURSE  This is a 84 y.o. female here with dyspnea and a cough that has been persistent for several weeks. She was admitted for a possible cardiac etiology. However a CT showed right middle lobe bronchiectasis. She was started on a course of levaquin and monitored here. The remainder of her workup was negative. She will complete a course of oral levaquin and follow up with her PCP.        Therefore, she is discharged in good and stable condition to home with close outpatient follow-up.        Discharge Date  6/11/2019    FOLLOW UP ITEMS POST DISCHARGE  none    DISCHARGE DIAGNOSES  Principal Problem:    Exertional dyspnea POA: Unknown  Active Problems:    Peripheral neuropathy POA: Yes    Hyperlipidemia LDL goal <100 POA: Yes    Dementia POA: Unknown    Bronchiectasis (HCC) POA: Unknown  Resolved Problems:    * No resolved hospital problems. *      FOLLOW UP  Future Appointments  Date Time Provider Department Center   6/19/2019 9:40 AM Tita Taylor M.D. [facility] [place]  [address]  [address]  [facility] [id number] [phone]            MEDICATIONS ON DISCHARGE     Medication List      START taking these medications      Instructions   levoFLOXacin 500 MG tablet  Commonly known as:  LEVAQUIN   Take 1 Tab by mouth every 24 hours for 7 days.  Dose:  500 mg     polyethylene glycol/lytes Pack  Commonly known as:  MIRALAX   Take 1 Packet by mouth every day.  Dose:  17 g        CONTINUE taking these medications      Instructions   alendronate 70 MG Tabs  Commonly known as:  FOSAMAX   Doctor's comments:  Please consider 90 day supplies to promote better  adherence  Take 1 Tab by mouth every 7 days.  Dose:  70 mg     atorvastatin 40 MG Tabs  Commonly known as:  LIPITOR   Take 1 Tab by mouth every day.  Dose:  40 mg     donepezil 5 MG Tabs  Commonly known as:  ARICEPT   Take 5 mg by mouth every evening.  Dose:  5 mg     escitalopram 20 MG tablet  Commonly known as:  LEXAPRO   TAKE ONE TABLET BY MOUTH ONCE DAILY     HYDROcodone/acetaminophen  MG Tabs  Commonly known as:  NORCO   Take 1 Tab by mouth every 8 hours as needed for up to 30 days.  Dose:  1 Tab     levothyroxine 25 MCG Tabs  Commonly known as:  SYNTHROID   TAKE 1 TABLET BY MOUTH ONCE DAILY IN THE MORNING ON AN EMPTY STOMACH     LINZESS 145 MCG Caps  Generic drug:  Linaclotide   TAKE ONE CAPSULE BY MOUTH ONCE DAILY     memantine 10 MG Tabs  Commonly known as:  NAMENDA   Take 10 mg by mouth every day.  Dose:  10 mg     montelukast 10 MG Tabs  Commonly known as:  SINGULAIR   Take 1 Tab by mouth every day.  Dose:  10 mg     pregabalin 100 MG Caps  Commonly known as:  LYRICA   Take 100 mg by mouth 2 times a day.  Dose:  100 mg            Allergies  Allergies   Allergen Reactions   • Etodolac Anaphylaxis     dizziness   • Lactose Diarrhea     intolerant       DIET  No orders of the defined types were placed in this encounter.      ACTIVITY  As tolerated.  Weight bearing as tolerated    CONSULTATIONS  none    PROCEDURES  none    LABORATORY  Lab Results   Component Value Date    SODIUM 143 06/11/2019    POTASSIUM 4.3 06/11/2019    CHLORIDE 108 06/11/2019    CO2 27 06/11/2019    GLUCOSE 95 06/11/2019    BUN 15 06/11/2019    CREATININE 0.72 06/11/2019    CREATININE 0.71 02/12/2013    GLOMRATE >59 03/15/2010        Lab Results   Component Value Date    WBC 5.5 06/11/2019    WBC 6.4 02/12/2013    HEMOGLOBIN 14.0 06/11/2019    HEMATOCRIT 43.8 06/11/2019    PLATELETCT 172 06/11/2019        Total time of the discharge process exceeds 35 minutes.

## 2019-06-11 NOTE — PROGRESS NOTES
Patient requested stool softener this evening, see MAR. Informed patient x-ray was ordered for continued hip pain, patient agreed. Bed alarm is on, safety precautions in place.

## 2019-06-11 NOTE — PROGRESS NOTES
Telemetry Shift Summary    Rhythm SR  HR Range 65-90  Ectopy rare PVCs, rare PACs  Measurements .16/.10/.40        Normal Values  Rhythm SR  HR Range    Measurements 0.12-0.20 / 0.06-0.10  / 0.30-0.52    Report given to Sakina BUTTS. Plan of care discussed. Patient resting comfortably in bed. Safety precautions in place.

## 2019-06-11 NOTE — PROGRESS NOTES
Spoke to Yas from radiology, patient would like to wait until later to get x-ray. This RN will all radiology before taking the patient down.

## 2019-06-11 NOTE — PROGRESS NOTES
Medicated patient per MAR, patient assisted to the bathroom and was staining stating she could not have a BM. Senna and milk of mag given per pt request. Bed alarm is on, call light within reach.     Report given to Sakina BUTTS. Plan of care discussed. Patient resting comfortably in bed. Safety precautions in place.

## 2019-06-11 NOTE — DIETARY
"Nutrition services: Day 0 of admit.  Marii Rausch is a 84 y.o. female with admitting DX of exertional dyspnea. Hx includes dementia (poor historian), mood disorder, hyperlipidemia, pancreatitis, diverticulitis, fatty liver, metabolic syndrome, osteoporosis, peripheral neuropathy, vitamin D deficiency, and cholecystectomy.    Consult received for poor PO PTA      Assessment:  Height: 157.5 cm (5' 2\")  Weight: 68.6 kg (151 lb 3.8 oz)  Body mass index is 27.66 kg/m²., BMI classification: overweight based on BMI scale. Based on pt's age, she may be at a healthy weight.  Diet/Intake: regular diet/% (dinner)    Evaluation:   1. No weight loss noted per daughter. Daughter said that she is aware that her mother should lose weight. Higher weight is recommended in the elderly. RD encouraged weight maintenance.  2. Diminished intake noted x > 2 weeks. Intake was < 50% of her normal intake. Dtr said that pt had been in [place] 2 weeks ago and pt tends to have a poor appetite every time she returns from [place]. It eventually improves back to baseline. Intake at dinner was good last night. Upon RD visit, pt was drinking fluids and accepted a cathy cracker from her dtr. Her appetite appeared to be good. No changes made to current diet plan.   3. Meds and labs reviewed.     Malnutrition Risk: Criteria not met for the presence of malnutrition.     Recommendations/Plan:  1. Provide diet as ordered.   2. Encourage intake of >50%.  3. Document intake of all meals as % taken in ADL's to provide interdisciplinary communication across all shifts.   4. Monitor weight.  5. Nutrition rep will continue to see patient for ongoing meal and snack preferences.  6. RD will monitor.             "

## 2019-06-11 NOTE — PROGRESS NOTES
Discharge order written. IV removed, patient tolerated well. Tele removed and returned to monitor tech. Belongings gathered. Pt/daughter states that all personal belongings are in possession. AVS printed, education added in Omani and english, reviewed with daughter who speaks english, and copy signed and placed on the chart. POLST given to patient/daughter, translation copy given as well. Daughter said she wants to go over it with her family first before she signs it. Encouraged patient to bring it to primary care visit and talk to them about it there. Patient/Daughter has no further questions. Prescriptions sent to [facility]. Discharged in satisfactory condition home with daughter. Pt off unit via wheelchair, escorted by KEYONA Vaca

## 2019-06-11 NOTE — CARE PLAN
Problem: Safety  Goal: Will remain free from injury  Outcome: PROGRESSING AS EXPECTED  Assessed patient while ambulating, patient used a FWW when ambulating but has a wide based and shuffle stance. Patient safely ambulated to the bathroom and back to bed with the help of this RN and CNAs. Encouraged patient to call before ambulating, patient does not use call light appropriately, hourly rounding in place.     Problem: Knowledge Deficit  Goal: Knowledge of disease process/condition, treatment plan, diagnostic tests, and medications will improve  Outcome: PROGRESSING AS EXPECTED  Discussed plan of care with patient including medications administered, stress test ordered as well as pain medications and x-ray for continued left hip pain. Patient is forgetful but agreed with plan of care. Will continue to monitor.

## 2019-06-11 NOTE — CARE PLAN
Problem: Nutritional:  Goal: Achieve adequate nutritional intake  Patient will consume >50% of meals.  Outcome: PROGRESSING AS EXPECTED

## 2019-06-12 NOTE — PROGRESS NOTES
Telemetry Shift Summary    Rhythm SR  HR Range 60-80s  Ectopy None  Measurements .16/.08/.42          Normal Values  Rhythm SR  HR Range    Measurements 0.12-0.20 / 0.06-0.10  / 0.30-0.52

## 2019-06-19 ENCOUNTER — OFFICE VISIT (OUTPATIENT)
Dept: MEDICAL GROUP | Facility: MEDICAL CENTER | Age: 84
End: 2019-06-19
Payer: MEDICARE

## 2019-06-19 VITALS
DIASTOLIC BLOOD PRESSURE: 60 MMHG | OXYGEN SATURATION: 94 % | BODY MASS INDEX: 29.64 KG/M2 | HEART RATE: 76 BPM | RESPIRATION RATE: 16 BRPM | HEIGHT: 60 IN | SYSTOLIC BLOOD PRESSURE: 118 MMHG | TEMPERATURE: 97.8 F | WEIGHT: 151 LBS

## 2019-06-19 DIAGNOSIS — F03.90 DEMENTIA WITHOUT BEHAVIORAL DISTURBANCE, UNSPECIFIED DEMENTIA TYPE: ICD-10-CM

## 2019-06-19 DIAGNOSIS — R53.83 FATIGUE, UNSPECIFIED TYPE: ICD-10-CM

## 2019-06-19 DIAGNOSIS — R06.00 DYSPNEA, UNSPECIFIED TYPE: ICD-10-CM

## 2019-06-19 DIAGNOSIS — G89.29 CHRONIC RIGHT SHOULDER PAIN: ICD-10-CM

## 2019-06-19 DIAGNOSIS — E03.8 OTHER SPECIFIED HYPOTHYROIDISM: ICD-10-CM

## 2019-06-19 DIAGNOSIS — Z09 HOSPITAL DISCHARGE FOLLOW-UP: ICD-10-CM

## 2019-06-19 DIAGNOSIS — M25.511 CHRONIC RIGHT SHOULDER PAIN: ICD-10-CM

## 2019-06-19 DIAGNOSIS — R06.01 ORTHOPNEA: ICD-10-CM

## 2019-06-19 DIAGNOSIS — J47.0 BRONCHIECTASIS WITH ACUTE LOWER RESPIRATORY INFECTION (HCC): ICD-10-CM

## 2019-06-19 PROCEDURE — 99215 OFFICE O/P EST HI 40 MIN: CPT | Performed by: FAMILY MEDICINE

## 2019-06-19 NOTE — PATIENT INSTRUCTIONS
If you need further assistance, or have any questions; concerns or lingering symptoms before seeing your Primary Care Provider or specialist.     Do not hesitate to contact us.     Please contact us at the Post-Hospital Follow Up Program at [phone] and ask for the Medical Assistant for Dr Taylor.   Our offices hours are Monday-Friday 8 am-5 pm.

## 2019-06-19 NOTE — PROGRESS NOTES
Subjective:     Marii Rausch is a 84 y.o. female who presents for Hospital Follow-up.  Chart and discharge summary reviewed.   Date of discharge 6/11/2019.  48- hour post discharge RN call not completed.       HPI: The patient is a very nice 84-year old  female with a history of dementia, hypothyroidism, osteoarthritis, osteoporosis.  She was recently hospitalized with a complaint of dyspnea and cough.  CT scan showed right middle lobe bronchiectasis as well as scarring or interstitial lung disease bilateral lower lobes.  She was treated with a course of Levaquin.    Since returning home, patient reports feeling the same.  She still has shortness of breath, worse with exertion.  She says when she lays flat she cannot breathe.  This has been ongoing for the past couple of months.  She has some swelling of her legs but says this is not new.  Her granddaughter notes that she has seen a couple of doctors for persistent pain which she describes as in her right upper arm and underneath her right shoulder.  Right shoulder was x-rayed on 6/10/2019 and showed degenerative change of the glenohumeral and acromioclavicular joints as well as chondrocalcinosis.      The patient has questions regarding hospitalization or discharge: She mainly wants to know why she is having trouble breathing.  The patient has weakness; her family cares for her at home.  The family manages her medications.    Current Outpatient Prescriptions   Medication Sig Dispense Refill   • polyethylene glycol/lytes (MIRALAX) Pack Take 1 Packet by mouth every day. 30 Each 2   • pregabalin (LYRICA) 100 MG Cap Take 100 mg by mouth 2 times a day.     • memantine (NAMENDA) 10 MG Tab Take 10 mg by mouth every day.     • donepezil (ARICEPT) 5 MG Tab Take 5 mg by mouth every evening.     • levothyroxine (SYNTHROID) 25 MCG Tab TAKE 1 TABLET BY MOUTH ONCE DAILY IN THE MORNING ON AN EMPTY STOMACH 90 Tab 3   • escitalopram (LEXAPRO) 20 MG tablet TAKE  ONE TABLET BY MOUTH ONCE DAILY 90 Tab 3   • HYDROcodone/acetaminophen (NORCO)  MG Tab Take 1 Tab by mouth every 8 hours as needed for up to 30 days. 60 Tab 0   • LINZESS 145 MCG Cap TAKE ONE CAPSULE BY MOUTH ONCE DAILY 90 Cap 3   • alendronate (FOSAMAX) 70 MG Tab Take 1 Tab by mouth every 7 days. 12 Tab 3   • atorvastatin (LIPITOR) 40 MG Tab Take 1 Tab by mouth every day. 90 Tab 3   • montelukast (SINGULAIR) 10 MG Tab Take 1 Tab by mouth every day. 30 Tab 30     No current facility-administered medications for this visit.        Allergies:   Etodolac and Lactose    Social History:  Social History   Substance Use Topics   • Smoking status: Never Smoker   • Smokeless tobacco: Never Used   • Alcohol use No        Family History:  Family History   Problem Relation Age of Onset   • Family history unknown: Yes       Past Medical History:   Diagnosis Date   • Allergic rhinitis    • Arthritis     hand pain and numbness   • ASCUS on Pap smear 5/8/09   • DDD (degenerative disc disease)    • Diverticulitis    • Fatty liver    • H. pylori infection 3/17/08    treated with prevpac   • Hyperlipidemia LDL goal < 100    • Lumbar stenosis     MRI lumbar spine 2011 showed multilevel OA and multiple bulging with stenosis   • Metabolic syndrome    • Osteoporosis    • Pancreatitis 2002   • Peripheral neuropathy    • Shingles    • Vitamin d deficiency        Surgical History  Past Surgical History:   Procedure Laterality Date   • CHOLECYSTECTOMY     • LUMBAR DISC REPLACEMENT         ROS:    Constitutional:  Denies fever, chills, night sweats.  She has generalized fatigue.  HENT: Denies head congestion, ear pain or drainage, decreased hearing, sore throat  Eyes: Denies visual changes, eye drainage or redness, eye pain  Neck: Denies pain, swollen glands, decreased range of motion  Lungs: Denies wheezing, cough.  She has shortness of breath, worse with exertion.  Cardiovascular: Denies chest pain, palpitations.  She has orthopnea and  chronic lower extremity edema.  Abdominal: Denies abdominal pain, change in bowel or bladder habits, nausea or vomiting  Musculoskeletal: She has right shoulder pain with decreased range of motion.  She has chronic pain in her back from arthritis.  Endocrine: Denies unexplained weight changes, heat or cold intolerance, hair loss, polyuria or polydipsia  Neurological: She has dementia.  Psychiatric: She takes Lexapro for depression.       Objective:     /60 (BP Location: Left arm, Patient Position: Sitting, BP Cuff Size: Adult)   Pulse 76   Temp 36.6 °C (97.8 °F) (Temporal)   Resp 16   Ht 1.524 m (5')   Wt 68.5 kg (151 lb)   SpO2 94%      Physical Exam:    GEN:  Alert, oriented, in no distress  HEENT:  PERRLA, EOMI  NECK;  Supple without cervical adenopathy   LUNGS:  Clear to auscultation in the upper lung fields.  There are bibasilar crackles, worse on the right than on the left  CV:  Heart RRR without murmurs or S3 or S4  EXT:  Without cyanosis, clubbing, trace bilateral pedal edema.  She has tenderness to palpation over the right shoulder joint and decreased range of motion.  NEURO:  Cranial nerves II through XII intact.  Motor function and sensation intact.  SKIN: No rashes or suspicious lesions.  PSYCH:  Behavior is appropriate.      Assessment and Plan:     1. Hospital follow-up:   Hospitalization and results reviewed with patient. High risk conditions requiring teaching or care coordination were identified and addressed.The patient demonstrate understanding of admission and underlying conditions. The patient understands discharge instructions and when to seek medical attention. Medications reviewed including instructions regarding high risk medications, dosing and side effects.    The patient is able to safely adhere to ADL/IADL, treatment and medication regimen, self-manage of high-risk conditions? No   The patient requires physical therapy/home health/DME referral? No   The patient requires  referral to care coordination/behavioral health/social work?  No   Patient requires referral for pharmacy consult? No     2. Bronchiectasis with acute lower respiratory infection (HCC)  -She has completed her course of Levaquin and has no cough, fever, congestion.  Her ongoing dyspnea does not appear to be infection related.  CT scan showed possible interstitial lung disease.  Cardiac etiology is possible.    3. Orthopnea  - EC-ECHOCARDIOGRAM COMPLETE W/O CONT; Future  If echocardiogram does not show any indication of heart failure, consider pulmonary referral for evaluation for interstitial lung disease.    4. Dyspnea, unspecified type  - EC-ECHOCARDIOGRAM COMPLETE W/O CONT; Future    5. Fatigue, unspecified type  - EC-ECHOCARDIOGRAM COMPLETE W/O CONT; Future    6. Chronic right shoulder pain  -Most likely secondary to osteoarthritis.  She will discuss possible referral with her PCP.    7. Dementia without behavioral disturbance, unspecified dementia type  -She is continuing Aricept and Namenda.    8. Other specified hypothyroidism  -TSH is in the therapeutic range at 0.890.  She will continue levothyroxine 25 mcg daily.        Medication Reconciliation  Medication list at end of encounter:   Current Outpatient Prescriptions   Medication Sig Dispense Refill   • polyethylene glycol/lytes (MIRALAX) Pack Take 1 Packet by mouth every day. 30 Each 2   • pregabalin (LYRICA) 100 MG Cap Take 100 mg by mouth 2 times a day.     • memantine (NAMENDA) 10 MG Tab Take 10 mg by mouth every day.     • donepezil (ARICEPT) 5 MG Tab Take 5 mg by mouth every evening.     • levothyroxine (SYNTHROID) 25 MCG Tab TAKE 1 TABLET BY MOUTH ONCE DAILY IN THE MORNING ON AN EMPTY STOMACH 90 Tab 3   • escitalopram (LEXAPRO) 20 MG tablet TAKE ONE TABLET BY MOUTH ONCE DAILY 90 Tab 3   • HYDROcodone/acetaminophen (NORCO)  MG Tab Take 1 Tab by mouth every 8 hours as needed for up to 30 days. 60 Tab 0   • LINZESS 145 MCG Cap TAKE ONE CAPSULE BY  MOUTH ONCE DAILY 90 Cap 3   • alendronate (FOSAMAX) 70 MG Tab Take 1 Tab by mouth every 7 days. 12 Tab 3   • atorvastatin (LIPITOR) 40 MG Tab Take 1 Tab by mouth every day. 90 Tab 3   • montelukast (SINGULAIR) 10 MG Tab Take 1 Tab by mouth every day. 30 Tab 30     No current facility-administered medications for this visit.        Primary care follow-up:  New health conditions identified during hospitalization? Yes, chronic dyspnea, chronic right shoulder pain.  Labs/pathology/imaging requires future PCP follow-up?  Yes, echocardiogram  Changes to medications during hospitalization or today? No     Recommended followup:  with MUKUL Gardner   Future Appointments       Provider Department [place]    7/23/2019 11:20 AM MUKUL Gardner [facility]          Patient Instruction  Patient provided with educational material on discharge diagnosis and management of symptoms/red flags. Patient instructed to keep follow-up appointments and to bring written questions and and actual medications to each office visit. Patient instructed to call PCP/specialist with any problems/questions/concerns. Patient verbalizes understanding and has no further questions at this time.    Total of 40 minutes face-to-face time was spent with patient, with greater than 50% of the time spent in counseling regarding the possible causes of her shortness of breath, shoulder and upper arm pain, and coordination of care in getting her a follow-up appointment with her PCP, ordering echocardiogram.

## 2019-06-23 DIAGNOSIS — R41.3 SHORT-TERM MEMORY LOSS: ICD-10-CM

## 2019-06-23 DIAGNOSIS — F03.90 DEMENTIA WITHOUT BEHAVIORAL DISTURBANCE, UNSPECIFIED DEMENTIA TYPE: ICD-10-CM

## 2019-06-24 RX ORDER — DONEPEZIL HYDROCHLORIDE 5 MG/1
TABLET, FILM COATED ORAL
Qty: 90 TAB | Refills: 3 | Status: SHIPPED | OUTPATIENT
Start: 2019-06-24

## 2019-06-24 RX ORDER — MEMANTINE HYDROCHLORIDE 10 MG/1
TABLET ORAL
Qty: 180 TAB | Refills: 3 | Status: SHIPPED | OUTPATIENT
Start: 2019-06-24

## 2019-07-05 ENCOUNTER — APPOINTMENT (OUTPATIENT)
Dept: CARDIOLOGY | Facility: MEDICAL CENTER | Age: 84
End: 2019-07-05
Attending: FAMILY MEDICINE
Payer: MEDICARE

## 2019-07-19 ENCOUNTER — APPOINTMENT (OUTPATIENT)
Dept: CARDIOLOGY | Facility: MEDICAL CENTER | Age: 84
End: 2019-07-19
Attending: FAMILY MEDICINE
Payer: MEDICARE

## 2019-09-25 DIAGNOSIS — G60.9 IDIOPATHIC PERIPHERAL NEUROPATHY: ICD-10-CM

## 2019-09-25 NOTE — TELEPHONE ENCOUNTER
Was the patient seen in the last year in this department? Yes    Does patient have an active prescription for medications requested?     Received Request Via: Pharmacy

## 2019-09-28 RX ORDER — PREGABALIN 100 MG/1
100 CAPSULE ORAL 2 TIMES DAILY
Qty: 180 CAP | Refills: 3 | Status: SHIPPED
Start: 2019-09-28 | End: 2019-12-27

## 2020-01-15 ENCOUNTER — OFFICE VISIT (OUTPATIENT)
Dept: MEDICAL GROUP | Facility: MEDICAL CENTER | Age: 85
End: 2020-01-15
Payer: MEDICARE

## 2020-01-15 VITALS
HEART RATE: 93 BPM | HEIGHT: 60 IN | BODY MASS INDEX: 30.43 KG/M2 | DIASTOLIC BLOOD PRESSURE: 70 MMHG | TEMPERATURE: 97.7 F | SYSTOLIC BLOOD PRESSURE: 128 MMHG | OXYGEN SATURATION: 93 % | WEIGHT: 155 LBS

## 2020-01-15 DIAGNOSIS — Z79.899 POLYPHARMACY: ICD-10-CM

## 2020-01-15 DIAGNOSIS — R22.31 ARM MASS, RIGHT: ICD-10-CM

## 2020-01-15 DIAGNOSIS — E66.9 OBESITY (BMI 30-39.9): ICD-10-CM

## 2020-01-15 DIAGNOSIS — F01.518 VASCULAR DEMENTIA WITH BEHAVIOR DISTURBANCE (HCC): ICD-10-CM

## 2020-01-15 DIAGNOSIS — M79.601 RIGHT ARM PAIN: ICD-10-CM

## 2020-01-15 PROCEDURE — 99214 OFFICE O/P EST MOD 30 MIN: CPT | Performed by: NURSE PRACTITIONER

## 2020-01-15 NOTE — PROGRESS NOTES
Subjective:     Marii Rausch is a 84 y.o. female who presents with rt arm pain.    HPI:   seenin f/u for rt arm pain.  She is just back from 6 mo in [place].  Before she left she was having rt upper arm pain.  Went to ER.  They did xray that showed only OA.  Then in [place] the arm pain worsened.  She went to provider there.  They found mass on rt upper arm/prob cyst.  Required draining twice.  First time 12/198/19.  2nd time 1/3/20.  Now lump is palpable again.  It is causing pain again.  Pain with ROM rt arm.   She is seen with daughter today.   They have seen geriatrics before leaving.  Geriatrics recommended dc'd meds if poss.    Daughter is caring for both parents.  Pt very confused.  Would like ot see if HHC can help.  She is on namenda and aricept.  She will check with geriatrics to see if they recommend that they want that dc'd also.      Patient Active Problem List    Diagnosis Date Noted   • Exertional dyspnea 06/10/2019     Priority: High   • Lumbar stenosis      Priority: Low   • DDD (degenerative disc disease)      Priority: Low   • Osteoporosis, senile 12/03/2009     Priority: Low   • Obesity (BMI 30-39.9) 01/15/2020   • Bronchiectasis (HCC) 06/11/2019   • Dementia (HCC) 06/10/2019   • Chronic left hip pain 12/04/2018   • Constipation 12/04/2018   • Hyperlipidemia LDL goal <100 05/09/2017   • Arthritis    • Peripheral neuropathy 09/03/2015   • Vitamin d deficiency    • History of pancreatitis    • Allergic rhinitis 04/14/2011   • Metabolic syndrome    • Fatty liver    • Diverticulitis    • Preventative health care 04/15/2010   • Atypical squamous cells of undetermined significance (ASCUS) on Papanicolaou smear of cervix 05/08/2009   • H. pylori infection 03/17/2008       Current medicines (including changes today)  Current Outpatient Medications   Medication Sig Dispense Refill   • donepezil (ARICEPT) 5 MG Tab TAKE 1 TABLET BY MOUTH ONCE DAILY IN THE EVENING 90 Tab 3   • memantine (NAMENDA)  10 MG Tab TAKE 1 TABLET BY MOUTH TWICE DAILY 180 Tab 3   • polyethylene glycol/lytes (MIRALAX) Pack Take 1 Packet by mouth every day. 30 Each 2   • memantine (NAMENDA) 10 MG Tab Take 10 mg by mouth every day.     • donepezil (ARICEPT) 5 MG Tab Take 5 mg by mouth every evening.     • levothyroxine (SYNTHROID) 25 MCG Tab TAKE 1 TABLET BY MOUTH ONCE DAILY IN THE MORNING ON AN EMPTY STOMACH 90 Tab 3   • escitalopram (LEXAPRO) 20 MG tablet TAKE ONE TABLET BY MOUTH ONCE DAILY 90 Tab 3   • LINZESS 145 MCG Cap TAKE ONE CAPSULE BY MOUTH ONCE DAILY 90 Cap 3   • alendronate (FOSAMAX) 70 MG Tab Take 1 Tab by mouth every 7 days. 12 Tab 3   • atorvastatin (LIPITOR) 40 MG Tab Take 1 Tab by mouth every day. 90 Tab 3   • montelukast (SINGULAIR) 10 MG Tab Take 1 Tab by mouth every day. 30 Tab 30     No current facility-administered medications for this visit.        Allergies   Allergen Reactions   • Etodolac Anaphylaxis     dizziness   • Lactose Diarrhea     intolerant       ROS  Constitutional: Negative. Negative for fever, chills, weight loss, malaise/fatigue and diaphoresis.   HENT: Negative. Negative for hearing loss, ear pain, nosebleeds, congestion, sore throat, neck pain, tinnitus and ear discharge.   Respiratory: Negative. Negative for cough, hemoptysis, sputum production, shortness of breath, wheezing and stridor.   Cardiovascular: Negative. Negative for chest pain, palpitations, orthopnea, claudication, leg swelling and PND.   Gastrointestinal: Denies nausea, vomiting, diarrhea, constipation, heartburn, melena or hematochezia.  Genitourinary: Denies dysuria, hematuria, urinary incontinence, frequency or urgency.        Objective:     /70 (BP Location: Right arm, Patient Position: Sitting)   Pulse 93   Temp 36.5 °C (97.7 °F) (Temporal)   Ht 1.524 m (5')   Wt 70.3 kg (155 lb)   SpO2 93%  Body mass index is 30.27 kg/m².    Physical Exam:  Vitals reviewed.  Constitutional: Oriented to person, place, and time.  appears well-developed and well-nourished. No distress.   Cardiovascular: Normal rate, regular rhythm, normal heart sounds and intact distal pulses. Exam reveals no gallop and no friction rub. No murmur heard. No carotid bruits.   Pulmonary/Chest: Effort normal and breath sounds normal. No stridor. No respiratory distress. no wheezes or rales. exhibits no tenderness.   Musculoskeletal: Normal range of motion. exhibits no edema. bryn pedal pulses 2+.  Lymphadenopathy: No cervical or supraclavicular adenopathy.   Neurological: Alert and oriented to person, place, and time. exhibits normal muscle tone.  Skin: Skin is warm and dry. No diaphoresis.   Psychiatric: Normal mood and affect. Behavior is normal.      Assessment and Plan:     The following treatment plan was discussed:    1. Polypharmacy      geriatrics wants some meds stopped.  will wean off lexapro.  DC-FOSAMAX, singulair, lipitor.  f/u with them to see if dc namenda, aricept.   2. Right arm pain  CT-HUMERUS WITH & W/O RIGHT    Basic Metabolic Panel    REFERRAL TO ORTHOPEDICS    REFERRAL TO PLASTIC SURGERY    REFERRAL TO HOME HEALTH    ct rt arm.  cannot do MRI d/t metal in back.  refer ortho and plastics to see if they can remove mass or drain.  ct humerus.  bmp before test   3. Arm mass, right  CT-HUMERUS WITH & W/O RIGHT    Basic Metabolic Panel    REFERRAL TO ORTHOPEDICS    REFERRAL TO PLASTIC SURGERY    REFERRAL TO HOME HEALTH   4. Vascular dementia with behavior disturbance (HCC)  REFERRAL TO HOME HEALTH    on aricept and namenda.  f/u with geriatrics for further recommendations.     5. Obesity (BMI 30-39.9)  Patient identified as having weight management issue.  Appropriate orders and counseling given.         Followup: Return if symptoms worsen or fail to improve.

## 2020-01-19 ENCOUNTER — HOSPITAL ENCOUNTER (EMERGENCY)
Facility: MEDICAL CENTER | Age: 85
End: 2020-01-19
Attending: EMERGENCY MEDICINE
Payer: MEDICARE

## 2020-01-19 VITALS
DIASTOLIC BLOOD PRESSURE: 51 MMHG | HEART RATE: 75 BPM | WEIGHT: 155 LBS | RESPIRATION RATE: 16 BRPM | HEIGHT: 62 IN | TEMPERATURE: 97.5 F | SYSTOLIC BLOOD PRESSURE: 122 MMHG | BODY MASS INDEX: 28.52 KG/M2 | OXYGEN SATURATION: 98 %

## 2020-01-19 DIAGNOSIS — G89.29 CHRONIC RIGHT SHOULDER PAIN: ICD-10-CM

## 2020-01-19 DIAGNOSIS — M25.511 CHRONIC RIGHT SHOULDER PAIN: ICD-10-CM

## 2020-01-19 DIAGNOSIS — R15.2 FECAL URGENCY: ICD-10-CM

## 2020-01-19 LAB
ALBUMIN SERPL BCP-MCNC: 3.6 G/DL (ref 3.2–4.9)
ALBUMIN/GLOB SERPL: 1.6 G/DL
ALP SERPL-CCNC: 59 U/L (ref 30–99)
ALT SERPL-CCNC: 11 U/L (ref 2–50)
ANION GAP SERPL CALC-SCNC: 7 MMOL/L (ref 0–11.9)
APPEARANCE UR: ABNORMAL
AST SERPL-CCNC: 16 U/L (ref 12–45)
BACTERIA #/AREA URNS HPF: ABNORMAL /HPF
BASOPHILS # BLD AUTO: 0.9 % (ref 0–1.8)
BASOPHILS # BLD: 0.05 K/UL (ref 0–0.12)
BILIRUB SERPL-MCNC: 0.4 MG/DL (ref 0.1–1.5)
BILIRUB UR QL STRIP.AUTO: NEGATIVE
BUN SERPL-MCNC: 17 MG/DL (ref 8–22)
CALCIUM SERPL-MCNC: 9 MG/DL (ref 8.5–10.5)
CHLORIDE SERPL-SCNC: 104 MMOL/L (ref 96–112)
CO2 SERPL-SCNC: 27 MMOL/L (ref 20–33)
COLOR UR: YELLOW
CREAT SERPL-MCNC: 0.71 MG/DL (ref 0.5–1.4)
EOSINOPHIL # BLD AUTO: 0.19 K/UL (ref 0–0.51)
EOSINOPHIL NFR BLD: 3.4 % (ref 0–6.9)
EPI CELLS #/AREA URNS HPF: ABNORMAL /HPF
ERYTHROCYTE [DISTWIDTH] IN BLOOD BY AUTOMATED COUNT: 49.5 FL (ref 35.9–50)
GLOBULIN SER CALC-MCNC: 2.3 G/DL (ref 1.9–3.5)
GLUCOSE SERPL-MCNC: 113 MG/DL (ref 65–99)
GLUCOSE UR STRIP.AUTO-MCNC: NEGATIVE MG/DL
HCT VFR BLD AUTO: 42.4 % (ref 37–47)
HGB BLD-MCNC: 13.8 G/DL (ref 12–16)
HYALINE CASTS #/AREA URNS LPF: ABNORMAL /LPF
IMM GRANULOCYTES # BLD AUTO: 0.01 K/UL (ref 0–0.11)
IMM GRANULOCYTES NFR BLD AUTO: 0.2 % (ref 0–0.9)
KETONES UR STRIP.AUTO-MCNC: NEGATIVE MG/DL
LEUKOCYTE ESTERASE UR QL STRIP.AUTO: NEGATIVE
LYMPHOCYTES # BLD AUTO: 1.61 K/UL (ref 1–4.8)
LYMPHOCYTES NFR BLD: 29.1 % (ref 22–41)
MCH RBC QN AUTO: 30.7 PG (ref 27–33)
MCHC RBC AUTO-ENTMCNC: 32.5 G/DL (ref 33.6–35)
MCV RBC AUTO: 94.2 FL (ref 81.4–97.8)
MICRO URNS: ABNORMAL
MONOCYTES # BLD AUTO: 0.58 K/UL (ref 0–0.85)
MONOCYTES NFR BLD AUTO: 10.5 % (ref 0–13.4)
NEUTROPHILS # BLD AUTO: 3.1 K/UL (ref 2–7.15)
NEUTROPHILS NFR BLD: 55.9 % (ref 44–72)
NITRITE UR QL STRIP.AUTO: NEGATIVE
NRBC # BLD AUTO: 0 K/UL
NRBC BLD-RTO: 0 /100 WBC
PH UR STRIP.AUTO: 5.5 [PH] (ref 5–8)
PLATELET # BLD AUTO: 161 K/UL (ref 164–446)
PMV BLD AUTO: 12.1 FL (ref 9–12.9)
POTASSIUM SERPL-SCNC: 4 MMOL/L (ref 3.6–5.5)
PROT SERPL-MCNC: 5.9 G/DL (ref 6–8.2)
PROT UR QL STRIP: NEGATIVE MG/DL
RBC # BLD AUTO: 4.5 M/UL (ref 4.2–5.4)
RBC # URNS HPF: ABNORMAL /HPF
RBC UR QL AUTO: NEGATIVE
SODIUM SERPL-SCNC: 138 MMOL/L (ref 135–145)
SP GR UR STRIP.AUTO: 1.02
UROBILINOGEN UR STRIP.AUTO-MCNC: 1 MG/DL
WBC # BLD AUTO: 5.5 K/UL (ref 4.8–10.8)
WBC #/AREA URNS HPF: ABNORMAL /HPF

## 2020-01-19 PROCEDURE — 99284 EMERGENCY DEPT VISIT MOD MDM: CPT

## 2020-01-19 PROCEDURE — 80053 COMPREHEN METABOLIC PANEL: CPT

## 2020-01-19 PROCEDURE — 85025 COMPLETE CBC W/AUTO DIFF WBC: CPT

## 2020-01-19 PROCEDURE — 81001 URINALYSIS AUTO W/SCOPE: CPT

## 2020-01-19 RX ORDER — METHYLPREDNISOLONE 4 MG/1
TABLET ORAL
Qty: 1 PACKAGE | Refills: 0 | Status: SHIPPED | OUTPATIENT
Start: 2020-01-19

## 2020-01-19 RX ORDER — PREGABALIN 100 MG/1
100 CAPSULE ORAL 2 TIMES DAILY
COMMUNITY
End: 2020-05-18

## 2020-01-19 NOTE — ED TRIAGE NOTES
Pt comes in complaining of right shoulder pain. Pt went to her PCP last week and she is not doing any better. Family also stating pt is using the restroom frequently

## 2020-01-19 NOTE — DISCHARGE INSTRUCTIONS
Follow-up with orthopedic specialist.    Please see the primary doctor for recheck as soon as possible.    Return if worse or for any concerns

## 2020-01-19 NOTE — ED PROVIDER NOTES
ED Provider Note    CHIEF COMPLAINT  Chief Complaint   Patient presents with   • Shoulder Pain       HPI  Marii Rausch is a 84 y.o. female who presents with ongoing right shoulder pain.  Twice in [place] it has been aspirated for fluid.  She saw her primary care doctor last Wednesday, states there is a pending referral to orthopedics.  X-ray was obtained according to the family, negative for fracture is reported by the family.  Pain is similar to previous exacerbations.  They have been giving hydrocodone 1 tablet every 8 hours as needed for pain control without relief.  Second complaint is frequent urination, the patient has some memory problems and the family states trying to sit down and stand back up again for the patient is problematic given her right shoulder pain.  No associated chest pain or shortness of breath.  Patient has had this discomfort for greater than 6 months.    REVIEW OF SYSTEMS    Constitutional: No fever  Respiratory: No shortness of breath  Cardiac: No chest pain or syncope  Gastrointestinal: No abdominal pain  Musculoskeletal: Chronic right shoulder pain  Neurologic: No headache  Genitourinary: Urinary frequency       All other systems are negative.       PAST MEDICAL HISTORY  Past Medical History:   Diagnosis Date   • Allergic rhinitis    • Arthritis     hand pain and numbness   • ASCUS on Pap smear 5/8/09   • DDD (degenerative disc disease)    • Diverticulitis    • Fatty liver    • H. pylori infection 3/17/08    treated with prevpac   • Hyperlipidemia LDL goal < 100    • Lumbar stenosis     MRI lumbar spine 2011 showed multilevel OA and multiple bulging with stenosis   • Metabolic syndrome    • Osteoporosis    • Pancreatitis 2002   • Peripheral neuropathy    • Shingles    • Vitamin d deficiency        FAMILY HISTORY  Family History   Family history unknown: Yes       SOCIAL HISTORY  Social History     Socioeconomic History   • Marital status:      Spouse name: Not on file    • Number of children: Not on file   • Years of education: Not on file   • Highest education level: Not on file   Occupational History   • Not on file   Social Needs   • Financial resource strain: Not on file   • Food insecurity:     Worry: Not on file     Inability: Not on file   • Transportation needs:     Medical: Not on file     Non-medical: Not on file   Tobacco Use   • Smoking status: Never Smoker   • Smokeless tobacco: Never Used   Substance and Sexual Activity   • Alcohol use: No     Alcohol/week: 0.0 oz   • Drug use: No   • Sexual activity: Never   Lifestyle   • Physical activity:     Days per week: Not on file     Minutes per session: Not on file   • Stress: Not on file   Relationships   • Social connections:     Talks on phone: Not on file     Gets together: Not on file     Attends [employer] service: Not on file     Active member of club or organization: Not on file     Attends meetings of clubs or organizations: Not on file     Relationship status: Not on file   • Intimate partner violence:     Fear of current or ex partner: Not on file     Emotionally abused: Not on file     Physically abused: Not on file     Forced sexual activity: Not on file   Other Topics Concern   • Not on file   Social History Narrative   • Not on file       SURGICAL HISTORY  Past Surgical History:   Procedure Laterality Date   • CHOLECYSTECTOMY     • LUMBAR DISC REPLACEMENT         CURRENT MEDICATIONS  Home Medications     Reviewed by Afua Quinones R.N. (Registered Nurse) on 01/19/20 at 1010  Med List Status: Partial   Medication Last Dose Status   alendronate (FOSAMAX) 70 MG Tab  Active   donepezil (ARICEPT) 5 MG Tab  Active   escitalopram (LEXAPRO) 20 MG tablet  Active   memantine (NAMENDA) 10 MG Tab  Active   montelukast (SINGULAIR) 10 MG Tab  Active   polyethylene glycol/lytes (MIRALAX) Pack  Active   pregabalin (LYRICA) 100 MG Cap  Active                ALLERGIES  Allergies   Allergen Reactions   • Etodolac Anaphylaxis  "    dizziness   • Lactose Diarrhea     intolerant       PHYSICAL EXAM  VITAL SIGNS: BP (!) 99/50   Pulse 90   Temp 36.4 °C (97.5 °F) (Temporal)   Resp 14   Ht 1.575 m (5' 2\")   Wt 70.3 kg (155 lb)   SpO2 96%   BMI 28.35 kg/m²   Constitutional:  Non-toxic appearance.   HENT: No facial swelling  Eyes: Anicteric, no conjunctivitis.     Cardiovascular: Normal heart rate, Normal rhythm  Pulmonary:  No wheezing, No rales.   Gastrointestinal: Soft, No tenderness, No masses  Skin: Warm, Dry, No cyanosis.  No erythematous change or bruising to the right shoulder.  Right shoulder slightly swollen compared to the left side  Neurologic: Speech is clear, follows commands, facial expression is symmetrical.   strength equal.  Psychiatric: Affect flat,  Mood normal.  Patient is calm and cooperative  Musculoskeletal: Diffuse tenderness right shoulder, no crepitance or deformity.  Cervical neck spine and thoracic spine nontender.    EKG/Labs  Results for orders placed or performed during the hospital encounter of 01/19/20   CBC WITH DIFFERENTIAL   Result Value Ref Range    WBC 5.5 4.8 - 10.8 K/uL    RBC 4.50 4.20 - 5.40 M/uL    Hemoglobin 13.8 12.0 - 16.0 g/dL    Hematocrit 42.4 37.0 - 47.0 %    MCV 94.2 81.4 - 97.8 fL    MCH 30.7 27.0 - 33.0 pg    MCHC 32.5 (L) 33.6 - 35.0 g/dL    RDW 49.5 35.9 - 50.0 fL    Platelet Count 161 (L) 164 - 446 K/uL    MPV 12.1 9.0 - 12.9 fL    Neutrophils-Polys 55.90 44.00 - 72.00 %    Lymphocytes 29.10 22.00 - 41.00 %    Monocytes 10.50 0.00 - 13.40 %    Eosinophils 3.40 0.00 - 6.90 %    Basophils 0.90 0.00 - 1.80 %    Immature Granulocytes 0.20 0.00 - 0.90 %    Nucleated RBC 0.00 /100 WBC    Neutrophils (Absolute) 3.10 2.00 - 7.15 K/uL    Lymphs (Absolute) 1.61 1.00 - 4.80 K/uL    Monos (Absolute) 0.58 0.00 - 0.85 K/uL    Eos (Absolute) 0.19 0.00 - 0.51 K/uL    Baso (Absolute) 0.05 0.00 - 0.12 K/uL    Immature Granulocytes (abs) 0.01 0.00 - 0.11 K/uL    NRBC (Absolute) 0.00 K/uL   COMP " METABOLIC PANEL   Result Value Ref Range    Sodium 138 135 - 145 mmol/L    Potassium 4.0 3.6 - 5.5 mmol/L    Chloride 104 96 - 112 mmol/L    Co2 27 20 - 33 mmol/L    Anion Gap 7.0 0.0 - 11.9    Glucose 113 (H) 65 - 99 mg/dL    Bun 17 8 - 22 mg/dL    Creatinine 0.71 0.50 - 1.40 mg/dL    Calcium 9.0 8.5 - 10.5 mg/dL    AST(SGOT) 16 12 - 45 U/L    ALT(SGPT) 11 2 - 50 U/L    Alkaline Phosphatase 59 30 - 99 U/L    Total Bilirubin 0.4 0.1 - 1.5 mg/dL    Albumin 3.6 3.2 - 4.9 g/dL    Total Protein 5.9 (L) 6.0 - 8.2 g/dL    Globulin 2.3 1.9 - 3.5 g/dL    A-G Ratio 1.6 g/dL   URINALYSIS CULTURE, IF INDICATED   Result Value Ref Range    Color Yellow     Character Cloudy (A)     Specific Gravity 1.024 <1.035    Ph 5.5 5.0 - 8.0    Glucose Negative Negative mg/dL    Ketones Negative Negative mg/dL    Protein Negative Negative mg/dL    Bilirubin Negative Negative    Urobilinogen, Urine 1.0 Negative    Nitrite Negative Negative    Leukocyte Esterase Negative Negative    Occult Blood Negative Negative    Micro Urine Req Microscopic    ESTIMATED GFR   Result Value Ref Range    GFR If African American >60 >60 mL/min/1.73 m 2    GFR If Non African American >60 >60 mL/min/1.73 m 2   URINE MICROSCOPIC (W/UA)   Result Value Ref Range    WBC 2-5 /hpf    RBC 2-5 (A) /hpf    Bacteria Few (A) None /hpf    Epithelial Cells Many (A) /hpf    Hyaline Cast 0-2 /lpf           COURSE & MEDICAL DECISION MAKING  Pertinent Labs & Imaging studies reviewed. (See chart for details)  Patient with right shoulder pain of unknown etiology.  She has history of arthritis as well as recent negative x-ray.  There is no erythematous change, no fever, white blood cell count is normal.  Septic arthritis unlikely given the chronic nature of the problem.  They have been referred to on-call orthopedics.  Regarding the patient's fecal urgency, etiology is unknown.  The patient's daughter reports the patient's last bowel movement was normal, and not constipated.  I  recommended they follow the primary doctor regarding this.  Plan for Medrol dose pack in effort to help with arthralgia.  She already has narcotic pain medicine prescribed, no change in this has been made.    FINAL IMPRESSION     1. Chronic right shoulder pain     2. Fecal urgency                     Electronically signed by: Tanvir Shi M.D., 1/19/2020 10:53 AM

## 2020-01-19 NOTE — ED NOTES
Assumed care of pt. Pt reports right shoulder pain with swelling, has required aspiration of fluid in past. Also reporting frequent urination. Shoulder has been x-rayed and no fx noted.

## 2020-07-09 RX ORDER — OLOPATADINE HYDROCHLORIDE 2 MG/ML
SOLUTION/ DROPS OPHTHALMIC
Qty: 3 ML | Refills: 3 | Status: SHIPPED
Start: 2020-07-09 | End: 2021-02-05

## 2021-01-11 DIAGNOSIS — Z23 NEED FOR VACCINATION: ICD-10-CM

## 2021-02-06 RX ORDER — OLOPATADINE HYDROCHLORIDE 2 MG/ML
SOLUTION/ DROPS OPHTHALMIC
Qty: 3 ML | Refills: 3 | Status: SHIPPED | OUTPATIENT
Start: 2021-02-06

## 2021-04-07 ENCOUNTER — PATIENT OUTREACH (OUTPATIENT)
Dept: HEALTH INFORMATION MANAGEMENT | Facility: OTHER | Age: 86
End: 2021-04-07

## 2021-11-29 NOTE — PROGRESS NOTES
Outcome:Declined [place] - pt is currenlty in [place]     Please transfer to Patient Outreach Team at [phone] when patient returns call.    Attempt # 2

## 2022-10-26 ENCOUNTER — TELEPHONE (OUTPATIENT)
Dept: HEALTH INFORMATION MANAGEMENT | Facility: OTHER | Age: 87
End: 2022-10-26
Payer: MEDICARE

## 2023-05-10 ENCOUNTER — TELEPHONE (OUTPATIENT)
Dept: HEALTH INFORMATION MANAGEMENT | Facility: OTHER | Age: 88
End: 2023-05-10
Payer: MEDICARE

## 2024-07-30 ENCOUNTER — TELEPHONE (OUTPATIENT)
Dept: HEALTH INFORMATION MANAGEMENT | Facility: OTHER | Age: 89
End: 2024-07-30

## 2024-11-20 ENCOUNTER — TELEPHONE (OUTPATIENT)
Dept: HEALTH INFORMATION MANAGEMENT | Facility: OTHER | Age: 89
End: 2024-11-20
Payer: MEDICARE

## 2025-07-17 ENCOUNTER — TELEPHONE (OUTPATIENT)
Dept: HEALTH INFORMATION MANAGEMENT | Facility: OTHER | Age: 89
End: 2025-07-17
Payer: MEDICARE